# Patient Record
Sex: FEMALE | Race: WHITE | Employment: OTHER | URBAN - METROPOLITAN AREA
[De-identification: names, ages, dates, MRNs, and addresses within clinical notes are randomized per-mention and may not be internally consistent; named-entity substitution may affect disease eponyms.]

---

## 2017-02-06 ENCOUNTER — OFFICE VISIT (OUTPATIENT)
Dept: SURGERY | Age: 62
End: 2017-02-06

## 2017-02-06 DIAGNOSIS — E66.01 OBESITY, MORBID, BMI 40.0-49.9 (HCC): Primary | ICD-10-CM

## 2017-02-07 VITALS — WEIGHT: 220 LBS | BODY MASS INDEX: 41.54 KG/M2 | HEIGHT: 61 IN

## 2017-02-07 NOTE — PROGRESS NOTES
Ravindra Mchugh participated in an educational session on the importance of starting to make healthy choices prior to weight loss surgery. General healthy foods were reviewed. Diet history was reviewed. Patient set a dietary, exercise, and behavioral goal in order to start making healthy changes now.      Visit Vitals    Ht 5' 1\" (1.549 m)    Wt 99.8 kg (220 lb)    BMI 41.57 kg/m2     Frank Fuentes RD

## 2017-03-15 ENCOUNTER — CLINICAL SUPPORT (OUTPATIENT)
Dept: SURGERY | Age: 62
End: 2017-03-15

## 2017-03-15 ENCOUNTER — OFFICE VISIT (OUTPATIENT)
Dept: SURGERY | Age: 62
End: 2017-03-15

## 2017-03-15 VITALS
WEIGHT: 207 LBS | DIASTOLIC BLOOD PRESSURE: 85 MMHG | HEART RATE: 108 BPM | BODY MASS INDEX: 39.08 KG/M2 | OXYGEN SATURATION: 99 % | SYSTOLIC BLOOD PRESSURE: 145 MMHG | HEIGHT: 61 IN

## 2017-03-15 VITALS — WEIGHT: 207 LBS | HEIGHT: 61 IN | BODY MASS INDEX: 39.08 KG/M2

## 2017-03-15 DIAGNOSIS — G47.30 SLEEP APNEA, UNSPECIFIED TYPE: ICD-10-CM

## 2017-03-15 DIAGNOSIS — Z79.01 ANTICOAGULATED: ICD-10-CM

## 2017-03-15 DIAGNOSIS — L30.9 ECZEMA, UNSPECIFIED TYPE: ICD-10-CM

## 2017-03-15 DIAGNOSIS — J45.20 MILD INTERMITTENT ASTHMA WITHOUT COMPLICATION: ICD-10-CM

## 2017-03-15 DIAGNOSIS — I10 ESSENTIAL HYPERTENSION: ICD-10-CM

## 2017-03-15 DIAGNOSIS — E78.00 HYPERCHOLESTEROLEMIA: ICD-10-CM

## 2017-03-15 DIAGNOSIS — G45.9 TRANSIENT CEREBRAL ISCHEMIA, UNSPECIFIED TYPE: ICD-10-CM

## 2017-03-15 DIAGNOSIS — E66.01 SEVERE OBESITY (BMI 35.0-39.9) WITH COMORBIDITY (HCC): Primary | ICD-10-CM

## 2017-03-15 DIAGNOSIS — E27.40 INSUFFICIENCY, ADRENAL (HCC): ICD-10-CM

## 2017-03-15 DIAGNOSIS — I73.9 PVD (PERIPHERAL VASCULAR DISEASE) (HCC): ICD-10-CM

## 2017-03-15 DIAGNOSIS — G89.29 CHRONIC BACK PAIN, UNSPECIFIED BACK LOCATION, UNSPECIFIED BACK PAIN LATERALITY: ICD-10-CM

## 2017-03-15 DIAGNOSIS — K21.9 GASTROESOPHAGEAL REFLUX DISEASE WITHOUT ESOPHAGITIS: ICD-10-CM

## 2017-03-15 DIAGNOSIS — E66.01 OBESITY, MORBID, BMI 40.0-49.9 (HCC): Primary | ICD-10-CM

## 2017-03-15 DIAGNOSIS — M54.9 CHRONIC BACK PAIN, UNSPECIFIED BACK LOCATION, UNSPECIFIED BACK PAIN LATERALITY: ICD-10-CM

## 2017-03-15 DIAGNOSIS — E66.01 MORBID OBESITY DUE TO EXCESS CALORIES (HCC): ICD-10-CM

## 2017-03-15 DIAGNOSIS — R73.03 PRE-DIABETES: ICD-10-CM

## 2017-03-15 DIAGNOSIS — I49.9 CARDIAC ARRHYTHMIA, UNSPECIFIED CARDIAC ARRHYTHMIA TYPE: ICD-10-CM

## 2017-03-15 RX ORDER — CHOLECALCIFEROL TAB 125 MCG (5000 UNIT) 125 MCG
TAB ORAL DAILY
COMMUNITY

## 2017-03-15 RX ORDER — ERGOCALCIFEROL 1.25 MG/1
50000 CAPSULE ORAL
COMMUNITY

## 2017-03-15 NOTE — PATIENT INSTRUCTIONS
Body Mass Index: Care Instructions  Your Care Instructions    Body mass index (BMI) can help you see if your weight is raising your risk for health problems. It uses a formula to compare how much you weigh with how tall you are. A BMI between 18.5 and 24.9 is considered healthy. A BMI between 25 and 29.9 is considered overweight. A BMI of 30 or higher is considered obese. If your BMI is in the normal range, it means that you have a lower risk for weight-related health problems. If your BMI is in the overweight or obese range, you may be at increased risk for weight-related health problems, such as high blood pressure, heart disease, stroke, arthritis or joint pain, and diabetes. BMI is just one measure of your risk for weight-related health problems. You may be at higher risk for health problems if you are not active, you eat an unhealthy diet, or you drink too much alcohol or use tobacco products. Follow-up care is a key part of your treatment and safety. Be sure to make and go to all appointments, and call your doctor if you are having problems. It's also a good idea to know your test results and keep a list of the medicines you take. How can you care for yourself at home? · Practice healthy eating habits. This includes eating plenty of fruits, vegetables, whole grains, lean protein, and low-fat dairy. · Get at least 30 minutes of exercise 5 days a week or more. Brisk walking is a good choice. You also may want to do other activities, such as running, swimming, cycling, or playing tennis or team sports. · Do not smoke. Smoking can increase your risk for health problems. If you need help quitting, talk to your doctor about stop-smoking programs and medicines. These can increase your chances of quitting for good. · Limit alcohol to 2 drinks a day for men and 1 drink a day for women. Too much alcohol can cause health problems.   If you have a BMI higher than 25  · Your doctor may do other tests to check your risk for weight-related health problems. This may include measuring the distance around your waist. A waist measurement of more than 40 inches in men or 35 inches in women can increase the risk of weight-related health problems. · Talk with your doctor about steps you can take to stay healthy or improve your health. You may need to make lifestyle changes to lose weight and stay healthy, such as changing your diet and getting regular exercise. Where can you learn more? Go to http://silvana-rasta.info/. Enter S176 in the search box to learn more about \"Body Mass Index: Care Instructions. \"  Current as of: February 16, 2016  Content Version: 11.1  © 4112-4165 [employer]. Care instructions adapted under license by [employer] (which disclaims liability or warranty for this information). If you have questions about a medical condition or this instruction, always ask your healthcare professional. [place] [address] any warranty or liability for your use of this information. Patient Instructions      1. Continue to monitor carbohydrate and protein intake- remember to keep your           total  carbohydrates to 50 grams or less per day for best results. 2. Remember hydration goals - usually 48 to 64 ounces of liquids per day  3. Continue to work towards exercise goals - minimum 3 days per week of 45          minutes to  1 hour at a time. 4. Remember to take vitamins as directed        Supplement Resource Guide    Importance of Protein:   Maintains lean body mass, produces antibodies to fight off infections, heals wounds, minimizes hair loss, helps to give you energy, helps with satiety, and keeping you full between meals.     Importance of Calcium:  Needed for healthy bones and teeth, normal blood clotting, and nervous system functioning, higher risk of osteoporosis and bone disease with non-compliance. Importance of Multivitamins: Many functions. Supply you with extra nutrients that you may be missing from food. May lead to iron deficiency anemia, weakness, fatigue, and many other symptoms with non-compliance. Importance of B Vitamins:  Important for red blood cell formation, metabolism, energy, and helps to maintain a healthy nervous system. Protein Supplement  Find one you like now. Use immediately after surgery. Look for:  35-50g protein each day from your protein supplement once you reach the progression diet. 0-3 g fat per serving  0-3 g sugar per serving    Protein drinks should be split in separate dosages. Recommend: Lifelong  1 year + Calcium Supplement:     Start taking within a month after surgery. Look for: Calcium Citrate Plus D (1500 mg per day)  Recommend: Citracal     .          Avoid chocolate chewable calcium. Can use chewable bariatric or [employer] brand or similar chewable. The body cannot absorb more than 500-600 mg @ a time. Take for Life Multi-vitamin Supplement:      1st Month After Surgery: Any complete chewable, such as: [place]s Complete chewables. Avoid [place] sours or gummies. They lack iron and other important nutrients and also have added sugar. Continue with chewable vitamin or change to adult complete multivitamin one month after surgery. Menstruating women can take a prenatal vitamin. Make sure has at least 18 mg iron and 328-926 mcg folic acid):    Vitamin B12, B Complex Vitamin, and Biotin  Start taking within a month after surgery. Vitamin B12:  1000 mcg of Vitamin B12 three times weekly    Must take sublingually (meaning you take it under your tongue) or in a liquid drop form for easy absorption. B Complex Vitamin: Take a pill or liquid drop form once daily. Biotin: This vitamin can help prevent hair loss.     Recommend 5mg   (5000 mcg) a day  Biotin is Optional

## 2017-03-15 NOTE — MR AVS SNAPSHOT
Visit Information Date & Time Provider Department Dept. Phone Encounter #  
 3/15/2017  9:00 AM [facility] Zunilda Hutchins [phone] [id number] Upcoming Health Maintenance Date Due Hepatitis C Screening 1955 DTaP/Tdap/Td series (1 - Tdap) 11/24/1975 PAP AKA CERVICAL CYTOLOGY 11/24/1975 BREAST CANCER SCRN MAMMOGRAM 11/24/2004 FOBT Q 1 YEAR AGE 50-75 11/24/2004 ZOSTER VACCINE AGE 60> 11/24/2014 INFLUENZA AGE 9 TO ADULT 8/1/2016 Allergies as of 3/15/2017  Review Complete On: 3/15/2017 By: Sincere Johnson MD  
  
 Severity Noted Reaction Type Reactions Contrast Agent [Iodine]  08/07/2012    Swelling, Cough Other Medication  08/07/2012    Other (comments) PAPER TAPE-BLISTERS Current Immunizations  Never Reviewed No immunizations on file. Not reviewed this visit Vitals Height(growth percentile) Weight(growth percentile) BMI OB Status Smoking Status 5' 1\" (1.549 m) 207 lb (93.9 kg) 39.11 kg/m2 Hysterectomy Never Smoker BMI and BSA Data Body Mass Index Body Surface Area  
 39.11 kg/m 2 2.01 m 2 Your Updated Medication List  
  
   
This list is accurate as of: 3/15/17  9:40 AM.  Always use your most recent med list.  
  
  
  
  
 AMBIEN 10 mg tablet Generic drug:  zolpidem Take 10 mg by mouth nightly as needed. DIOVAN 40 mg tablet Generic drug:  valsartan Take 40 mg by mouth daily. HYDROcodone-acetaminophen 5-325 mg per tablet Commonly known as:  Kiran Halley Take 1 Tab by mouth every four (4) hours as needed. * LYRICA 200 mg capsule Generic drug:  pregabalin Take 200 mg by mouth daily. * LYRICA 100 mg capsule Generic drug:  pregabalin Take 100 mg by mouth nightly. metoprolol tartrate 100 mg IR tablet Commonly known as:  LOPRESSOR Take 100 mg by mouth two (2) times a day. predniSONE 10 mg tablet Commonly known as:  Tyson Gelineau Take 5 mg by mouth two (2) times a day. PROTONIX 40 mg tablet Generic drug:  pantoprazole Take 40 mg by mouth daily. VALTREX 500 mg tablet Generic drug:  valACYclovir Take 500 mg by mouth daily. * Notice: This list has 2 medication(s) that are the same as other medications prescribed for you. Read the directions carefully, and ask your doctor or other care provider to review them with you. Patient Instructions Goals:  
1. Exercise on treadmill or chair or elliptical exercises, 3-4 per week for at least 15 min. Total among activities. 2. Continue new diet plan with two shakes as meals, up to two optional snacks, make sure to have non-starchy vegetables at dinner with protein source too. 3. Start taking multivitamin again- once daily. Introducing \[facility]\"" & HEALTH SERVICES! [employer] introduces [employer] patient portal. Now you can access parts of your medical record, email your doctor's office, and request medication refills online. 1. In your internet browser, go to [email or web address] 2. Click on the First Time User? Click Here link in the Sign In box. You will see the New Member Sign Up page. 3. Enter your [employer] Access Code exactly as it appears below. You will not need to use this code after youve completed the sign-up process. If you do not sign up before the expiration date, you must request a new code. · [employer] Access Code: [email or web address] Expires: 6/13/2017  8:57 AM 
 
4. Enter the last four digits of your Social Security Number (xxxx) and Date of Birth (mm/dd/yyyy) as indicated and click Submit. You will be taken to the next sign-up page. 5. Create a [employer] ID. This will be your [employer] login ID and cannot be changed, so think of one that is secure and easy to remember. 6. Create a [employer] password. You can change your password at any time. 7. Enter your Password Reset Question and Answer. This can be used at a later time if you forget your password. 8. Enter your e-mail address. You will receive e-mail notification when new information is available in [address] 9. Click Sign Up. You can now view and download portions of your medical record. 10. Click the Download Summary menu link to download a portable copy of your medical information. If you have questions, please visit the Frequently Asked Questions section of the [employer] website. Remember, [employer] is NOT to be used for urgent needs. For medical emergencies, dial 911. Now available from your iPhone and Android! Please provide this summary of care documentation to your next provider. Your primary care clinician is listed as [facility]. If you have any questions after today's visit, please call [phone].

## 2017-03-15 NOTE — MR AVS SNAPSHOT
Visit Information Date & Time Provider Department Dept. Phone Encounter #  
 3/15/2017  8:30 AM [facility] [place] [phone] [id number] Your Appointments 4/10/2017  6:00 PM  
EDUCATION CLASS with TSS NUTRI VISIT PEN [facility] ([facility]) Appt Note: f/u 3 of 4, [employer]- exercise log  
 [id number] [address]  
  
   
 [address] [place] [address] 5/17/2017 11:00 AM  
NUTRITION COUNSELING with TSS NUTRI VISIT PEN [facility] ([facility]) Appt Note: brown chart f/u, 4 of 4  
 [id number] [address]  
  
   
 [address] [place] [address] 5/17/2017 11:30 AM  
New Patient with MD [facility] ([facility]) Appt Note: brown chart f/u, 4 of 4  
 [id number] [address]  
  
   
 [address] [place] [address] Upcoming Health Maintenance Date Due Hepatitis C Screening 1955 DTaP/Tdap/Td series (1 - Tdap) 11/24/1975 PAP AKA CERVICAL CYTOLOGY 11/24/1975 BREAST CANCER SCRN MAMMOGRAM 11/24/2004 FOBT Q 1 YEAR AGE 50-75 11/24/2004 ZOSTER VACCINE AGE 60> 11/24/2014 INFLUENZA AGE 9 TO ADULT 8/1/2016 Allergies as of 3/15/2017  Review Complete On: 3/15/2017 By: David Davis MD  
  
 Severity Noted Reaction Type Reactions Contrast Agent [Iodine]  08/07/2012    Swelling, Cough Other Medication  08/07/2012    Other (comments) PAPER TAPE-BLISTERS Current Immunizations  Never Reviewed No immunizations on file. Not reviewed this visit You Were Diagnosed With   
  
 Codes Comments Severe obesity (BMI 35.0-39. 9) with comorbidity ([facility]    -  Primary ICD-10-CM: E66.01, C19.55 ICD-9-CM: 278.01, V85.35 Gastroesophageal reflux disease without esophagitis     ICD-10-CM: K21.9 ICD-9-CM: 530.81 Morbid obesity due to excess calories (HCC)     ICD-10-CM: E66.01 
ICD-9-CM: 278.01 Essential hypertension     ICD-10-CM: I10 
ICD-9-CM: 401.9 PVD (peripheral vascular disease) ([facility].)     ICD-10-CM: I73.9 ICD-9-CM: 443.9 Transient cerebral ischemia, unspecified type     ICD-10-CM: G45.9 ICD-9-CM: 435.9 Cardiac arrhythmia, unspecified cardiac arrhythmia type     ICD-10-CM: I49.9 ICD-9-CM: 427.9 Insufficiency, adrenal ([facility]     ICD-10-CM: E27.2 ICD-9-CM: 255.41 Eczema, unspecified type     ICD-10-CM: L30.9 ICD-9-CM: 692.9 Mild intermittent asthma without complication     FLR--TI: J45.20 ICD-9-CM: 493.90 Chronic back pain, unspecified back location, unspecified back pain laterality     ICD-10-CM: M54.9, G89.29 ICD-9-CM: 724.5, 338.29 Hypercholesterolemia     ICD-10-CM: E78.00 ICD-9-CM: 272.0 Sleep apnea, unspecified type     ICD-10-CM: G47.30 ICD-9-CM: 780.57 Pre-diabetes     ICD-10-CM: R73.03 
ICD-9-CM: 790.29 Anticoagulated     ICD-10-CM: Z79.01 
ICD-9-CM: V58.61 Vitals BP Pulse Height(growth percentile) Weight(growth percentile) SpO2 BMI  
 145/85 (BP 1 Location: Left arm, BP Patient Position: Sitting) (!) 108 5' 1\" (1.549 m) 207 lb (93.9 kg) 99% 39.11 kg/m2 OB Status Smoking Status Hysterectomy Never Smoker BMI and BSA Data Body Mass Index Body Surface Area  
 39.11 kg/m 2 2.01 m 2 Your Updated Medication List  
  
   
This list is accurate as of: 3/15/17  9:56 AM.  Always use your most recent med list.  
  
  
  
  
 AMBIEN 10 mg tablet Generic drug:  zolpidem Take 10 mg by mouth nightly as needed. DIOVAN 40 mg tablet Generic drug:  valsartan Take 40 mg by mouth daily. HYDROcodone-acetaminophen 5-325 mg per tablet Commonly known as:  Juanetta Rasp Take 1 Tab by mouth every four (4) hours as needed. * LYRICA 200 mg capsule Generic drug:  pregabalin Take 200 mg by mouth daily. * LYRICA 100 mg capsule Generic drug:  pregabalin Take 100 mg by mouth nightly. metoprolol tartrate 100 mg IR tablet Commonly known as:  LOPRESSOR Take 100 mg by mouth two (2) times a day. predniSONE 10 mg tablet Commonly known as:  Brandee Hawks Take 5 mg by mouth two (2) times a day. PROTONIX 40 mg tablet Generic drug:  pantoprazole Take 40 mg by mouth daily. VALTREX 500 mg tablet Generic drug:  valACYclovir Take 500 mg by mouth daily. * Notice: This list has 2 medication(s) that are the same as other medications prescribed for you. Read the directions carefully, and ask your doctor or other care provider to review them with you. To-Do List   
 03/15/2017 Lab:  CBC WITH AUTOMATED DIFF   
  
 03/15/2017 Lab:  H. PYLORI ABS, IGG, IGA, IGM   
  
 03/15/2017 Lab:  TSH 3RD GENERATION Patient Instructions Body Mass Index: Care Instructions Your Care Instructions Body mass index (BMI) can help you see if your weight is raising your risk for health problems. It uses a formula to compare how much you weigh with how tall you are. A BMI between 18.5 and 24.9 is considered healthy. A BMI between 25 and 29.9 is considered overweight. A BMI of 30 or higher is considered obese. If your BMI is in the normal range, it means that you have a lower risk for weight-related health problems. If your BMI is in the overweight or obese range, you may be at increased risk for weight-related health problems, such as high blood pressure, heart disease, stroke, arthritis or joint pain, and diabetes. BMI is just one measure of your risk for weight-related health problems. You may be at higher risk for health problems if you are not active, you eat an unhealthy diet, or you drink too much alcohol or use tobacco products. Follow-up care is a key part of your treatment and safety. Be sure to make and go to all appointments, and call your doctor if you are having problems. It's also a good idea to know your test results and keep a list of the medicines you take. How can you care for yourself at home? · Practice healthy eating habits. This includes eating plenty of fruits, vegetables, whole grains, lean protein, and low-fat dairy. · Get at least 30 minutes of exercise 5 days a week or more. Brisk walking is a good choice. You also may want to do other activities, such as running, swimming, cycling, or playing tennis or team sports. · Do not smoke. Smoking can increase your risk for health problems. If you need help quitting, talk to your doctor about stop-smoking programs and medicines. These can increase your chances of quitting for good. · Limit alcohol to 2 drinks a day for men and 1 drink a day for women. Too much alcohol can cause health problems. If you have a BMI higher than 25 · Your doctor may do other tests to check your risk for weight-related health problems. This may include measuring the distance around your waist. A waist measurement of more than 40 inches in men or 35 inches in women can increase the risk of weight-related health problems. · Talk with your doctor about steps you can take to stay healthy or improve your health. You may need to make lifestyle changes to lose weight and stay healthy, such as changing your diet and getting regular exercise. Where can you learn more? Go to http://silvana-rasta.info/. Enter S176 in the search box to learn more about \"Body Mass Index: Care Instructions. \" Current as of: February 16, 2016 Content Version: 11.1 © 3014-7392 [employer].  Care instructions adapted under license by [address] (which disclaims liability or warranty for this information). If you have questions about a medical condition or this instruction, always ask your healthcare professional. [address] any warranty or liability for your use of this information. Introducing [facility] & HEALTH SERVICES! Romel Aaron introduces [employer] patient portal. Now you can access parts of your medical record, email your doctor's office, and request medication refills online. 1. In your internet browser, go to [email or web address] 2. Click on the First Time User? Click Here link in the Sign In box. You will see the New Member Sign Up page. 3. Enter your [employer] Access Code exactly as it appears below. You will not need to use this code after youve completed the sign-up process. If you do not sign up before the expiration date, you must request a new code. · [employer] Access Code: [email or web address] Expires: 6/13/2017  8:57 AM 
 
4. Enter the last four digits of your Social Security Number (xxxx) and Date of Birth (mm/dd/yyyy) as indicated and click Submit. You will be taken to the next sign-up page. 5. Create a [employer] ID. This will be your [employer] login ID and cannot be changed, so think of one that is secure and easy to remember. 6. Create a [employer] password. You can change your password at any time. 7. Enter your Password Reset Question and Answer. This can be used at a later time if you forget your password. 8. Enter your e-mail address. You will receive e-mail notification when new information is available in [address] 9. Click Sign Up. You can now view and download portions of your medical record. 10. Click the Download Summary menu link to download a portable copy of your medical information. If you have questions, please visit the Frequently Asked Questions section of the [employer] website.  Remember, [employer] is NOT to be used for urgent needs. For medical emergencies, dial 911. Now available from your iPhone and Android! Please provide this summary of care documentation to your next provider. Your primary care clinician is listed as Melissa Carrera. If you have any questions after today's visit, please call [phone].

## 2017-03-15 NOTE — PROGRESS NOTES
Medical Weight Loss Multi-Disciplinary Program    Name: Amy Belle   : 1955    Session# 2  Date: 3/15/2017     Height: 5' 1\" (154.9 cm)    Weight: 93.9 kg (207 lb) lbs. Body mass index is 39.11 kg/(m^2). Pounds Lost: 13    Dietary Instructions    Reviewed intake  Understanding low carbohydrates, low sugar, higher protein meals  Understanding proper portions  Instruction given for personal dietary changes  Discussed perceived compliance  Comments: Pt given brief pre/post-op diet ed and diet hx reviewed. Physical Activity/Exercise    Discussed Perceived Compliance  Reasonable Goals Set  Motivation  Comments: Pt walks on her treadmill for about 15 minutes, 3-4 times per week on her good days when she does not have very much back pain. Goal to be consistent with exercise (either treadmill, elliptical, or chair exercises when has back pain), doing them for a total of at least 15 minutes, 3-4 times per week. Behavior Modification    Positive attitude  Comments: Pt is working on the following goals:    1. Exercise on treadmill or chair or elliptical exercises, 3-4 per week for at least 15 min. Total among activities. 2. Continue new diet plan with two shakes as meals, up to two optional snacks, make sure to have non-starchy vegetables at dinner with protein source too. 3. Start taking multivitamin again- once daily. Candidate for surgery (per RD): pending     Dietitian: Rita Warner RD     Amy Belle is a 57 y.o. female who present for a pre-op evaluation.     Visit Vitals    Ht 5' 1\" (1.549 m)    Wt 93.9 kg (207 lb)    BMI 39.11 kg/m2     Past Medical History:   Diagnosis Date    Anticoagulated     due to PVD    Arrhythmia     sees Dr. Jaye Drummond - uses Beta Blockers for rate control    Asthma     Chronic back pain     Eczema     GERD (gastroesophageal reflux disease)     Hypercholesterolemia     Hypertension     Insufficiency, adrenal ([address]     pt states due to multiple steroid injection for back pain and eczema    Pre-diabetes     last HgbA1C (early 2017) was under 7.0    PVD (peripheral vascular disease) (HCC)     hx of carotid disease    Severe obesity (BMI 35.0-39. 9) with comorbidity ([address]     Severe obesity ([address]     Sleep apnea     does not use a device    TIA (transient ischemic attack)     X 2 related to carotid disease       Patient Education and Materials Provided:  Supplement [facility], B Vitamin Information, MVI Recommendations, Calcium Citrate Information, Bariatric Supplement Companies, Protein Supplement Information, Fluid Requirements, No Caffeine or Carbonation, No Alcohol for One Year Post Op, 3 Balanced Meals a Day, Food Group Guide, Exercising and Addressed Current Habits / Changes to make     Reasons for Surgery:  BMI > 35 with one or more medically significant comorbidities    Summary:  Pt given brief pre/post-op diet ed and diet hx reviewed. Pt set several goals. See below. Pt has already made significant changes to include decreased carbohydrate (using Premier for two meals per day), decreased caffeine, decreased diet soda, and decreased calories overall. Candidate for surgery:   Pending    Procedure:  laparoscopic sleeve gastrectomy    Nutritional Hx: What is the number of meals you eat per day? 3  Comment: dinner, protein drink- Premier for two meals- just started this after the seminar     Do you eat between meals / snack? yes  Typical snack: fruit cocktail, applesauce, or cucumber- typically once per day     Eating speed: slow- been working on slowing down    How many sodas do you drink per day? 1/2 of a [employer] daily, down from 3     How many caffeinated drinks do you have per day? Cut coffee out    How much water do you drink per day? 4 (8oz glasses)    How often do you eat fast food? never, sit down restaurant once per week    How often do you consume alcohol? never      Diet History:  Breakfast  What are you eating and how much? Premier shake   When? 6:30 am    Where? iii   Snacks  What are you eating and how much? i   When? ii   Where? iii   Hydration  What are you eating and how much? Water, decaf coffee with a tsp of sugar    When? ii   Where? ii   Lunch  What are you eating and how much? Premier shake    When? 12:30    Where? iii   Snacks  What are you eating and how much? Small fruit cocktail in natural juice    When? 3 pm    Where? iii   Hydration  What are you eating and how much? Cup of decaf coffee with 1 tsp sugar   When? ii   Where? iii   Dinner  What are you eating and how much? Sloppy joes with lean hamburger on a bun   When? 4 pm    Where? iii   Snacks  What are you eating and how much? 3 rd cup of decaf coffee with 1 tsp of sugar    When? 6 pm   Where? iii   Hydration  What are you eating and how much? water   When? 7 pm and after- have no calories    Where? iii     Exercise:  Do you currently have an exercise routine? yes  What kind of exercise do you do? uses her treadmill  . For how long? 15 min For how often? 3-4 times per week    Goals:   1. Exercise on treadmill or chair or elliptical exercises, 3-4 per week for at least 15 min. Total among activities. 2. Continue new diet plan with two shakes as meals, up to two optional snacks, make sure to have non-starchy vegetables at dinner with protein source too. 3. Start taking multivitamin again- once daily.

## 2017-03-16 NOTE — PROGRESS NOTES
Bariatric Surgery Consultation    Subjective: The patient is a 57 y.o. obese female with a Body mass index is 39.11 kg/(m^2). Ethbrooklyne Silverio The patient is currently her heaviest weight for the past 2 years. she has been overweight since early adulthood. she has been considering surgery since last year. she desires surgery at this time because of   multiple health concerns and their lifestyle issues which are hindered by their weight. she has been referred by his family physician Dr Jade Pritchett for   evaluation and treatment of their obesity via surgical intervention. Tin Powell has tried multiple diets in her lifetime most recently tried behavior modification and unsupervised diets    Bariatric comorbidities present are   Patient Active Problem List   Diagnosis Code    Severe obesity ([facility] E66.01    Severe obesity (BMI 35.0-39. 9) with comorbidity ([facility] E66.01, Z68.35    Hypertension I10    Asthma J45.909    Chronic back pain M54.9, G89.29    Hypercholesterolemia E78.00    Sleep apnea G47.30    PVD (peripheral vascular disease) (HCC) I73.9    Insufficiency, adrenal (HCC) E27.2    Eczema L30.9    TIA (transient ischemic attack) G45.9    GERD (gastroesophageal reflux disease) K21.9    Pre-diabetes R73.03    Anticoagulated Z79.01    Arrhythmia I49.9       The patient is considering laparoscopic sleeve gastrectomy for surgical weight loss due to their ineffective progress with medical forms of weight loss and the urging of their   physician who cares for their primary medical issues. The patient  now presents  for consideration for weight loss surgery understanding the benefits of this over a medical   approach of weight loss as was discussed in our presentation on weight loss surgery. They have discussed their plans both with their family and primary care physician who   is in support of their pursuit of such.  The patient has had no health issues as of late and denies and gastrointestinal disturbances other than what is outlined below in their   review of symptoms. All of their prior evaluations available by both their PCP's and specialists physicians have been reviewed today either in the Care Everywhere portal or   scanned under the media tab. I have spent a large portion of my initial consultation today reviewing the patients current dietary habits which have contributed to their health issues and obesity. I have suggested to them personally a dietary regimen that they can initiate now to help with their status as it pertains to their weight. They understand that the most important   aspect of their journey through their weight loss endeavor will be their adherence to a new lifestyle of healthy eating behavior. They also understand that an adherence to an   exercise program will not only help with weight loss but is ultimately important in weight maintenance. The patients goal weight is 130lb. These goals are consistent with expected outcomes of their desired operation. her Medical goals are resolution of these health issues. Patient Active Problem List    Diagnosis Date Noted    Severe obesity ([address]     Severe obesity (BMI 35.0-39. 9) with comorbidity ([facility]     Hypertension     Asthma     Chronic back pain     Hypercholesterolemia     Sleep apnea     PVD (peripheral vascular disease) (HCC)     Insufficiency, adrenal (HCC)     Eczema     TIA (transient ischemic attack)     GERD (gastroesophageal reflux disease)     Pre-diabetes     Anticoagulated     Arrhythmia      Past Surgical History:   Procedure Laterality Date    FUSION OF SACROILIAC JOINT      HX BLADDER SUSPENSION      HX CAROTID ENDARTERECTOMY  07/2012    left    HX CERVICAL FUSION      HX HEMORRHOIDECTOMY      HX LAP CHOLECYSTECTOMY      HX LUMBAR FUSION      X 2    HX ORTHOPAEDIC      hammer toe    HX ORTHOPAEDIC      tendon repair foot    HX TOTAL ABDOMINAL HYSTERECTOMY        Social History   Substance Use Topics    Smoking status: Never Smoker    Smokeless tobacco: Not on file    Alcohol use No      Family History   Problem Relation Age of Onset    Cancer Mother     Cancer Father       Current Outpatient Prescriptions   Medication Sig Dispense Refill    metoprolol (LOPRESSOR) 100 mg tablet Take 100 mg by mouth two (2) times a day.  pregabalin (LYRICA) 200 mg capsule Take 200 mg by mouth daily.  pregabalin (LYRICA) 100 mg capsule Take 100 mg by mouth nightly.  pantoprazole (PROTONIX) 40 mg tablet Take 40 mg by mouth daily.  zolpidem (AMBIEN) 10 mg tablet Take 10 mg by mouth nightly as needed.  predniSONE (DELTASONE) 10 mg tablet Take 5 mg by mouth two (2) times a day.  valACYclovir (VALTREX) 500 mg tablet Take 500 mg by mouth daily.  valsartan (DIOVAN) 40 mg tablet Take 40 mg by mouth daily.  HYDROcodone-acetaminophen (NORCO) 5-325 mg per tablet Take 1 Tab by mouth every four (4) hours as needed.  cholecalciferol, VITAMIN D3, (VITAMIN D3) 5,000 unit tab tablet Take  by mouth daily.  ergocalciferol (VITAMIN D2) 50,000 unit capsule Take 50,000 Units by mouth.  CYANOCOBALAMIN, VITAMIN B-12, (B-12 DOTS PO) Take 1,000 mcg by mouth.        Allergies   Allergen Reactions    Contrast Agent [Iodine] Swelling and Cough    Other Medication Other (comments)     PAPER TAPE-BLISTERS          Review of Systems:     General - No history or complaints of unexpected fever, chills, or weight loss  Head/Neck - No history or complaints of headache, diplopia, dysphagia, hearing loss  Cardiac - No history or complaints of chest pain, palpitations, murmur, or shortness of breath  Pulmonary - No history or complaints of shortness of breath, productive cough, hemoptysis  Gastrointestinal - (+) reflux, no abdominal pain, obstipation/constipation or blood per rectum  Genitourinary - No history or complaints of hematuria/dysuria, stress urinary incontinence symptoms, or renal lithiasis  Musculoskeletal - moderate joint pain in their back and knees,  no muscular weakness  Hematologic - No history or complaints of bleeding disorders,  No blood transfusions  Neurologic - No history or complaints of  migraine headaches, seizure activity, syncopal episodes, TIA or stroke  Integumentary - No history or complaints of rashes, abnormal nevi, skin cancer  Gynecological - post hysterectomy    Objective:     Visit Vitals    /85 (BP 1 Location: Left arm, BP Patient Position: Sitting)    Pulse (!) 108    Ht 5' 1\" (1.549 m)    Wt 93.9 kg (207 lb)    SpO2 99%    BMI 39.11 kg/m2       Physical Examination: General appearance - oriented to person, place, and time and chronically ill appearing  Mental status - alert, oriented to person, place, and time, normal mood, behavior, speech, dress, motor activity, and thought processes  Eyes - pupils equal and reactive, extraocular eye movements intact, sclera anicteric, left eye normal, right eye normal  Ears - right ear normal, left ear normal  Nose - normal and patent, no erythema, discharge or polyps  Mouth - mucous membranes moist, pharynx normal without lesions  Neck - supple, no significant adenopathy, left sided cervical incision noted  Lymphatics - no palpable lymphadenopathy, no hepatosplenomegaly  Chest - clear to auscultation, no wheezes, rales or rhonchi, symmetric air entry  Heart - normal rate, regular rhythm, normal S1, S2, no murmurs, rubs, clicks or gallops  Abdomen - soft, nontender, nondistended, no masses or organomegaly  Back exam - full range of motion, no tenderness, palpable spasm or pain on motion  Neurological - alert, oriented, normal speech, no focal findings or movement disorder noted  Musculoskeletal - no joint tenderness, deformity or swelling  Extremities - peripheral pulses normal, no pedal edema, no clubbing or cyanosis  Skin - normal coloration and turgor, no rashes, no suspicious skin lesions noted    Labs: No results found for this or any previous visit (from the past 1440 hour(s)). Assessment:     Morbid obesity with comorbidity    Plan:     laparoscopic sleeve gastrectomy    This is a 57 y.o. female with a BMI of Body mass index is 39.11 kg/(m^2). and the weight-related co-morbidties as noted below. Raissa Mcdaniel meets the NIH criteria for bariatric surgery   based upon the BMI of Body mass index is 39.11 kg/(m^2). and multiple weight-related co-morbidties. Raissa Mcdaniel has elected laparoscopic sleeve gastrectomy as her intervention of   choice for treatment of morbid obestiy through surgical means secondary to its safety profile, rapid return to work  and decreases in operative risks over gastric bypass. In the office today, following Cece's history and physical examination, a 30 minute discussion regarding the anatomic alterations for the laparoscopic sleeve gastrectomy was   undertaken. The dietary expectations and the patient and physician dependent factors for success were thoroughly discussed, to include the need for interval follow-up and long  -term dietary changes associated with success. The possible complications of the sleeve gastrectomy  were also discussed, to include;death, DVT/PE, staple line leak, bleeding,   stricture formation, infection, nutritional deficiencies and sleeve dilation. Specific weight related outcomes for success were also discussed with an emphasis on careful and close   follow-up with the first year and eating behavior modification as the baseline and cyclical hunger return. The patient expressed an understanding of the above factors, and her questions were answered in their entirety. In addition, the patient attended a 1.5 hour power point seminar regarding obesity, surgical weight loss including, adjustable gastric band, gastric bypass, and sleeve gastrectomy.     This discussion contrasted the different surgical techniques, mechanisms of actions and expected outcomes, and surgical and medical risks associated with each procedure. During this seminar, there was a long question and answer session where each questions was answered until there were no additional questions. Today, the patient had all of her questions answered and desires to proceed with  bariatric surgery initially choosing sleeve gastrectomy as her surgical option. The patient's adrenal issues and her need for steroids could be problematic. I have discussed with her the effects of steroids post-op and how they affect healing. She understands the need to be completely off any form of steroid for a 6 week perioperative period. She will discuss this with her endocrinologist to see if this is feasible. She understands that there is a good likelihood that we may not be able to proceed with bariatric surgery. If she does proceed with surgery she will need a complete cardiac work-up. Secondary Diagnoses:     Dietary Intervention  - The patient is currently scheduled to see or has been followed by a bariatric nutritionist for an attempt at preoperative weight loss as has been dictated by their insurance carrier. They will be assessed at various times during their follow up to evaluate their progress depending on the length of time that is required once again by their carrier. I have explained the importance of   preoperative weight loss and the benefits regarding lower surgical risk and also assisting the patient in reaching their weight loss goal.  Finally they understand their is a physiologic benefit from the   standpoint of hepatic volume reduction preoperatively. I have reiterated the importance of a low carbohydrate and high protein regimen to achieve their stated goal.    Obstructive Sleep Apnea -The patient understands the association of sleep apnea and obesity and the additional risk that it caries related to post surgical complications. We will have the patient   bring their CPAP machine to the hospital for use both postoperatively in the PACU and on the floor at its appropriate setting.  We will have them continue using it while at home after surgery and   follow up with their pulmonologist 6 months after to be retested to see if it can be discontinued at that time period. Adult Onset Diabetes - The patient has jenny given a very low carbohydrate diet preoperatively along with instructions to monitor their blood sugars on a regular daily basis. When  their surgery is   performed  we will be monitoring the patient with sliding scale insulin and accuchecks.  Based on those values we will determine whether the patient needs a reduction of those medications   postoperatively or total removal of those medications on discharge.  We will have the patient continue accuchecks postoperatively while at home also and report to me or their family physician   for appropriate adjustments as needed.  The patient also understands that in the event of uncontrolled blood sugar preoperatively that we may choose to postpone their surgery. Hypertension - The patient has a clear understanding of how weight loss improves hypertension as a whole, but also they understand that there is a significant genetic component to this disease   process. We will monitor the patients blood pressure while in the hospital and the plan would be to continue those medications postoperatively.  If a diuretic is being used we will stop them on discharge   to prevent dehydration particularly with the sleeve gastrectomy and the gastric bypass procedures.  They will be instructed to monitor their blood pressure postoperatively while at home and notify their   primary care physician in the event of any significantly high or uncharacteristic readings. Hyperlipidemia - The patient understands that studies show that almost all patient will realize an improvement in their lipid profile with weight loss that occurs with these procedures.  They however also   understand that hyperlipidemia is a multifactorial disease particularly as it pertains to their genetic background and that there is no guarantee toward cure  of this issue. We will resume their medications   immediately postoperatively as this tends to decrease any post operative cardiac events.  The patient will follow up with their family physician in the postoperative period with plans to repeat their lipid panel   2-3 month postoperative for potential adjustment or removal of these medications. GERD -The patient understands that weight loss surgery is not a guaranteed cure for reflux disease but does understand the benefits that weight loss can have on reflux disease.  They also understand that   at the time of surgery the gastroesophageal junction will be evaluated for the presence of a diaphragmatic hernia.  Hernias will be corrected always with the gastric band and sleeve gastrectomy procedures,   but only on a case by case basis with the gastric bypass if it prevents our ability to perform the operation at hand, or if I feel that they would benefit long term with correction of this issue.  The patient also   understands that neither weight loss surgery nor repair of a diaphragmatic hernia repair guarantees the complete cessation of the disease. They also understand there is a possibility of recurrence with a   simple crural repair as is performed with these procedures. They understand they may have to continue their medications in the postoperative period. They have a good understanding that the gastric bypass   procedure is better suited to total resolution of this issue and that neither the Lap Band nor sleeve gastrectomy is considered a curative procedure as it pertains to this diagnosis.     Weight Related Arthritis -The patient understands the benefits that weight loss surgery can have on their arthritis but also understands that weight loss is not a guaranteed cure and relief of symptoms is   often dependent on the severity of the underlying disease.  The patient also understands that traditional pharmaceutical treatments for this diagnosis are usually unavailable to post-operative weight loss   patients due to the effects on the gastrointestinal tract particularly with the gastric bypass and to a lesser effect with the sleeve gastrectomy.  Any changes to the patients medication treatment will ultimately   be made the patients PCP with input by our office. Hyperlipidemia - The patient understands that studies show that almost all patient will realize an improvement in their lipid profile with weight loss that occurs with these procedures. They however   also understand that hyperlipidemia is a multifactorial disease particularly as it pertains to their genetic background and that there is no guarantee toward cure  of this issue. We will resume their medications   both pre operatively and immediately postoperatively as this tends to decrease any post operative cardiac events.  The patient will follow up with their family physician in the postoperative period with plans to   repeat their lipid panel 2-3 month postoperative for potential adjustment or removal of these medications. Weight Related Arthritis -The patient understands the benefits that weight loss surgery can have on their arthritis but also understands that weight loss is not a guaranteed cure and relief of symptoms is   often dependent on the severity of the underlying disease.  The patient also understands that traditional pharmaceutical treatments for this diagnosis are usually unavailable to post-operative weight loss   patients due to the effects on the gastrointestinal tract particularly with the gastric bypass and to a lesser effect with the sleeve gastrectomy.  Any changes to the patients medication treatment will   ultimately be made the patients PCP with input by our office.     Coronary Artery Disease - The patient has undergone or will undergo a preoperative evaluation by a cardiologist such that they are deemed a reasonable candidate for surgery. The patient understands   that with a history of cardiac disease that there is always an increased risk compared to the average patient. Appropriate recommendations have been followed as recommended by the cardiologist.  The   patients ASA will be resumed approximately 1 month postoperatively in a coated form. If her endocrinologist feels she can remain off of prednisone or other steroids in the chris op period we will have her see a   Cardiologist pre op.         Signed By: Ashli Aggarwal MD     March 16, 2017

## 2017-04-10 ENCOUNTER — OFFICE VISIT (OUTPATIENT)
Dept: SURGERY | Age: 62
End: 2017-04-10

## 2017-04-10 DIAGNOSIS — E66.01 SEVERE OBESITY (BMI 35.0-39.9) WITH COMORBIDITY (HCC): Primary | ICD-10-CM

## 2017-04-26 VITALS — HEIGHT: 61 IN | BODY MASS INDEX: 40.02 KG/M2 | WEIGHT: 212 LBS

## 2017-04-26 NOTE — PROGRESS NOTES
Medical Weight Loss Multi-Disciplinary Program    Name: Niranjan Motta   : 1955    Session# 3  Date: 4/10/2017     Height: 5' 1\" (154.9 cm)    Weight: 96.2 kg (212 lb) lbs. Body mass index is 40.06 kg/(m^2). Pounds Gained: 5    Dietary Instructions    Reviewed intake  Understanding low carbohydrates, low sugar, higher protein meals  Understanding proper portions  Instruction given for personal dietary changes  Discussed perceived compliance  Comments: Diet hx reviewed and personal dietary changes discussed. Pt received a Thoughtful Eating diet ed. Physical Activity/Exercise    Reviewed Activity Log  Discussed Perceived Compliance  Reasonable Goals Set  Motivation  Comments: Pt has done well using the treadmill for 15 minutes, 4 times per week. She plans to increase. Behavior Modification    Achieving/Rewarding goals met  Positive attitude  Discussed perceived compliance  Comments: Pt is doing well exercising and plans to increase. She has decreased caffeine this month. She is working to decrease portions at dinner, increase water, and get back to her previous routine.      Candidate for surgery (per RD): pending    Dietitian: Josiane Jimenez RD

## 2018-08-31 ENCOUNTER — APPOINTMENT (OUTPATIENT)
Dept: CT IMAGING | Age: 63
End: 2018-08-31
Attending: PHYSICIAN ASSISTANT
Payer: MEDICARE

## 2018-08-31 ENCOUNTER — HOSPITAL ENCOUNTER (EMERGENCY)
Age: 63
Discharge: HOME | End: 2018-09-01
Attending: EMERGENCY MEDICINE
Payer: MEDICARE

## 2018-08-31 VITALS
HEIGHT: 61 IN | HEART RATE: 75 BPM | DIASTOLIC BLOOD PRESSURE: 69 MMHG | BODY MASS INDEX: 40.59 KG/M2 | RESPIRATION RATE: 15 BRPM | WEIGHT: 215 LBS | SYSTOLIC BLOOD PRESSURE: 138 MMHG | OXYGEN SATURATION: 97 % | TEMPERATURE: 99.9 F

## 2018-08-31 DIAGNOSIS — K52.9 COLITIS: Primary | ICD-10-CM

## 2018-08-31 LAB
ALBUMIN SERPL-MCNC: 3.4 G/DL (ref 3.4–5)
ALBUMIN/GLOB SERPL: 0.9 {RATIO} (ref 0.8–1.7)
ALP SERPL-CCNC: 100 U/L (ref 45–117)
ALT SERPL-CCNC: 104 U/L (ref 13–56)
ANION GAP SERPL CALC-SCNC: 11 MMOL/L (ref 3–18)
APPEARANCE UR: CLEAR
AST SERPL-CCNC: 101 U/L (ref 15–37)
BACTERIA URNS QL MICRO: ABNORMAL /HPF
BASOPHILS # BLD: 0 K/UL (ref 0–0.1)
BASOPHILS NFR BLD: 0 % (ref 0–2)
BILIRUB SERPL-MCNC: 0.5 MG/DL (ref 0.2–1)
BILIRUB UR QL: NEGATIVE
BUN SERPL-MCNC: 11 MG/DL (ref 7–18)
BUN/CREAT SERPL: 10 (ref 12–20)
CALCIUM SERPL-MCNC: 8.5 MG/DL (ref 8.5–10.1)
CHLORIDE SERPL-SCNC: 99 MMOL/L (ref 100–108)
CO2 SERPL-SCNC: 26 MMOL/L (ref 21–32)
COLOR UR: YELLOW
CREAT SERPL-MCNC: 1.06 MG/DL (ref 0.6–1.3)
DIFFERENTIAL METHOD BLD: ABNORMAL
EOSINOPHIL # BLD: 0.2 K/UL (ref 0–0.4)
EOSINOPHIL NFR BLD: 1 % (ref 0–5)
EPITH CASTS URNS QL MICRO: ABNORMAL /LPF (ref 0–5)
ERYTHROCYTE [DISTWIDTH] IN BLOOD BY AUTOMATED COUNT: 13.3 % (ref 11.6–14.5)
GLOBULIN SER CALC-MCNC: 3.8 G/DL (ref 2–4)
GLUCOSE SERPL-MCNC: 173 MG/DL (ref 74–99)
GLUCOSE UR STRIP.AUTO-MCNC: NEGATIVE MG/DL
HCT VFR BLD AUTO: 40 % (ref 35–45)
HGB BLD-MCNC: 13.4 G/DL (ref 12–16)
HGB UR QL STRIP: NEGATIVE
KETONES UR QL STRIP.AUTO: NEGATIVE MG/DL
LEUKOCYTE ESTERASE UR QL STRIP.AUTO: ABNORMAL
LIPASE SERPL-CCNC: 112 U/L (ref 73–393)
LYMPHOCYTES # BLD: 1.4 K/UL (ref 0.9–3.6)
LYMPHOCYTES NFR BLD: 10 % (ref 21–52)
MCH RBC QN AUTO: 32.1 PG (ref 24–34)
MCHC RBC AUTO-ENTMCNC: 33.5 G/DL (ref 31–37)
MCV RBC AUTO: 95.7 FL (ref 74–97)
MONOCYTES # BLD: 0.7 K/UL (ref 0.05–1.2)
MONOCYTES NFR BLD: 5 % (ref 3–10)
NEUTS SEG # BLD: 11.4 K/UL (ref 1.8–8)
NEUTS SEG NFR BLD: 84 % (ref 40–73)
NITRITE UR QL STRIP.AUTO: NEGATIVE
PH UR STRIP: 7.5 [PH] (ref 5–8)
PLATELET # BLD AUTO: 283 K/UL (ref 135–420)
PMV BLD AUTO: 10.1 FL (ref 9.2–11.8)
POTASSIUM SERPL-SCNC: 4.2 MMOL/L (ref 3.5–5.5)
PROT SERPL-MCNC: 7.2 G/DL (ref 6.4–8.2)
PROT UR STRIP-MCNC: NEGATIVE MG/DL
RBC # BLD AUTO: 4.18 M/UL (ref 4.2–5.3)
RBC #/AREA URNS HPF: NEGATIVE /HPF (ref 0–5)
SODIUM SERPL-SCNC: 136 MMOL/L (ref 136–145)
SP GR UR REFRACTOMETRY: 1.02 (ref 1–1.03)
UROBILINOGEN UR QL STRIP.AUTO: 1 EU/DL (ref 0.2–1)
WBC # BLD AUTO: 13.6 K/UL (ref 4.6–13.2)
WBC URNS QL MICRO: ABNORMAL /HPF (ref 0–5)

## 2018-08-31 PROCEDURE — 74011250636 HC RX REV CODE- 250/636: Performed by: PHYSICIAN ASSISTANT

## 2018-08-31 PROCEDURE — 81001 URINALYSIS AUTO W/SCOPE: CPT | Performed by: EMERGENCY MEDICINE

## 2018-08-31 PROCEDURE — 99283 EMERGENCY DEPT VISIT LOW MDM: CPT

## 2018-08-31 PROCEDURE — 74011250637 HC RX REV CODE- 250/637: Performed by: PHYSICIAN ASSISTANT

## 2018-08-31 PROCEDURE — 85025 COMPLETE CBC W/AUTO DIFF WBC: CPT | Performed by: EMERGENCY MEDICINE

## 2018-08-31 PROCEDURE — 83690 ASSAY OF LIPASE: CPT | Performed by: EMERGENCY MEDICINE

## 2018-08-31 PROCEDURE — 80053 COMPREHEN METABOLIC PANEL: CPT | Performed by: EMERGENCY MEDICINE

## 2018-08-31 PROCEDURE — 96361 HYDRATE IV INFUSION ADD-ON: CPT

## 2018-08-31 PROCEDURE — 74176 CT ABD & PELVIS W/O CONTRAST: CPT

## 2018-08-31 RX ORDER — OXYCODONE AND ACETAMINOPHEN 5; 325 MG/1; MG/1
1 TABLET ORAL
Status: COMPLETED | OUTPATIENT
Start: 2018-08-31 | End: 2018-08-31

## 2018-08-31 RX ADMIN — OXYCODONE HYDROCHLORIDE AND ACETAMINOPHEN 1 TABLET: 5; 325 TABLET ORAL at 22:51

## 2018-08-31 RX ADMIN — SODIUM CHLORIDE 1000 ML: 900 INJECTION, SOLUTION INTRAVENOUS at 22:52

## 2018-09-01 PROCEDURE — 96365 THER/PROPH/DIAG IV INF INIT: CPT

## 2018-09-01 PROCEDURE — 74011250636 HC RX REV CODE- 250/636: Performed by: PHYSICIAN ASSISTANT

## 2018-09-01 PROCEDURE — 96368 THER/DIAG CONCURRENT INF: CPT

## 2018-09-01 RX ORDER — ONDANSETRON 4 MG/1
4 TABLET, FILM COATED ORAL
Qty: 15 TAB | Refills: 0 | Status: SHIPPED | OUTPATIENT
Start: 2018-09-01 | End: 2020-06-10

## 2018-09-01 RX ORDER — METRONIDAZOLE 500 MG/1
500 TABLET ORAL 3 TIMES DAILY
Qty: 30 TAB | Refills: 0 | Status: SHIPPED | OUTPATIENT
Start: 2018-09-01 | End: 2018-09-11

## 2018-09-01 RX ORDER — DICYCLOMINE HYDROCHLORIDE 10 MG/1
10 CAPSULE ORAL 4 TIMES DAILY
Qty: 20 CAP | Refills: 0 | Status: SHIPPED | OUTPATIENT
Start: 2018-09-01 | End: 2018-09-06

## 2018-09-01 RX ORDER — CIPROFLOXACIN 2 MG/ML
400 INJECTION, SOLUTION INTRAVENOUS
Status: COMPLETED | OUTPATIENT
Start: 2018-09-01 | End: 2018-09-01

## 2018-09-01 RX ORDER — CIPROFLOXACIN 500 MG/1
500 TABLET ORAL 2 TIMES DAILY
Qty: 20 TAB | Refills: 0 | Status: SHIPPED | OUTPATIENT
Start: 2018-09-01 | End: 2018-09-11

## 2018-09-01 RX ORDER — METRONIDAZOLE 500 MG/100ML
500 INJECTION, SOLUTION INTRAVENOUS
Status: COMPLETED | OUTPATIENT
Start: 2018-09-01 | End: 2018-09-01

## 2018-09-01 RX ADMIN — CIPROFLOXACIN 400 MG: 2 INJECTION, SOLUTION INTRAVENOUS at 01:43

## 2018-09-01 RX ADMIN — METRONIDAZOLE 500 MG: 500 INJECTION, SOLUTION INTRAVENOUS at 01:43

## 2018-09-01 NOTE — ED NOTES
I have reviewed discharge instructions with the patient. The patient verbalized understanding. Trinidad Denis .Patient armband removed and shredded Patient d/c home in stable condition, ambulatory. No distress noted.

## 2018-09-01 NOTE — ED PROVIDER NOTES
EMERGENCY DEPARTMENT HISTORY AND PHYSICAL EXAM 
 
Date: 8/31/2018 Patient Name: Amy Belle History of Presenting Illness Chief Complaint Patient presents with  Abdominal Pain History Provided By: Patient Chief Complaint: Abd pain Duration: 12 Hours Timing:  Constant Location: Lower abd Quality: Cramping Modifying Factors: Advil administered at home Associated Symptoms: resolved chills and nausea Additional History (Context):  
10:40 PM 
Amy Belle is a 60 y.o. female with pertinent PMHX of GERD who presents to the emergency department C/O lower abd cramping that is similar to previous ulcerative colitis, onset this morning. Associated sxs include resolved chills and nausea. Advil administered at home w/ moderate relief. Last PO was around 7 PM. Confirms passing gas earlier tonight. Denies hx of diverticulitis. Allergic to IV contrast. Hx of hysterectomy, colectomy, and bladder tuck. Pt denies fever, urinary sxs, diarrhea, suspicious foods, and any other sxs or complaints. PCP: Boone Worrell MD 
 
Current Outpatient Prescriptions Medication Sig Dispense Refill  metroNIDAZOLE (FLAGYL) 500 mg tablet Take 1 Tab by mouth three (3) times daily for 10 days. 30 Tab 0  
 ciprofloxacin HCl (CIPRO) 500 mg tablet Take 1 Tab by mouth two (2) times a day for 10 days. 20 Tab 0  
 ondansetron hcl (ZOFRAN) 4 mg tablet Take 1 Tab by mouth every eight (8) hours as needed for Nausea. 15 Tab 0  
 dicyclomine (BENTYL) 10 mg capsule Take 1 Cap by mouth four (4) times daily for 5 days. 20 Cap 0  cholecalciferol, VITAMIN D3, (VITAMIN D3) 5,000 unit tab tablet Take  by mouth daily.  ergocalciferol (VITAMIN D2) 50,000 unit capsule Take 50,000 Units by mouth.  metoprolol (LOPRESSOR) 100 mg tablet Take 100 mg by mouth two (2) times a day.  pregabalin (LYRICA) 200 mg capsule Take 200 mg by mouth daily.  predniSONE (DELTASONE) 10 mg tablet Take 5 mg by mouth two (2) times a day.  valACYclovir (VALTREX) 500 mg tablet Take 500 mg by mouth daily.  valsartan (DIOVAN) 40 mg tablet Take 40 mg by mouth daily.  HYDROcodone-acetaminophen (NORCO) 5-325 mg per tablet Take 1 Tab by mouth every four (4) hours as needed.  CYANOCOBALAMIN, VITAMIN B-12, (B-12 DOTS PO) Take 1,000 mcg by mouth.  pantoprazole (PROTONIX) 40 mg tablet Take 40 mg by mouth daily.  zolpidem (AMBIEN) 10 mg tablet Take 10 mg by mouth nightly as needed. Past History Past Medical History: 
Past Medical History:  
Diagnosis Date  Anticoagulated   
 due to PVD  Arrhythmia   
 sees Dr. Tunde Tompkins - uses Beta Blockers for rate control  Asthma  Chronic back pain  Eczema  GERD (gastroesophageal reflux disease)  Hypercholesterolemia  Hypertension  Insufficiency, adrenal ([address]   
 pt states due to multiple steroid injection for back pain and eczema  Pre-diabetes   
 last HgbA1C (early 2017) was under 7.0  PVD (peripheral vascular disease) (HCC)   
 hx of carotid disease  Severe obesity (BMI 35.0-39. 9) with comorbidity ([address]  Severe obesity ([address]  Sleep apnea   
 does not use a device  TIA (transient ischemic attack) X 2 related to carotid disease Past Surgical History: 
Past Surgical History:  
Procedure Laterality Date [address] JOINT  HX BLADDER SUSPENSION    
 HX CAROTID ENDARTERECTOMY  07/2012  
 left  HX CERVICAL FUSION    
 HX HEMORRHOIDECTOMY  HX LAP CHOLECYSTECTOMY  HX LUMBAR FUSION    
 X 2  
 HX ORTHOPAEDIC    
 hammer toe  HX ORTHOPAEDIC    
 tendon repair foot  HX TOTAL ABDOMINAL HYSTERECTOMY Family History: 
Family History Problem Relation Age of Onset  Cancer Mother  Cancer Father Social History: 
Social History Substance Use Topics  Smoking status: Never Smoker  Smokeless tobacco: Never Used  Alcohol use No  
 
 
Allergies: Allergies Allergen Reactions  Contrast Agent [Iodine] Swelling and Cough  Other Medication Other (comments) PAPER TAPE-BLISTERS Review of Systems Review of Systems Constitutional: Positive for chills. Negative for fever. Gastrointestinal: Positive for abdominal pain (Cramping) and nausea. Negative for diarrhea. Genitourinary: Negative for dysuria, frequency and hematuria. All other systems reviewed and are negative. Physical Exam  
 
Vitals:  
 08/31/18 2108 BP: 138/69 Pulse: 75 Resp: 15 Temp: 99.9 °F (37.7 °C) SpO2: 97% Weight: 97.5 kg (215 lb) Height: 5' 1\" (1.549 m) Physical Exam  
Constitutional: She is oriented to person, place, and time. She appears well-developed and well-nourished. No distress. Non toxic, lying on stretcher, uncomfortable with movement HENT:  
Head: Normocephalic and atraumatic. Neck: Normal range of motion. Neck supple. Cardiovascular: Normal rate, regular rhythm, normal heart sounds and intact distal pulses. No murmur heard. Pulmonary/Chest: Effort normal and breath sounds normal. No respiratory distress. She has no wheezes. She has no rales. Abdominal: Soft. Bowel sounds are normal. She exhibits no distension. There is no hepatosplenomegaly. There is tenderness in the right lower quadrant, suprapubic area and left lower quadrant. There is no rebound and no CVA tenderness. TTP across lower abd, R>L, no guarding or rebound Neurological: She is alert and oriented to person, place, and time. Psychiatric: She has a normal mood and affect. Judgment normal.  
Nursing note and vitals reviewed. Diagnostic Study Results Labs - No results found for this or any previous visit (from the past 12 hour(s)). Radiologic Studies -  
CT ABD PELV WO CONT Final Result IMPRESSION: 
 
 Moderate ascending colonic thickening with mild surrounding infiltrative change 
most consistent with colitis. Small umbilical hernia containing fat. Mild fatty infiltration of the liver. As read by the radiologist.  
 
CT Results  (Last 48 hours) 09/01/18 0006  CT ABD PELV WO CONT Final result Impression:  IMPRESSION:  
   
Moderate ascending colonic thickening with mild surrounding infiltrative change  
most consistent with colitis. Small umbilical hernia containing fat. Mild fatty infiltration of the liver. Narrative:  EXAM: CT of the abdomen and pelvis INDICATION: Abdominal pain. COMPARISON: April 22, 2010. TECHNIQUE: Axial CT imaging of the abdomen and pelvis was performed without  
intravenous contrast. Multiplanar reformats were generated. Dose reduction  
techniques used: automated exposure control, adjustment of the mAs and/or kVp  
according to patient size, and iterative reconstruction techniques. _______________ FINDINGS:  
   
LOWER CHEST: There is minimal scarring at the lung bases. LIVER, BILIARY: The liver is of mild diminished attenuation. No biliary  
dilation. There has been a prior cholecystectomy. PANCREAS: Normal.  
   
SPLEEN: Normal.  
   
ADRENALS: Normal.  
   
KIDNEYS: Normal.  
   
LYMPH NODES: No enlarged lymph nodes. GASTROINTESTINAL TRACT: There is moderate thickening of the ascending colon with  
mild surrounding infiltration. PELVIC ORGANS: Unremarkable. VASCULATURE: Unremarkable. BONES: There is lumbosacral fusion with L3-L5 laminectomies. Jessica Bautista OTHER: There is a small umbilical hernia containing fat.  
   
_______________ CXR Results  (Last 48 hours) None Medications given in the ED- Medications  
sodium chloride 0.9 % bolus infusion 1,000 mL (0 mL IntraVENous IV Completed 8/31/18 1435) oxyCODONE-acetaminophen (PERCOCET) 5-325 mg per tablet 1 Tab (1 Tab Oral Given 8/31/18 8888) ciprofloxacin (CIPRO) 400 mg IVPB (premix) (0 mg IntraVENous IV Completed 9/1/18 0253) metroNIDAZOLE (FLAGYL) IVPB premix 500 mg (0 mg IntraVENous IV Completed 9/1/18 0253) Medical Decision Making I am the first provider for this patient. I reviewed the vital signs, available nursing notes, past medical history, past surgical history, family history and social history. Vital Signs-Reviewed the patient's vital signs. Pulse Oximetry Analysis - 97% on RA Records Reviewed: Nursing Notes and Old Medical Records Provider Notes (Medical Decision Making): DDX Considered Diverticulitis, constipation, gastroenteritis, appendicitis, SBO, LBO / fecal impaction, colitis, IBS, inflammatory bowel disease (Crohn's or ulcerative colitis), mesenteric ischemia, hernia (femoral, inguinal, umbilical), renal calculi Procedures: 
Procedures ED Course:  
10:40 PM Initial assessment performed. The patients presenting problems have been discussed, and they are in agreement with the care plan formulated and outlined with them. I have encouraged them to ask questions as they arise throughout their visit. 1:25 AM Discussed patient's history, exam, and available diagnostics results with Abhi Loera MD, ED Medicine, who agrees with plan. Discussion Pt with hx of colitis presents with right abd pain. Slight elevation in WBC but pt is afebrile, exam reassuring. CT shows colitis but no abscess or perf. First dose of Cipro and Flagyl given IV in ED. Pt is in pain management and does not want any rx that could jeopardize her contract with them. She has a few Norco at home. Strict return precautions given. Pt understands and agrees with the plan. Diagnosis and Disposition DISCHARGE NOTE: 
1:26 AM 
Cece Younger's  results have been reviewed with her.   She has been counseled regarding her diagnosis, treatment, and plan. She verbally conveys understanding and agreement of the signs, symptoms, diagnosis, treatment and prognosis and additionally agrees to follow up as discussed. She also agrees with the care-plan and conveys that all of her questions have been answered. I have also provided discharge instructions for her that include: educational information regarding their diagnosis and treatment, and list of reasons why they would want to return to the ED prior to their follow-up appointment, should her condition change. She has been provided with education for proper emergency department utilization. CLINICAL IMPRESSION: 
 
1. Colitis PLAN: 
1. D/C Home 2. Discharge Medication List as of 9/1/2018  1:27 AM  
  
CONTINUE these medications which have NOT CHANGED Details  
cholecalciferol, VITAMIN D3, (VITAMIN D3) 5,000 unit tab tablet Take  by mouth daily. , Historical Med  
  
ergocalciferol (VITAMIN D2) 50,000 unit capsule Take 50,000 Units by mouth., Historical Med  
  
metoprolol (LOPRESSOR) 100 mg tablet Take 100 mg by mouth two (2) times a day. Historical Med, 100 mg  
  
pregabalin (LYRICA) 200 mg capsule Take 200 mg by mouth daily. Historical Med, 200 mg  
  
predniSONE (DELTASONE) 10 mg tablet Take 5 mg by mouth two (2) times a day., Historical Med  
  
valACYclovir (VALTREX) 500 mg tablet Take 500 mg by mouth daily. Historical Med, 500 mg  
  
valsartan (DIOVAN) 40 mg tablet Take 40 mg by mouth daily. Historical Med, 40 mg HYDROcodone-acetaminophen (NORCO) 5-325 mg per tablet Take 1 Tab by mouth every four (4) hours as needed. Historical Med, 1 Tab CYANOCOBALAMIN, VITAMIN B-12, (B-12 DOTS PO) Take 1,000 mcg by mouth., Historical Med  
  
pantoprazole (PROTONIX) 40 mg tablet Take 40 mg by mouth daily. Historical Med, 40 mg  
  
zolpidem (AMBIEN) 10 mg tablet Take 10 mg by mouth nightly as needed. Historical Med, 10 mg  
  
  
 
3. Follow-up Information Follow up With Details Comments Contact Info Audi Silva MD Schedule an appointment as soon as possible for a visit in 2 days For GI follow up [address] [place] [address] 
[phone] [facility] EMERGENCY DEPT Go to As needed, If symptoms worsen [address] [id number] 
[phone]  
  
 
_______________________________ Attestations: This note is prepared by David Juan, acting as Scribe for Heather Mendez PA-C. Heather Mendez PA-C:  The scribe's documentation has been prepared under my direction and personally reviewed by me in its entirety. I confirm that the note above accurately reflects all work, treatment, procedures, and medical decision making performed by me. 
_______________________________

## 2018-09-01 NOTE — DISCHARGE INSTRUCTIONS
Colitis: Care Instructions  Your Care Instructions  Colitis is the medical term for swelling (inflammation) of the intestine. It can be caused by different things, such as an infection or loss of blood flow in the intestine. Other causes are problems like Crohn's disease or ulcerative colitis. Symptoms may include fever, diarrhea that may be bloody, or belly pain. Sometimes symptoms go away without treatment. But you may need treatment or more tests, such as blood tests or a stool test. Or you may need imaging tests like a CT scan or a colonoscopy. In some cases, the doctor may want to test a sample of tissue from the intestine. This test is called a biopsy. The doctor has checked you carefully, but problems can develop later. If you notice any problems or new symptoms, get medical treatment right away. Follow-up care is a key part of your treatment and safety. Be sure to make and go to all appointments, and call your doctor if you are having problems. It's also a good idea to know your test results and keep a list of the medicines you take. How can you care for yourself at home? · Rest until you feel better. · Your doctor may recommend that you eat bland foods. These include rice, dry toast or crackers, bananas, and applesauce. · To prevent dehydration, drink plenty of fluids. Choose water and other caffeine-free clear liquids until you feel better. If you have kidney, heart, or liver disease and have to limit fluids, talk with your doctor before you increase the amount of fluids you drink. · Be safe with medicines. Take your medicines exactly as prescribed. Call your doctor if you think you are having a problem with your medicine. You will get more details on the specific medicines your doctor prescribes. When should you call for help? Call 911 anytime you think you may need emergency care. For example, call if:    · You passed out (lost consciousness).     · Your stools are maroon or very bloody.  Call your doctor now or seek immediate medical care if:    · You have new or worse belly pain.     · You have a fever.     · You are vomiting.     · You cannot pass stools or gas.     · You have new or more blood in your stools.    Watch closely for changes in your health, and be sure to contact your doctor if:    · You have new or worse symptoms.     · You are losing weight.     · You do not get better as expected. Where can you learn more? Go to http://silvana-rasta.info/. Ifeoma Sue in the search box to learn more about \"Colitis: Care Instructions. \"  Current as of: May 12, 2017  Content Version: 11.7  © 1282-4824 "[employer]. Care instructions adapted under license by [employer] (which disclaims liability or warranty for this information). If you have questions about a medical condition or this instruction, always ask your healthcare professional. [address] any warranty or liability for your use of this information.

## 2018-09-01 NOTE — ED TRIAGE NOTES
Pt presents to ED with having RLQ abd pain/discomfort since this AM.  Some nausea this AM that reports to have resolved at the present. \"It feels the same as I had colitis. \"  Pt denying any urinary sxms.

## 2019-01-08 ENCOUNTER — HOSPITAL ENCOUNTER (OUTPATIENT)
Dept: LAB | Age: 64
Discharge: HOME | End: 2019-01-08
Payer: MEDICARE

## 2019-01-08 LAB
FAX TO INFO,FAXT: NORMAL
FAX TO NUMBER,FAXN: NORMAL
HCT VFR BLD AUTO: 41.7 % (ref 35–45)
HGB BLD-MCNC: 13.3 G/DL (ref 12–16)
POTASSIUM SERPL-SCNC: 4.8 MMOL/L (ref 3.5–5.5)

## 2019-01-08 PROCEDURE — 85018 HEMOGLOBIN: CPT

## 2019-01-08 PROCEDURE — 84132 ASSAY OF SERUM POTASSIUM: CPT

## 2019-01-08 PROCEDURE — 36415 COLL VENOUS BLD VENIPUNCTURE: CPT

## 2019-01-10 ENCOUNTER — HOSPITAL ENCOUNTER (OUTPATIENT)
Dept: LAB | Age: 64
Discharge: HOME | End: 2019-01-10

## 2019-01-10 LAB — XX-LABCORP SPECIMEN COL,LCBCF: NORMAL

## 2019-01-10 PROCEDURE — 99001 SPECIMEN HANDLING PT-LAB: CPT

## 2020-06-10 ENCOUNTER — HOSPITAL ENCOUNTER (EMERGENCY)
Age: 65
Discharge: HOME | End: 2020-06-10
Attending: EMERGENCY MEDICINE
Payer: MEDICARE

## 2020-06-10 ENCOUNTER — APPOINTMENT (OUTPATIENT)
Dept: CT IMAGING | Age: 65
End: 2020-06-10
Attending: EMERGENCY MEDICINE
Payer: MEDICARE

## 2020-06-10 VITALS
HEART RATE: 57 BPM | OXYGEN SATURATION: 95 % | HEIGHT: 61 IN | RESPIRATION RATE: 23 BRPM | BODY MASS INDEX: 41.54 KG/M2 | TEMPERATURE: 98.4 F | WEIGHT: 220 LBS | SYSTOLIC BLOOD PRESSURE: 104 MMHG | DIASTOLIC BLOOD PRESSURE: 52 MMHG

## 2020-06-10 DIAGNOSIS — R73.9 HYPERGLYCEMIA: ICD-10-CM

## 2020-06-10 DIAGNOSIS — R53.1 GENERALIZED WEAKNESS: Primary | ICD-10-CM

## 2020-06-10 LAB
ALBUMIN SERPL-MCNC: 3.3 G/DL (ref 3.4–5)
ALBUMIN/GLOB SERPL: 1 {RATIO} (ref 0.8–1.7)
ALP SERPL-CCNC: 95 U/L (ref 45–117)
ALT SERPL-CCNC: 51 U/L (ref 13–56)
ANION GAP SERPL CALC-SCNC: 6 MMOL/L (ref 3–18)
APPEARANCE UR: CLEAR
AST SERPL-CCNC: 20 U/L (ref 10–38)
ATRIAL RATE: 72 BPM
BASOPHILS # BLD: 0 K/UL (ref 0–0.1)
BASOPHILS NFR BLD: 0 % (ref 0–2)
BILIRUB SERPL-MCNC: 0.6 MG/DL (ref 0.2–1)
BILIRUB UR QL: NEGATIVE
BUN SERPL-MCNC: 16 MG/DL (ref 7–18)
BUN/CREAT SERPL: 15 (ref 12–20)
CALCIUM SERPL-MCNC: 8.7 MG/DL (ref 8.5–10.1)
CALCULATED P AXIS, ECG09: 58 DEGREES
CALCULATED R AXIS, ECG10: 3 DEGREES
CALCULATED T AXIS, ECG11: 55 DEGREES
CHLORIDE SERPL-SCNC: 107 MMOL/L (ref 100–111)
CK MB CFR SERPL CALC: NORMAL % (ref 0–4)
CK MB SERPL-MCNC: <1 NG/ML (ref 5–25)
CK SERPL-CCNC: 91 U/L (ref 26–192)
CO2 SERPL-SCNC: 28 MMOL/L (ref 21–32)
COLOR UR: YELLOW
CREAT SERPL-MCNC: 1.05 MG/DL (ref 0.6–1.3)
DIAGNOSIS, 93000: NORMAL
DIFFERENTIAL METHOD BLD: ABNORMAL
EOSINOPHIL # BLD: 0.3 K/UL (ref 0–0.4)
EOSINOPHIL NFR BLD: 4 % (ref 0–5)
ERYTHROCYTE [DISTWIDTH] IN BLOOD BY AUTOMATED COUNT: 15.6 % (ref 11.6–14.5)
GLOBULIN SER CALC-MCNC: 3.3 G/DL (ref 2–4)
GLUCOSE BLD STRIP.AUTO-MCNC: 268 MG/DL (ref 70–110)
GLUCOSE SERPL-MCNC: 238 MG/DL (ref 74–99)
GLUCOSE UR STRIP.AUTO-MCNC: NEGATIVE MG/DL
HCT VFR BLD AUTO: 40.4 % (ref 35–45)
HGB BLD-MCNC: 13.1 G/DL (ref 12–16)
HGB UR QL STRIP: NEGATIVE
INR PPP: 1 (ref 0.8–1.2)
KETONES UR QL STRIP.AUTO: NEGATIVE MG/DL
LEUKOCYTE ESTERASE UR QL STRIP.AUTO: NEGATIVE
LYMPHOCYTES # BLD: 2.4 K/UL (ref 0.9–3.6)
LYMPHOCYTES NFR BLD: 30 % (ref 21–52)
MAGNESIUM SERPL-MCNC: 1.9 MG/DL (ref 1.6–2.6)
MCH RBC QN AUTO: 31.3 PG (ref 24–34)
MCHC RBC AUTO-ENTMCNC: 32.4 G/DL (ref 31–37)
MCV RBC AUTO: 96.4 FL (ref 74–97)
MONOCYTES # BLD: 0.7 K/UL (ref 0.05–1.2)
MONOCYTES NFR BLD: 9 % (ref 3–10)
NEUTS SEG # BLD: 4.5 K/UL (ref 1.8–8)
NEUTS SEG NFR BLD: 57 % (ref 40–73)
NITRITE UR QL STRIP.AUTO: NEGATIVE
P-R INTERVAL, ECG05: 194 MS
PH UR STRIP: 6 [PH] (ref 5–8)
PLATELET # BLD AUTO: 273 K/UL (ref 135–420)
PMV BLD AUTO: 9.9 FL (ref 9.2–11.8)
POTASSIUM SERPL-SCNC: 3.7 MMOL/L (ref 3.5–5.5)
PROT SERPL-MCNC: 6.6 G/DL (ref 6.4–8.2)
PROT UR STRIP-MCNC: NEGATIVE MG/DL
PROTHROMBIN TIME: 12.6 SEC (ref 11.5–15.2)
Q-T INTERVAL, ECG07: 422 MS
QRS DURATION, ECG06: 98 MS
QTC CALCULATION (BEZET), ECG08: 462 MS
RBC # BLD AUTO: 4.19 M/UL (ref 4.2–5.3)
SODIUM SERPL-SCNC: 141 MMOL/L (ref 136–145)
SP GR UR REFRACTOMETRY: 1.01 (ref 1–1.03)
TROPONIN I SERPL-MCNC: <0.02 NG/ML (ref 0–0.04)
UROBILINOGEN UR QL STRIP.AUTO: 0.2 EU/DL (ref 0.2–1)
VENTRICULAR RATE, ECG03: 72 BPM
WBC # BLD AUTO: 7.9 K/UL (ref 4.6–13.2)

## 2020-06-10 PROCEDURE — 99285 EMERGENCY DEPT VISIT HI MDM: CPT

## 2020-06-10 PROCEDURE — 81003 URINALYSIS AUTO W/O SCOPE: CPT

## 2020-06-10 PROCEDURE — 80053 COMPREHEN METABOLIC PANEL: CPT

## 2020-06-10 PROCEDURE — 93005 ELECTROCARDIOGRAM TRACING: CPT

## 2020-06-10 PROCEDURE — 82962 GLUCOSE BLOOD TEST: CPT

## 2020-06-10 PROCEDURE — 82550 ASSAY OF CK (CPK): CPT

## 2020-06-10 PROCEDURE — 85025 COMPLETE CBC W/AUTO DIFF WBC: CPT

## 2020-06-10 PROCEDURE — 83735 ASSAY OF MAGNESIUM: CPT

## 2020-06-10 PROCEDURE — 85610 PROTHROMBIN TIME: CPT

## 2020-06-10 PROCEDURE — 70450 CT HEAD/BRAIN W/O DYE: CPT

## 2020-06-10 NOTE — DISCHARGE INSTRUCTIONS
You were seen and evaluated in the Emergency Department. Please understand that your work up is not all encompassing and you should follow up with your primary care physician for further management and continuity of care. Please return to Emergency Department or seek medical attention immediately if you have acute worsening in your symptoms or develop chest pain, shortness of breath, repeated vomiting, fever, altered level of consciousness, coughing up blood, or start sweating and feel clammy. If you were prescribed any medicine for home, please take as prescribed by your health-care provider. If you were given any follow-up appointments or numbers to call, please do so as instructed. Avoid any tobacco products or excessive alcohol. Patient Education        Learning About High Blood Sugar  What is high blood sugar? Your body turns the food you eat into glucose (sugar), which it uses for energy. But if your body isn't able to use the sugar right away, it can build up in your blood and lead to high blood sugar. When the amount of sugar in your blood stays too high for too much of the time, you may have diabetes. Diabetes is a disease that can cause serious health problems. The good news is that lifestyle changes may help you get your blood sugar back to normal and avoid or delay diabetes. What causes high blood sugar? Sugar (glucose) can build up in your blood if you:  · Are overweight. · Have a family history of diabetes. · Take certain medicines, such as steroids. What are the symptoms? Having high blood sugar may not cause any symptoms at all. Or it may make you feel very thirsty or very hungry. You may also urinate more often than usual, have blurry vision, or lose weight without trying. How is high blood sugar treated? You can take steps to lower your blood sugar level if you understand what makes it get higher.  Your doctor may want you to learn how to test your blood sugar level at home. Then you can see how illness, stress, or different kinds of food or medicine raise or lower your blood sugar level. Other tests may be needed to see if you have diabetes. How can you prevent high blood sugar? · Watch your weight. If you're overweight, losing just a small amount of weight may help. Reducing fat around your waist is most important. · Limit the amount of calories, sweets, and unhealthy fat you eat. Ask your doctor if a dietitian can help you. A registered dietitian can help you create meal plans that fit your lifestyle. · Get at least 30 minutes of exercise on most days of the week. Exercise helps control your blood sugar. It also helps you maintain a healthy weight. Walking is a good choice. You also may want to do other activities, such as running, swimming, cycling, or playing tennis or team sports. · If your doctor prescribed medicines, take them exactly as prescribed. Call your doctor if you think you are having a problem with your medicine. You will get more details on the specific medicines your doctor prescribes. Follow-up care is a key part of your treatment and safety. Be sure to make and go to all appointments, and call your doctor if you are having problems. It's also a good idea to know your test results and keep a list of the medicines you take. Where can you learn more? Go to http://silvana-rasta.info/  Enter O108 in the search box to learn more about \"Learning About High Blood Sugar. \"  Current as of: December 20, 2019               Content Version: 12.5  © 3861-2375 Healthwise, Incorporated. Care instructions adapted under license by [employer] (which disclaims liability or warranty for this information). If you have questions about a medical condition or this instruction, always ask your healthcare professional. [address] any warranty or liability for your use of this information.              Patient Education Muscle Conditioning: Exercises  Introduction  Here are some examples of exercises for muscle conditioning. Start each exercise slowly. Ease off the exercise if you start to have pain. Your doctor or physical therapist will tell you when you can start these exercises and which ones will work best for you. How to do the exercises  Wall push-ups   When you can do this exercise against a wall comfortably (without your muscles feeling tired), you can try it against a counter. Start with 5 repetitions again and work up to 8 to 12. You can then slowly progress to the end of a couch or a sturdy chair, and finally to the floor. 1. Stand facing a wall, about 12 to 18 inches away. 2. Place your hands on the wall at shoulder height. 3. Slowly bend your elbows and bring your face toward the wall, moving your hips and shoulders forward together. 4. Push slowly back to the starting position. 5. Start with 5 repetitions and work up to 8 to 12. 6. Rest for a minute, and repeat the exercise. Knee extension   If this exercise becomes easy, you can add a light weight around your ankle or tie an elastic resistance band to a chair leg and one ankle. 1. While sitting in a chair, straighten one leg and hold while you slowly count to 5. Be sure you do not lock your knee. 2. Repeat 8 to 12 times. 3. Rest for a minute, and repeat the exercise. 4. Do the same exercise with the other leg. Side-lying leg lift   If this exercise becomes easy, you can add a light weight around your ankle or tie an elastic resistance band to both ankles. 1. Lie on your side, with your legs extended. Keep your hips straight up and down during this exercise. Do not let your top hip rock toward the back. Support your head with your hand, and place the other hand on the floor near your waist.  2. Slowly raise your upper leg until it is about in line with your shoulder. Keep your toes pointed forward.   3. Slowly lower your leg to the starting position. 4. Repeat 8 to 12 times. 5. Rest for a minute, and repeat the exercise. 6. Turn to your other side and do the same exercise with your other leg. Shallow standing knee bends   1. Stand with your hands lightly resting on a counter or chair in front of you with your feet shoulder-width apart. 2. Slowly bend your knees so that you squat down just like you were going to sit in a chair. Make sure your knees do not go in front of your toes. 3. Lower yourself about 6 inches. Your heels should remain on the floor at all times. 4. Rise slowly to a standing position. 5. Repeat 8 to 12 times. 6. Rest for a minute, and repeat the exercise. Follow-up care is a key part of your treatment and safety. Be sure to make and go to all appointments, and call your doctor if you are having problems. It's also a good idea to know your test results and keep a list of the medicines you take. Where can you learn more? Go to http://silvana-rasta.info/  Enter P608 in the search box to learn more about \"Muscle Conditioning: Exercises. \"  Current as of: January 16, 2020               Content Version: 12.5  © 2006-2020 Healthwise, [employer]. Care instructions adapted under license by [employer] (which disclaims liability or warranty for this information). If you have questions about a medical condition or this instruction, always ask your healthcare professional. [place] [address] any warranty or liability for your use of this information. Patient Education        Weakness: Care Instructions  Your Care Instructions     Weakness is a lack of physical or muscle strength. You may feel that you need to make extra effort to move your arms, legs, or other muscles. Generalized weakness means that you feel weak in most areas of your body. Another type of weakness may affect just one muscle or group of muscles.   You may feel weak and tired after you have done too much activity, such as taking an extra-long hike. This is not a serious problem. It often goes away on its own. Feeling weak can also be caused by medical conditions like thyroid problems, depression, or a virus. Sometimes the cause can be serious. Your doctor may want to do more tests to try to find the cause of the weakness. The doctor has checked you carefully, but problems can develop later. If you notice any problems or new symptoms, get medical treatment right away. Follow-up care is a key part of your treatment and safety. Be sure to make and go to all appointments, and call your doctor if you are having problems. It's also a good idea to know your test results and keep a list of the medicines you take. How can you care for yourself at home? · Rest when you feel tired. · Be safe with medicines. If your doctor prescribed medicine, take it exactly as prescribed. Call your doctor if you think you are having a problem with your medicine. You will get more details on the specific medicines your doctor prescribes. · Do not skip meals. Eating a balanced diet may increase your energy level. · Get some physical activity every day, but do not get too tired. When should you call for help? Call your doctor now or seek immediate medical care if:  · You have new or worse weakness. · You are dizzy or lightheaded, or you feel like you may faint. Watch closely for changes in your health, and be sure to contact your doctor if:  · You do not get better as expected. Where can you learn more? Go to http://silvana-rasta.info/  Enter V492 in the search box to learn more about \"Weakness: Care Instructions. \"  Current as of: June 26, 2019               Content Version: 12.5  © 8770-8744 Healthwise, Incorporated. Care instructions adapted under license by [employer] (which disclaims liability or warranty for this information).  If you have questions about a medical condition or this instruction, always ask your healthcare professional. [place] [address] any warranty or liability for your use of this information.

## 2020-06-10 NOTE — ED PROVIDER NOTES
EMERGENCY DEPARTMENT HISTORY AND PHYSICAL EXAM    Date: 6/10/2020  Patient Name: Halima Clemons    History of Presenting Illness     Chief Complaint   Patient presents with   [address] [facility] [address] Fall    Extremity Weakness         History Provided By: Patient    Additional History (Context):   Halima Clemons is a 71 y.o. female with PMHX hypertension, diabetes, chronic back pain presents to the emergency department C/O frequent falls over the last 2 weeks because \"my knees give out\". Patient reports that her last fall was yesterday. States that at 9 PM she attempted to get out of bed when she felt \"my knees gave out\". States that she was able to fall onto her knees. States that she was able to get up and has been ambulating since that time. Patient unsure if she hit her head or had any loss of consciousness. Patient denying any numbness. Pt denies pain, shortness of breath, worsening back pain, bowel or bladder incontinence, fever, nausea, vomiting, chest pain, abdominal pain, and any other sxs or complaints. PCP: Bria Avalos MD    Current Outpatient Medications   Medication Sig Dispense Refill    cholecalciferol, VITAMIN D3, (VITAMIN D3) 5,000 unit tab tablet Take  by mouth daily.  ergocalciferol (VITAMIN D2) 50,000 unit capsule Take 50,000 Units by mouth.  CYANOCOBALAMIN, VITAMIN B-12, (B-12 DOTS PO) Take 1,000 mcg by mouth.  metoprolol (LOPRESSOR) 100 mg tablet Take 100 mg by mouth two (2) times a day.  pregabalin (LYRICA) 200 mg capsule Take 200 mg by mouth daily.  pantoprazole (PROTONIX) 40 mg tablet Take 40 mg by mouth daily.  zolpidem (AMBIEN) 10 mg tablet Take 10 mg by mouth nightly as needed.  predniSONE (DELTASONE) 10 mg tablet Take 5 mg by mouth two (2) times a day.  valACYclovir (VALTREX) 500 mg tablet Take 500 mg by mouth daily.  valsartan (DIOVAN) 40 mg tablet Take 40 mg by mouth daily.         HYDROcodone-acetaminophen (NORCO) 5-325 mg per tablet Take 1 Tab by mouth every four (4) hours as needed. Past History     Past Medical History:  Past Medical History:   Diagnosis Date    Anticoagulated     due to PVD    Arrhythmia     sees Dr. Teagan Perez - uses Beta Blockers for rate control    Asthma     Chronic back pain     Eczema     GERD (gastroesophageal reflux disease)     Hypercholesterolemia     Hypertension     Insufficiency, adrenal ([address]     pt states due to multiple steroid injection for back pain and eczema    Pre-diabetes     last HgbA1C (early 2017) was under 7.0    PVD (peripheral vascular disease) (HCC)     hx of carotid disease    Severe obesity (BMI 35.0-39. 9) with comorbidity ([address]     Severe obesity ([address]     Sleep apnea     does not use a device    TIA (transient ischemic attack)     X 2 related to carotid disease       Past Surgical History:  Past Surgical History:   Procedure Laterality Date    FUSION OF SACROILIAC JOINT      HX BLADDER SUSPENSION      HX CAROTID ENDARTERECTOMY  07/2012    left    HX CERVICAL FUSION      HX HEMORRHOIDECTOMY      HX LAP CHOLECYSTECTOMY      HX LUMBAR FUSION      X 2    HX ORTHOPAEDIC      hammer toe    HX ORTHOPAEDIC      tendon repair foot    HX TOTAL ABDOMINAL HYSTERECTOMY         Family History:  Family History   Problem Relation Age of Onset    Cancer Mother     Cancer Father        Social History:  Social History     Tobacco Use    Smoking status: Never Smoker    Smokeless tobacco: Never Used   Substance Use Topics    Alcohol use: No    Drug use: No       Allergies: Allergies   Allergen Reactions    Contrast Agent [Iodine] Swelling and Cough    Other Medication Other (comments)     PAPER TAPE-BLISTERS         Review of Systems   Review of Systems   Constitutional: Negative for chills and fever. HENT: Negative for congestion, ear pain, sinus pain and sore throat. Eyes: Negative for pain and visual disturbance.    Respiratory: Negative for cough and shortness of breath. Cardiovascular: Negative for chest pain and leg swelling. Gastrointestinal: Negative for abdominal pain, constipation, diarrhea, nausea and vomiting. Genitourinary: Negative for dysuria, hematuria, vaginal bleeding and vaginal discharge. Musculoskeletal: Negative for back pain and neck pain. Skin: Negative for rash and wound. Neurological: Positive for weakness. Negative for dizziness, tremors, light-headedness and numbness. All other systems reviewed and are negative. Physical Exam     Vitals:    06/10/20 1545 06/10/20 1600 06/10/20 1615 06/10/20 1630   BP: 106/62 115/62 (!) 88/64 100/55   Pulse: 61 (!) 59 62 60   Resp: 22 22 28 24   Temp:       SpO2: 95% 95% 96% 96%   Weight:       Height:         Physical Exam    Nursing note and vitals reviewed    Constitutional: Morbidly obese  female, no acute distress  Head: Normocephalic, Atraumatic  Eyes: Pupils are equal, round, and reactive to light, EOMI  Neck: Supple, non-tender  Cardiovascular: Regular rate and rhythm, no murmurs, rubs, or gallops, + 2 radial pulse  Chest: Normal work of breathing and chest excursion bilaterally  Lungs: Clear to ausculation bilaterally, no wheezes, no rhonchi  Abdomen: Soft, non tender, non distended, normoactive bowel sounds  Back: No evidence of trauma or deformity  Extremities: No evidence of trauma or deformity, no LE edema. No streaking erythema, vesicular lesions, ulcerations or bulla  Skin: Warm and dry, normal cap refill  Neuro: Alert and appropriate, CN intact, normal speech, moving all 4 extremities freely and symmetrically. Motor: Normal tone and bulk. No abnormal movements appreciated. No pronator drift. Strength tested and 5/5 in bilateral wrist flexion/extension, elbow flexion/extension, shoulder abduction, straight leg raise, knee flexion/extension, ankle dorsiflexion/plantarflexion. Patient ambulates with a steady gait.   Psychiatric: Normal mood and affect Diagnostic Study Results     Labs -     Recent Results (from the past 12 hour(s))   CBC WITH AUTOMATED DIFF    Collection Time: 06/10/20  1:15 PM   Result Value Ref Range    WBC 7.9 4.6 - 13.2 K/uL    RBC 4.19 (L) 4.20 - 5.30 M/uL    HGB 13.1 12.0 - 16.0 g/dL    HCT 40.4 35.0 - 45.0 %    MCV 96.4 74.0 - 97.0 FL    MCH 31.3 24.0 - 34.0 PG    MCHC 32.4 31.0 - 37.0 g/dL    RDW 15.6 (H) 11.6 - 14.5 %    PLATELET 660 914 - 947 K/uL    MPV 9.9 9.2 - 11.8 FL    NEUTROPHILS 57 40 - 73 %    LYMPHOCYTES 30 21 - 52 %    MONOCYTES 9 3 - 10 %    EOSINOPHILS 4 0 - 5 %    BASOPHILS 0 0 - 2 %    ABS. NEUTROPHILS 4.5 1.8 - 8.0 K/UL    ABS. LYMPHOCYTES 2.4 0.9 - 3.6 K/UL    ABS. MONOCYTES 0.7 0.05 - 1.2 K/UL    ABS. EOSINOPHILS 0.3 0.0 - 0.4 K/UL    ABS. BASOPHILS 0.0 0.0 - 0.1 K/UL    DF AUTOMATED     METABOLIC PANEL, COMPREHENSIVE    Collection Time: 06/10/20  1:15 PM   Result Value Ref Range    Sodium 141 136 - 145 mmol/L    Potassium 3.7 3.5 - 5.5 mmol/L    Chloride 107 100 - 111 mmol/L    CO2 28 21 - 32 mmol/L    Anion gap 6 3.0 - 18 mmol/L    Glucose 238 (H) 74 - 99 mg/dL    BUN 16 7.0 - 18 MG/DL    Creatinine 1.05 0.6 - 1.3 MG/DL    BUN/Creatinine ratio 15 12 - 20      GFR est AA >60 >60 ml/min/1.73m2    GFR est non-AA 53 (L) >60 ml/min/1.73m2    Calcium 8.7 8.5 - 10.1 MG/DL    Bilirubin, total 0.6 0.2 - 1.0 MG/DL    ALT (SGPT) 51 13 - 56 U/L    AST (SGOT) 20 10 - 38 U/L    Alk.  phosphatase 95 45 - 117 U/L    Protein, total 6.6 6.4 - 8.2 g/dL    Albumin 3.3 (L) 3.4 - 5.0 g/dL    Globulin 3.3 2.0 - 4.0 g/dL    A-G Ratio 1.0 0.8 - 1.7     CARDIAC PANEL,(CK, CKMB & TROPONIN)    Collection Time: 06/10/20  1:15 PM   Result Value Ref Range    CK - MB <1.0 <3.6 ng/ml    CK-MB Index  0.0 - 4.0 %     CALCULATION NOT PERFORMED WHEN RESULT IS BELOW LINEAR LIMIT    CK 91 26 - 192 U/L    Troponin-I, QT <0.02 0.0 - 0.045 NG/ML   PROTHROMBIN TIME + INR    Collection Time: 06/10/20  1:15 PM   Result Value Ref Range    Prothrombin time 12.6 11.5 - 15.2 sec    INR 1.0 0.8 - 1.2     MAGNESIUM    Collection Time: 06/10/20  1:15 PM   Result Value Ref Range    Magnesium 1.9 1.6 - 2.6 mg/dL   GLUCOSE, POC    Collection Time: 06/10/20  1:19 PM   Result Value Ref Range    Glucose (POC) 268 (H) 70 - 110 mg/dL   EKG, 12 LEAD, INITIAL    Collection Time: 06/10/20  1:21 PM   Result Value Ref Range    Ventricular Rate 72 BPM    Atrial Rate 72 BPM    P-R Interval 194 ms    QRS Duration 98 ms    Q-T Interval 422 ms    QTC Calculation (Bezet) 462 ms    Calculated P Axis 58 degrees    Calculated R Axis 3 degrees    Calculated T Axis 55 degrees    Diagnosis       Normal sinus rhythm  T wave abnormality, consider anterior ischemia  Abnormal ECG  When compared with ECG of 06-JAN-2013 00:23,  T wave inversion now evident in Anterior leads     URINALYSIS W/ RFLX MICROSCOPIC    Collection Time: 06/10/20  4:50 PM   Result Value Ref Range    Color YELLOW      Appearance CLEAR      Specific gravity 1.013 1.005 - 1.030      pH (UA) 6.0 5.0 - 8.0      Protein Negative NEG mg/dL    Glucose Negative NEG mg/dL    Ketone Negative NEG mg/dL    Bilirubin Negative NEG      Blood Negative NEG      Urobilinogen 0.2 0.2 - 1.0 EU/dL    Nitrites Negative NEG      Leukocyte Esterase Negative NEG         Radiologic Studies -   CT HEAD WO CONT   Final Result   IMPRESSION:      1. No acute intracranial abnormalities. CT Results  (Last 48 hours)               06/10/20 1517  CT HEAD WO CONT Final result    Impression:  IMPRESSION:       1. No acute intracranial abnormalities. Narrative:  EXAM: CT head       INDICATION: Generalized weakness. COMPARISON: None. TECHNIQUE: Axial CT imaging of the head was performed without intravenous   contrast. One or more dose reduction techniques were used on this CT: automated   exposure control, adjustment of the mAs and/or kVp according to patient size,   and iterative reconstruction techniques.   The specific techniques used on this   CT exam have been documented in the patient's electronic medical record. Digital Imaging and Communications in Medicine (DICOM) format image data are   available to nonaffiliated external healthcare facilities or entities on a   secured, media free, reciprocally searchable basis with patient authorization   for at least a 12-month period after this study. _______________       FINDINGS:       BRAIN AND POSTERIOR FOSSA: There is no intracranial hemorrhage, mass effect, or   midline shift. There are no areas of abnormal parenchymal attenuation. EXTRA-AXIAL SPACES AND MENINGES: There are no abnormal extra-axial fluid   collections. CALVARIUM: Intact. SINUSES: Clear. OTHER: None.       _______________               CXR Results  (Last 48 hours)    None            Medical Decision Making   I am the first provider for this patient. I reviewed the vital signs, available nursing notes, past medical history, past surgical history, family history and social history. Vital Signs-Reviewed the patient's vital signs. Pulse Oximetry Analysis -99 % on room air    Cardiac Monitor:  Rate: 79 bpm  Rhythm: Regular    EKG interpretation: (Preliminary)  1:34 PM   Normal sinus rhythm at 72 beats minute. SD interval 194 ms. QRS 98 ms. QTc 462 ms. No acute ST elevation  Records Reviewed: Nursing Notes and Old Medical Records    Provider Notes:    71 y.o. female presenting with 2 weeks of \"legs giving out\". On exam patient able to ambulate with a steady gait. She is afebrile with appropriate vital signs. Does not appear acutely ill or toxic. She has no focal neurological deficits, no facial droop or dysarthria. As patient symptoms have been ongoing for 2 weeks, there is no indication for acute stroke and patient's history and exam is not consistent with a stroke. With her recent fall, will obtain a CT scan of her head.   Will evaluate for any metabolic derangements, infectious etiology that may explain her generalized weakness. .    Procedures:  Procedures    ED Course:   1:20 PM   Initial assessment performed. The patients presenting problems have been discussed, and they are in agreement with the care plan formulated and outlined with them. I have encouraged them to ask questions as they arise throughout their visit. 5:12 PM  Patient's lab work showing no leukocytosis or bandemia. Hemoglobin stable at 13.1. Noted to have a hyperglycemia however not consistent with DKA. Troponin is negative. UA not consistent with UTI. Patient able to ambulate with a steady gait. CT scan showing no acute process. Discussed patient's hyperglycemia and urged close follow-up with her PCP. Diagnosis and Disposition       DISCHARGE NOTE:  5:12 PM    Cece Younger's  results have been reviewed with her. She has been counseled regarding her diagnosis, treatment, and plan. She verbally conveys understanding and agreement of the signs, symptoms, diagnosis, treatment and prognosis and additionally agrees to follow up as discussed. She also agrees with the care-plan and conveys that all of her questions have been answered. I have also provided discharge instructions for her that include: educational information regarding their diagnosis and treatment, and list of reasons why they would want to return to the ED prior to their follow-up appointment, should her condition change. She has been provided with education for proper emergency department utilization. CLINICAL IMPRESSION:    1. Generalized weakness    2. Hyperglycemia        PLAN:  1. D/C Home  2. Current Discharge Medication List        3.    Follow-up Information     Follow up With Specialties Details Why Contact Info    Velia Arredondo MD Family Practice Schedule an appointment as soon as possible for a visit in 2 days  [phone] [facility]      Lorrie Gowers, MD Orthopedic Surgery Schedule an appointment as soon as possible for a visit in 2 days  [id number]  [phone]      [facility] EMERGENCY DEPT Emergency Medicine  As needed if symptoms worsen [address] [address]  [phone]        ____________________________________     Please note that this dictation was completed with "[employer] the computer voice recognition software. Quite often unanticipated grammatical, syntax, homophones, and other interpretive errors are inadvertently transcribed by the computer software. Please disregard these errors. Please excuse any errors that have escaped final proofreading.

## 2020-06-10 NOTE — ED TRIAGE NOTES
Patient reports increase in the number of falls over the last two weeks. States that yesterday morning had leg weakness and confusion when she woke up and is coming in today to be checked for that.

## 2022-08-03 ENCOUNTER — HOSPITAL ENCOUNTER (EMERGENCY)
Age: 67
Discharge: HOME | End: 2022-08-03
Attending: STUDENT IN AN ORGANIZED HEALTH CARE EDUCATION/TRAINING PROGRAM
Payer: MEDICARE

## 2022-08-03 ENCOUNTER — APPOINTMENT (OUTPATIENT)
Dept: GENERAL RADIOLOGY | Age: 67
End: 2022-08-03
Attending: STUDENT IN AN ORGANIZED HEALTH CARE EDUCATION/TRAINING PROGRAM
Payer: MEDICARE

## 2022-08-03 ENCOUNTER — APPOINTMENT (OUTPATIENT)
Dept: CT IMAGING | Age: 67
End: 2022-08-03
Attending: STUDENT IN AN ORGANIZED HEALTH CARE EDUCATION/TRAINING PROGRAM
Payer: MEDICARE

## 2022-08-03 VITALS
DIASTOLIC BLOOD PRESSURE: 84 MMHG | OXYGEN SATURATION: 98 % | BODY MASS INDEX: 37.76 KG/M2 | HEIGHT: 61 IN | SYSTOLIC BLOOD PRESSURE: 160 MMHG | RESPIRATION RATE: 18 BRPM | TEMPERATURE: 97.9 F | HEART RATE: 88 BPM | WEIGHT: 200 LBS

## 2022-08-03 DIAGNOSIS — R11.2 NAUSEA AND VOMITING, UNSPECIFIED VOMITING TYPE: Primary | ICD-10-CM

## 2022-08-03 DIAGNOSIS — R51.9 NONINTRACTABLE HEADACHE, UNSPECIFIED CHRONICITY PATTERN, UNSPECIFIED HEADACHE TYPE: ICD-10-CM

## 2022-08-03 LAB
ANION GAP SERPL CALC-SCNC: 7 MMOL/L (ref 3–18)
APPEARANCE UR: CLEAR
ATRIAL RATE: 82 BPM
BASOPHILS # BLD: 0 K/UL (ref 0–0.1)
BASOPHILS NFR BLD: 0 % (ref 0–2)
BILIRUB UR QL: NEGATIVE
BNP SERPL-MCNC: 287 PG/ML (ref 0–900)
BUN SERPL-MCNC: 9 MG/DL (ref 7–18)
BUN/CREAT SERPL: 12 (ref 12–20)
CALCIUM SERPL-MCNC: 9.4 MG/DL (ref 8.5–10.1)
CALCULATED P AXIS, ECG09: 58 DEGREES
CALCULATED R AXIS, ECG10: -33 DEGREES
CALCULATED T AXIS, ECG11: 52 DEGREES
CHLORIDE SERPL-SCNC: 105 MMOL/L (ref 100–111)
CO2 SERPL-SCNC: 29 MMOL/L (ref 21–32)
COLOR UR: YELLOW
CREAT SERPL-MCNC: 0.75 MG/DL (ref 0.6–1.3)
DIAGNOSIS, 93000: NORMAL
DIFFERENTIAL METHOD BLD: ABNORMAL
EOSINOPHIL # BLD: 0.2 K/UL (ref 0–0.4)
EOSINOPHIL NFR BLD: 3 % (ref 0–5)
ERYTHROCYTE [DISTWIDTH] IN BLOOD BY AUTOMATED COUNT: 14.8 % (ref 11.6–14.5)
GLUCOSE SERPL-MCNC: 121 MG/DL (ref 74–99)
GLUCOSE UR STRIP.AUTO-MCNC: NEGATIVE MG/DL
HCT VFR BLD AUTO: 45.1 % (ref 35–45)
HGB BLD-MCNC: 14.8 G/DL (ref 12–16)
HGB UR QL STRIP: NEGATIVE
IMM GRANULOCYTES # BLD AUTO: 0 K/UL (ref 0–0.04)
IMM GRANULOCYTES NFR BLD AUTO: 0 % (ref 0–0.5)
KETONES UR QL STRIP.AUTO: NEGATIVE MG/DL
LEUKOCYTE ESTERASE UR QL STRIP.AUTO: NEGATIVE
LYMPHOCYTES # BLD: 1.4 K/UL (ref 0.9–3.6)
LYMPHOCYTES NFR BLD: 24 % (ref 21–52)
MCH RBC QN AUTO: 29.4 PG (ref 24–34)
MCHC RBC AUTO-ENTMCNC: 32.8 G/DL (ref 31–37)
MCV RBC AUTO: 89.5 FL (ref 78–100)
MONOCYTES # BLD: 0.4 K/UL (ref 0.05–1.2)
MONOCYTES NFR BLD: 7 % (ref 3–10)
NEUTS SEG # BLD: 3.8 K/UL (ref 1.8–8)
NEUTS SEG NFR BLD: 65 % (ref 40–73)
NITRITE UR QL STRIP.AUTO: NEGATIVE
NRBC # BLD: 0 K/UL (ref 0–0.01)
NRBC BLD-RTO: 0 PER 100 WBC
P-R INTERVAL, ECG05: 172 MS
PH UR STRIP: 8.5 [PH] (ref 5–8)
PLATELET # BLD AUTO: 252 K/UL (ref 135–420)
PMV BLD AUTO: 9.6 FL (ref 9.2–11.8)
POTASSIUM SERPL-SCNC: 4.1 MMOL/L (ref 3.5–5.5)
PROT UR STRIP-MCNC: NEGATIVE MG/DL
Q-T INTERVAL, ECG07: 410 MS
QRS DURATION, ECG06: 88 MS
QTC CALCULATION (BEZET), ECG08: 479 MS
RBC # BLD AUTO: 5.04 M/UL (ref 4.2–5.3)
SODIUM SERPL-SCNC: 141 MMOL/L (ref 136–145)
SP GR UR REFRACTOMETRY: 1.01 (ref 1–1.03)
TROPONIN-HIGH SENSITIVITY: 6 NG/L (ref 0–54)
UROBILINOGEN UR QL STRIP.AUTO: 0.2 EU/DL (ref 0.2–1)
VENTRICULAR RATE, ECG03: 82 BPM
WBC # BLD AUTO: 5.8 K/UL (ref 4.6–13.2)

## 2022-08-03 PROCEDURE — 96374 THER/PROPH/DIAG INJ IV PUSH: CPT

## 2022-08-03 PROCEDURE — 83880 ASSAY OF NATRIURETIC PEPTIDE: CPT

## 2022-08-03 PROCEDURE — 93005 ELECTROCARDIOGRAM TRACING: CPT

## 2022-08-03 PROCEDURE — 85025 COMPLETE CBC W/AUTO DIFF WBC: CPT

## 2022-08-03 PROCEDURE — 71045 X-RAY EXAM CHEST 1 VIEW: CPT

## 2022-08-03 PROCEDURE — 80048 BASIC METABOLIC PNL TOTAL CA: CPT

## 2022-08-03 PROCEDURE — 74011250637 HC RX REV CODE- 250/637: Performed by: STUDENT IN AN ORGANIZED HEALTH CARE EDUCATION/TRAINING PROGRAM

## 2022-08-03 PROCEDURE — 70450 CT HEAD/BRAIN W/O DYE: CPT

## 2022-08-03 PROCEDURE — 74011250636 HC RX REV CODE- 250/636: Performed by: STUDENT IN AN ORGANIZED HEALTH CARE EDUCATION/TRAINING PROGRAM

## 2022-08-03 PROCEDURE — 84484 ASSAY OF TROPONIN QUANT: CPT

## 2022-08-03 PROCEDURE — 81003 URINALYSIS AUTO W/O SCOPE: CPT

## 2022-08-03 PROCEDURE — 99285 EMERGENCY DEPT VISIT HI MDM: CPT

## 2022-08-03 PROCEDURE — 96375 TX/PRO/DX INJ NEW DRUG ADDON: CPT

## 2022-08-03 RX ORDER — TRAMADOL HYDROCHLORIDE 50 MG/1
50 TABLET ORAL
Status: COMPLETED | OUTPATIENT
Start: 2022-08-03 | End: 2022-08-03

## 2022-08-03 RX ORDER — TRAMADOL HYDROCHLORIDE 50 MG/1
50 TABLET ORAL
Qty: 12 TABLET | Refills: 0 | Status: SHIPPED | OUTPATIENT
Start: 2022-08-03 | End: 2022-08-06

## 2022-08-03 RX ORDER — FENTANYL CITRATE 50 UG/ML
25 INJECTION, SOLUTION INTRAMUSCULAR; INTRAVENOUS
Status: COMPLETED | OUTPATIENT
Start: 2022-08-03 | End: 2022-08-03

## 2022-08-03 RX ORDER — TRAMADOL HYDROCHLORIDE 50 MG/1
50 TABLET ORAL
Status: DISCONTINUED | OUTPATIENT
Start: 2022-08-03 | End: 2022-08-03

## 2022-08-03 RX ORDER — ONDANSETRON 4 MG/1
4 TABLET, FILM COATED ORAL
Qty: 12 TABLET | Refills: 0 | Status: SHIPPED | OUTPATIENT
Start: 2022-08-03

## 2022-08-03 RX ORDER — ONDANSETRON 2 MG/ML
4 INJECTION INTRAMUSCULAR; INTRAVENOUS
Status: COMPLETED | OUTPATIENT
Start: 2022-08-03 | End: 2022-08-03

## 2022-08-03 RX ADMIN — ONDANSETRON 4 MG: 2 INJECTION INTRAMUSCULAR; INTRAVENOUS at 11:46

## 2022-08-03 RX ADMIN — FENTANYL CITRATE 25 MCG: 50 INJECTION, SOLUTION INTRAMUSCULAR; INTRAVENOUS at 11:45

## 2022-08-03 RX ADMIN — TRAMADOL HYDROCHLORIDE 50 MG: 50 TABLET, COATED ORAL at 14:25

## 2022-08-03 NOTE — ED PROVIDER NOTES
EMERGENCY DEPARTMENT HISTORY AND PHYSICAL EXAM      Date: 8/3/2022  Patient Name: Nestor Carrera    History of Presenting Illness     Chief Complaint   Patient presents with    Vomiting    Headache    Jaw Pain       HPI:  Nestor Carrera is a 78 y.o. female with a history of PVD, Chronic back pain, sleep apnea, TIA, eczema, HTN, obesity who presents with complaints of vomiting around 1am and then following developed bilateral jaw pain around 2 am, along with a headache. She also has some bilateral neck pain, paraspinal. Not midline, able to bend her chin to her chest w/o difficulty. The headache was not maximal at onset and was not thunderclap-like. No recent sick contacts. On review of systems, the patient denies fever, chills, rashes, trauma, neck stiffness. The patient denies any aggravating or alleviating factors - and has not taken any medications in an attempt to alleviate her symptoms. Headache is bandlike, no sinus symptoms. PCP: Di Espinosa NP    Current Outpatient Medications   Medication Sig Dispense Refill    ondansetron hcl (Zofran) 4 mg tablet Take 1 Tablet by mouth every eight (8) hours as needed for Nausea or Vomiting. 12 Tablet 0    cholecalciferol, VITAMIN D3, (VITAMIN D3) 5,000 unit tab tablet Take  by mouth daily. ergocalciferol (VITAMIN D2) 50,000 unit capsule Take 50,000 Units by mouth. CYANOCOBALAMIN, VITAMIN B-12, (B-12 DOTS PO) Take 1,000 mcg by mouth.      metoprolol (LOPRESSOR) 100 mg tablet Take 100 mg by mouth two (2) times a day. pregabalin (LYRICA) 200 mg capsule Take 200 mg by mouth daily. pantoprazole (PROTONIX) 40 mg tablet Take 40 mg by mouth daily. zolpidem (AMBIEN) 10 mg tablet Take 10 mg by mouth nightly as needed. predniSONE (DELTASONE) 10 mg tablet Take 5 mg by mouth two (2) times a day. valACYclovir (VALTREX) 500 mg tablet Take 500 mg by mouth daily.         valsartan (DIOVAN) 40 mg tablet Take 40 mg by mouth daily. Past History     Past Medical History:  Past Medical History:   Diagnosis Date    Anticoagulated     due to PVD    Arrhythmia     sees Dr. Rush Block - uses Beta Blockers for rate control    Asthma     Chronic back pain     Eczema     GERD (gastroesophageal reflux disease)     Hypercholesterolemia     Hypertension     Insufficiency, adrenal ([facility] [address]     pt states due to multiple steroid injection for back pain and eczema    Pre-diabetes     last HgbA1C (early 2017) was under 7.0    PVD (peripheral vascular disease) (HCC)     hx of carotid disease    Severe obesity (BMI 35.0-39. 9) with comorbidity ([address]     Severe obesity (HCC)     Sleep apnea     does not use a device    TIA (transient ischemic attack)     X 2 related to carotid disease       Past Surgical History:  Past Surgical History:   Procedure Laterality Date    FUSION OF SACROILIAC JOINT      HX APPENDECTOMY      HX BLADDER SUSPENSION      HX CAROTID ENDARTERECTOMY  07/2012    left    HX CERVICAL FUSION      HX HEMORRHOIDECTOMY      HX LAP CHOLECYSTECTOMY      HX LUMBAR FUSION      X 2    HX ORTHOPAEDIC      hammer toe    HX ORTHOPAEDIC      tendon repair foot    HX TOTAL ABDOMINAL HYSTERECTOMY         Family History:  Family History   Problem Relation Age of Onset    Cancer Mother     Cancer Father        Social History:  Social History     Tobacco Use    Smoking status: Never    Smokeless tobacco: Never   Substance Use Topics    Alcohol use: No    Drug use: No       Allergies: Allergies   Allergen Reactions    Contrast Agent [Iodine] Swelling and Cough    Other Medication Other (comments)     PAPER TAPE-BLISTERS       PMH, PSH, family history, social history, allergies reviewed with the patient with significant items noted above. Review of Systems   As per HPI, otherwise reviewed and negative.      Physical Exam     Vitals:    08/03/22 1304 08/03/22 1319 08/03/22 1334 08/03/22 1349   BP:       Pulse: 83 87 80 88   Resp: 17 23 17 18   Temp: SpO2: 95% 95% 95% 98%   Weight:       Height:           Gen: Well-appearing, in no acute distress   HEENT: Normocephalic, sclera anicteric, full range of motion of the neck without issue. Cardiovascular: Normal rate, regular rhythm, no murmurs, rubs, gallops. Pulses intact and equal distally. Pulmonary: No respiratory distress. No stridor. Clear lungs. ABD: Soft, nontender, nondistended. Neuro: Alert. Normal speech. Normal mentation. PERRLA. Cranial nerves II through XII intact. Full strength and sensation throughout. Psych: Normal thought content and thought processes. : No CVA tenderness  EXT: Moves all extremities well. No cyanosis or clubbing. Skin: Warm and well-perfused. Diagnostic Study Results     Labs -     No results found for this or any previous visit (from the past 12 hour(s)). Radiologic Studies -   CT HEAD WO CONT   Final Result      1. No acute intracranial abnormalities are identified. Please note that head CT may be negative in the acute setting and MRI could best   further evaluate for acute/subacute ischemia/infarct in the high/persistent   index of clinical suspicion. XR CHEST PORT   Final Result      1. Streaky right basilar opacities which can reflect areas of atelectasis or   possibly minor infiltrate. Penetration limited at lung bases limiting   sensitivity. 2. Enlargement of cardiomediastinal silhouette, similar to prior. CT Results  (Last 48 hours)      None          CXR Results  (Last 48 hours)      None              Medical Decision Making   I am the first provider for this patient. I reviewed the vital signs, available nursing notes, past medical history, past surgical history, family history and social history. Vital Signs-Reviewed the patient's vital signs. Records Reviewed: Personally, on initial evaluation    MDM:   Patient presents with headache. Exam significant for well appearing female  .    DDX considered: Tension headache, migraine, cluster headache, other headache syndrome  DDX thought to be less likely but also considered due to high risk condition: IIH, subarachnoid hemorrhage, meningitis    Patient condition on initial evaluation: stable    Plan:   Pain Control  Antiemetics  Close Observation    Orders as below:  Orders Placed This Encounter    XR CHEST PORT    CT HEAD WO CONT    CBC WITH AUTOMATED DIFF    BASIC METABOLIC PANEL    PRO-BNP    TROPONIN-HIGH SENSITIVITY    URINALYSIS W/ RFLX MICROSCOPIC    EKG, 12 LEAD, INITIAL    ondansetron (ZOFRAN) injection 4 mg    fentaNYL citrate (PF) injection 25 mcg    DISCONTD: traMADoL (ULTRAM) tablet 50 mg    traMADoL (ULTRAM) tablet 50 mg    ondansetron hcl (Zofran) 4 mg tablet    traMADoL (Ultram) 50 mg tablet          ED Course:   ED Course as of 08/08/22 1721   Wed Aug 03, 2022   1217 CBC without leukocytosis or anemia. No electrolyte abnormality on chemistry panel. Urinalysis w/o signs of infection     [DM]   1428 Feeling much better at this time. [DM]   9290 Patient was not moved quickly. [DM]   8482 Tolerated tramadol, feeling better, okay for home.  [DM]   1502 No acute pathology necessitating further emergent workup or hospital admission is suspected or found. Will discharge home with meds and follow up. She is comfortable with the plan and discharge at this time. Expressed the importance of follow up for current symptoms and she agrees and was advised on what signs/symptoms to return immediately to the ER. [DM]      ED Course User Index  [DM] Brian Corbin MD      Shared decision making with patient. Has contrast allergy, explained process and timeline of Lumbar puncture. She is declining at thist time.            Vitals Review/addressed -     Diagnostic Study Results     Orders Placed This Encounter    XR CHEST PORT     Standing Status:   Standing     Number of Occurrences:   1     Order Specific Question:   Reason for Exam     Answer: vomiting    CT HEAD WO CONT     Standing Status:   Standing     Number of Occurrences:   1     Order Specific Question:   Transport     Answer:   BED [2]     Order Specific Question:   Reason for Exam     Answer:   headache     Order Specific Question:   Decision Support Exception     Answer:   Emergency Medical Condition (MA) [1]    CBC WITH AUTOMATED DIFF     Standing Status:   Standing     Number of Occurrences:   1    BASIC METABOLIC PANEL     Standing Status:   Standing     Number of Occurrences:   1    PRO-BNP     Standing Status:   Standing     Number of Occurrences:   1    TROPONIN-HIGH SENSITIVITY     Standing Status:   Standing     Number of Occurrences:   1    URINALYSIS W/ RFLX MICROSCOPIC     Standing Status:   Standing     Number of Occurrences:   1    EKG, 12 LEAD, INITIAL     Standing Status:   Standing     Number of Occurrences:   1     Order Specific Question:   Reason for Exam:     Answer:   Pain    ondansetron (ZOFRAN) injection 4 mg    fentaNYL citrate (PF) injection 25 mcg    DISCONTD: traMADoL (ULTRAM) tablet 50 mg    traMADoL (ULTRAM) tablet 50 mg    ondansetron hcl (Zofran) 4 mg tablet     Sig: Take 1 Tablet by mouth every eight (8) hours as needed for Nausea or Vomiting. Dispense:  12 Tablet     Refill:  0    traMADoL (Ultram) 50 mg tablet     Sig: Take 1 Tablet by mouth every six (6) hours as needed for Pain for up to 3 days. Max Daily Amount: 200 mg. Dispense:  12 Tablet     Refill:  0       Labs -     No results found for this or any previous visit (from the past 12 hour(s)). Radiologic Studies -   CT HEAD WO CONT   Final Result      1. No acute intracranial abnormalities are identified. Please note that head CT may be negative in the acute setting and MRI could best   further evaluate for acute/subacute ischemia/infarct in the high/persistent   index of clinical suspicion. XR CHEST PORT   Final Result      1.  Streaky right basilar opacities which can reflect areas of atelectasis or   possibly minor infiltrate. Penetration limited at lung bases limiting   sensitivity. 2. Enlargement of cardiomediastinal silhouette, similar to prior. CT Results  (Last 48 hours)      None          CXR Results  (Last 48 hours)      None            Disposition     Disposition:  Home    CLINICAL IMPRESSION:    1. Nausea and vomiting, unspecified vomiting type    2. Nonintractable headache, unspecified chronicity pattern, unspecified headache type        It should be noted that I will be the provider of record for this patient  Janett Schumacher MD    Follow-up Information       Follow up With Specialties Details Why [address]    THE [facility] EMERGENCY DEPT Emergency Medicine Go to  If symptoms worsen [address] [place] [address]  [phone]    Ruthann Gan NP Nurse Practitioner Call in 1 day  [address] [place] [address]  [phone]              Discharge Medication List as of 8/3/2022  3:01 PM        START taking these medications    Details   ondansetron hcl (Zofran) 4 mg tablet Take 1 Tablet by mouth every eight (8) hours as needed for Nausea or Vomiting., Normal, Disp-12 Tablet, R-0      traMADoL (Ultram) 50 mg tablet Take 1 Tablet by mouth every six (6) hours as needed for Pain for up to 3 days. Max Daily Amount: 200 mg., Normal, Disp-12 Tablet, R-0           CONTINUE these medications which have NOT CHANGED    Details   cholecalciferol, VITAMIN D3, (VITAMIN D3) 5,000 unit tab tablet Take  by mouth daily. , Historical Med      ergocalciferol (VITAMIN D2) 50,000 unit capsule Take 50,000 Units by mouth., Historical Med      CYANOCOBALAMIN, VITAMIN B-12, (B-12 DOTS PO) Take 1,000 mcg by mouth., Historical Med      metoprolol (LOPRESSOR) 100 mg tablet Take 100 mg by mouth two (2) times a day. Historical Med, 100 mg      pregabalin (LYRICA) 200 mg capsule Take 200 mg by mouth daily.   Historical Med, 200 mg      pantoprazole (PROTONIX) 40 mg tablet Take 40 mg by mouth daily. Historical Med, 40 mg      zolpidem (AMBIEN) 10 mg tablet Take 10 mg by mouth nightly as needed. Historical Med, 10 mg      predniSONE (DELTASONE) 10 mg tablet Take 5 mg by mouth two (2) times a day., Historical Med      valACYclovir (VALTREX) 500 mg tablet Take 500 mg by mouth daily. Historical Med, 500 mg      valsartan (DIOVAN) 40 mg tablet Take 40 mg by mouth daily. Historical Med, 40 mg           STOP taking these medications       HYDROcodone-acetaminophen (NORCO) 5-325 mg per tablet Comments:   Reason for Stopping:               Please note that this dictation was completed with Fitsistant, the computer voice recognition software. Quite often unanticipated grammatical, syntax, homophones, and other interpretive errors are inadvertently transcribed by the computer software. Please disregard these errors. Please excuse any errors that have escaped final proofreading.

## 2022-08-03 NOTE — DISCHARGE INSTRUCTIONS
Please carefully read all discharge instructions    Please follow-up with a primary care physician and if you do not have one currently use the contact information provided to obtain an appointment. If none was provided please call the number on the back of your insurance card to locate a Primary care doctor. Many offices have \"cancellation lists\" that you can ask to be placed on; should a patient with an earlier appointment cancel you will be notified to take their place. Please return to the Emergency Room immediately if your symptoms worsen. Please return to the Emergency Department if you develop a fever, chills, cannot eat or drink due to nausea or vomiting, or if any of your symptoms worsen. If you do not have insurance you can use the below for your medications. [email or web address]    What are GoodRx coupons? GoodRx coupons will help you pay less than the cash price for your prescription. Aliza Avers free to use and are accepted at virtually every [place] pharmacy. Your pharmacist will know how to enter the codes on the coupon to pull up the lowest discount available. [employer] Activation    Thank you for requesting access to [employer]. Please follow the instructions below to securely access and download your online medical record. [employer] allows you to send messages to your doctor, view your test results, renew your prescriptions, schedule appointments, and more. How Do I Sign Up? In your internet browser, go to [email or web address]  Click on the First Time User? Click Here link in the Sign In box. You will be redirect to the New Member Sign Up page. Enter your [employer] Access Code exactly as it appears below. You will not need to use this code after youve completed the sign-up process. If you do not sign up before the expiration date, you must request a new code.     [employer] Access Code: [email or web address]  Expires: 9/17/2022 10:16 AM    Enter the last four digits of your Social Security Number (xxxx) and Date of Birth (mm/dd/yyyy) as indicated and click Submit. You will be taken to the next sign-up page. Create a [employer] ID. This will be your [employer] login ID and cannot be changed, so think of one that is secure and easy to remember. Create a [employer] password. You can change your password at any time. Enter your Password Reset Question and Answer. This can be used at a later time if you forget your password. Enter your e-mail address. You will receive e-mail notification when new information is available in [address] Click Sign Up. You can now view and download portions of your medical record. Click the [employer] link to download a portable copy of your medical information. Additional Information    If you have questions, please visit the Frequently Asked Questions section of the [employer] website at [email or web address] Remember, [employer] is NOT to be used for urgent needs. For medical emergencies, dial 911.

## 2024-12-29 NOTE — PATIENT INSTRUCTIONS
Goals: 1. Exercise on treadmill or chair or elliptical exercises, 3-4 per week for at least 15 min. Total among activities. 2. Continue new diet plan with two shakes as meals, up to two optional snacks, make sure to have non-starchy vegetables at dinner with protein source too. 3. Start taking multivitamin again- once daily.
There are no Wet Read(s) to document.

## 2025-01-11 ENCOUNTER — HOSPITAL ENCOUNTER (EMERGENCY)
Facility: HOSPITAL | Age: 70
Discharge: HOME | End: 2025-01-11
Attending: EMERGENCY MEDICINE
Payer: MEDICARE

## 2025-01-11 VITALS
DIASTOLIC BLOOD PRESSURE: 85 MMHG | WEIGHT: 218 LBS | BODY MASS INDEX: 41.16 KG/M2 | OXYGEN SATURATION: 99 % | SYSTOLIC BLOOD PRESSURE: 165 MMHG | HEART RATE: 97 BPM | HEIGHT: 61 IN | TEMPERATURE: 98.2 F | RESPIRATION RATE: 18 BRPM

## 2025-01-11 DIAGNOSIS — Z51.89 ENCOUNTER FOR POST-TRAUMATIC WOUND CHECK: Primary | ICD-10-CM

## 2025-01-11 DIAGNOSIS — L03.115 CELLULITIS OF RIGHT LOWER EXTREMITY: ICD-10-CM

## 2025-01-11 PROCEDURE — 99283 EMERGENCY DEPT VISIT LOW MDM: CPT

## 2025-01-11 NOTE — ED NOTES
Patient given discharge papers. Patient understanding of discharge. Patient out of ED via wheelchair.

## 2025-01-11 NOTE — ED PROVIDER NOTES
EMERGENCY DEPARTMENT HISTORY AND PHYSICAL EXAM    4:30 PM EST seen at this time in room 15        Date: 1/11/2025  Patient Name: Lorena Nichole    History of Presenting Illness     Chief Complaint   Patient presents with    Wound Infection         History Provided By: patient    Additional History (Context): Lorena Nichole is a 69 y.o. female presents with referred here numerous past medical problems, sustained a laceration on 1/5/2025 which was repaired, over the anterior surface of her right leg.  She has been on Keflex since the seventh when there was forming erythema and she is concerned about continued erythema and there is now a blister in the middle of the wound.  No fever, no pain.    PCP: Rossana Shelton, DOROTHY - ETHAN    Chief Complaint:   Duration:    Timing:    Location:   Quality:   Severity:   Modifying Factors:   Associated Symptoms:       No current facility-administered medications for this encounter.     Current Outpatient Medications   Medication Sig Dispense Refill    vitamin D3 (CHOLECALCIFEROL) 125 MCG (5000 UT) TABS tablet Take by mouth daily      ergocalciferol (ERGOCALCIFEROL) 1.25 MG (32831 UT) capsule Take 50,000 Units by mouth      metoprolol (LOPRESSOR) 100 MG tablet Take 100 mg by mouth 2 times daily      ondansetron (ZOFRAN) 4 MG tablet Take 4 mg by mouth every 8 hours as needed      pantoprazole (PROTONIX) 40 MG tablet Take 40 mg by mouth daily      predniSONE (DELTASONE) 10 MG tablet Take 5 mg by mouth 2 times daily      pregabalin (LYRICA) 200 MG capsule Take 200 mg by mouth daily.      valACYclovir (VALTREX) 500 MG tablet Take 500 mg by mouth daily      valsartan (DIOVAN) 40 MG tablet Take 40 mg by mouth daily      zolpidem (AMBIEN) 10 MG tablet Take 10 mg by mouth.         Past History     Past Medical History:  Past Medical History:   Diagnosis Date    Anticoagulated     due to PVD    Arrhythmia     sees Dr. Gonzales - uses Beta Blockers for rate control    Asthma     Chronic back  tablet Take by mouth daily      ergocalciferol (ERGOCALCIFEROL) 1.25 MG (74673 UT) capsule Take 50,000 Units by mouth      metoprolol (LOPRESSOR) 100 MG tablet Take 100 mg by mouth 2 times daily      ondansetron (ZOFRAN) 4 MG tablet Take 4 mg by mouth every 8 hours as needed      pantoprazole (PROTONIX) 40 MG tablet Take 40 mg by mouth daily      predniSONE (DELTASONE) 10 MG tablet Take 5 mg by mouth 2 times daily      pregabalin (LYRICA) 200 MG capsule Take 200 mg by mouth daily.      valACYclovir (VALTREX) 500 MG tablet Take 500 mg by mouth daily      valsartan (DIOVAN) 40 MG tablet Take 40 mg by mouth daily      zolpidem (AMBIEN) 10 MG tablet Take 10 mg by mouth.             DISCONTINUED MEDICATIONS:  Current Discharge Medication List          PATIENT REFERRED TO:  Follow Up with:  Rossana Shelton, APRN - NP  [address] [facility] [address] [phone]    In 3 days      _______________________________    Clinical Impression:   1. Encounter for post-traumatic wound check    2. Cellulitis of right lower extremity         Cali Carr MD using Dragon dictation      _______________________________     Cali Carr MD  01/11/25 6790

## 2025-02-10 ENCOUNTER — APPOINTMENT (OUTPATIENT)
Facility: HOSPITAL | Age: 70
DRG: 637 | End: 2025-02-10
Attending: EMERGENCY MEDICINE
Payer: MEDICARE

## 2025-02-10 ENCOUNTER — APPOINTMENT (OUTPATIENT)
Facility: HOSPITAL | Age: 70
DRG: 637 | End: 2025-02-10
Payer: MEDICARE

## 2025-02-10 ENCOUNTER — HOSPITAL ENCOUNTER (INPATIENT)
Facility: HOSPITAL | Age: 70
LOS: 2 days | Discharge: HOME | DRG: 637 | End: 2025-02-12
Attending: EMERGENCY MEDICINE | Admitting: HOSPITALIST
Payer: MEDICARE

## 2025-02-10 DIAGNOSIS — L03.115 CELLULITIS OF RIGHT LOWER EXTREMITY: Primary | ICD-10-CM

## 2025-02-10 PROBLEM — J44.9 COPD (CHRONIC OBSTRUCTIVE PULMONARY DISEASE) (HCC): Status: ACTIVE | Noted: 2025-02-10

## 2025-02-10 PROBLEM — Z86.718 HISTORY OF DVT (DEEP VEIN THROMBOSIS): Status: ACTIVE | Noted: 2025-02-10

## 2025-02-10 PROBLEM — Z86.73 HISTORY OF STROKE: Status: ACTIVE | Noted: 2025-02-10

## 2025-02-10 PROBLEM — I50.32 CHRONIC DIASTOLIC (CONGESTIVE) HEART FAILURE (HCC): Status: ACTIVE | Noted: 2025-02-10

## 2025-02-10 PROBLEM — D84.9 IMMUNOCOMPROMISED: Status: ACTIVE | Noted: 2025-02-10

## 2025-02-10 PROBLEM — Q28.2 CEREBRAL AVM: Status: ACTIVE | Noted: 2025-02-10

## 2025-02-10 PROBLEM — L03.90 CELLULITIS: Status: ACTIVE | Noted: 2025-02-10

## 2025-02-10 PROBLEM — E11.9 DIABETES MELLITUS, TYPE 2 (HCC): Status: ACTIVE | Noted: 2025-02-10

## 2025-02-10 PROBLEM — L97.915 ULCER OF RIGHT LOWER EXTREMITY WITH MUSCLE INVOLVEMENT WITHOUT EVIDENCE OF NECROSIS (HCC): Status: ACTIVE | Noted: 2025-02-10

## 2025-02-10 LAB
ALBUMIN SERPL-MCNC: 2.9 G/DL (ref 3.4–5)
ALBUMIN SERPL-MCNC: 3 G/DL (ref 3.4–5)
ALBUMIN/GLOB SERPL: 0.7 (ref 0.8–1.7)
ALBUMIN/GLOB SERPL: 0.8 (ref 0.8–1.7)
ALP SERPL-CCNC: 92 U/L (ref 45–117)
ALP SERPL-CCNC: 94 U/L (ref 45–117)
ALT SERPL-CCNC: 23 U/L (ref 13–56)
ALT SERPL-CCNC: 23 U/L (ref 13–56)
ANION GAP SERPL CALC-SCNC: 3 MMOL/L (ref 3–18)
ANION GAP SERPL CALC-SCNC: 6 MMOL/L (ref 3–18)
AST SERPL-CCNC: 15 U/L (ref 10–38)
AST SERPL-CCNC: 15 U/L (ref 10–38)
BASOPHILS # BLD: 0.03 K/UL (ref 0–0.1)
BASOPHILS NFR BLD: 0.4 % (ref 0–2)
BILIRUB SERPL-MCNC: 0.3 MG/DL (ref 0.2–1)
BILIRUB SERPL-MCNC: 0.4 MG/DL (ref 0.2–1)
BUN SERPL-MCNC: 7 MG/DL (ref 7–18)
BUN SERPL-MCNC: 8 MG/DL (ref 7–18)
BUN/CREAT SERPL: 12 (ref 12–20)
BUN/CREAT SERPL: 9 (ref 12–20)
CALCIUM SERPL-MCNC: 8.3 MG/DL (ref 8.5–10.1)
CALCIUM SERPL-MCNC: 9 MG/DL (ref 8.5–10.1)
CHLORIDE SERPL-SCNC: 103 MMOL/L (ref 100–111)
CHLORIDE SERPL-SCNC: 109 MMOL/L (ref 100–111)
CO2 SERPL-SCNC: 28 MMOL/L (ref 21–32)
CO2 SERPL-SCNC: 28 MMOL/L (ref 21–32)
CREAT SERPL-MCNC: 0.6 MG/DL (ref 0.6–1.3)
CREAT SERPL-MCNC: 0.85 MG/DL (ref 0.6–1.3)
CRP SERPL-MCNC: 2.6 MG/DL (ref 0–0.3)
DIFFERENTIAL METHOD BLD: ABNORMAL
ECHO BSA: 2.07 M2
EOSINOPHIL # BLD: 0.26 K/UL (ref 0–0.4)
EOSINOPHIL NFR BLD: 3.7 % (ref 0–5)
ERYTHROCYTE [DISTWIDTH] IN BLOOD BY AUTOMATED COUNT: 13.3 % (ref 11.6–14.5)
ERYTHROCYTE [SEDIMENTATION RATE] IN BLOOD: 57 MM/HR (ref 0–30)
GLOBULIN SER CALC-MCNC: 3.8 G/DL (ref 2–4)
GLOBULIN SER CALC-MCNC: 4.1 G/DL (ref 2–4)
GLUCOSE BLD STRIP.AUTO-MCNC: 141 MG/DL (ref 70–110)
GLUCOSE BLD STRIP.AUTO-MCNC: 269 MG/DL (ref 70–110)
GLUCOSE SERPL-MCNC: 149 MG/DL (ref 74–99)
GLUCOSE SERPL-MCNC: 265 MG/DL (ref 74–99)
HCT VFR BLD AUTO: 38.3 % (ref 35–45)
HGB BLD-MCNC: 12.6 G/DL (ref 12–16)
IMM GRANULOCYTES # BLD AUTO: 0.04 K/UL (ref 0–0.04)
IMM GRANULOCYTES NFR BLD AUTO: 0.6 % (ref 0–0.5)
LACTATE BLD-SCNC: 2.76 MMOL/L (ref 0.4–2)
LYMPHOCYTES # BLD: 2.17 K/UL (ref 0.9–3.3)
LYMPHOCYTES NFR BLD: 31.3 % (ref 21–52)
MAGNESIUM SERPL-MCNC: 1.9 MG/DL (ref 1.6–2.6)
MCH RBC QN AUTO: 30.4 PG (ref 24–34)
MCHC RBC AUTO-ENTMCNC: 32.9 G/DL (ref 31–37)
MCV RBC AUTO: 92.3 FL (ref 78–100)
MONOCYTES # BLD: 0.67 K/UL (ref 0.05–1.2)
MONOCYTES NFR BLD: 9.7 % (ref 3–10)
NEUTS SEG # BLD: 3.77 K/UL (ref 1.8–8)
NEUTS SEG NFR BLD: 54.3 % (ref 40–73)
NRBC # BLD: 0 K/UL (ref 0–0.01)
NRBC BLD-RTO: 0 PER 100 WBC
PLATELET # BLD AUTO: 322 K/UL (ref 135–420)
PMV BLD AUTO: 9.6 FL (ref 9.2–11.8)
POTASSIUM SERPL-SCNC: 3.1 MMOL/L (ref 3.5–5.5)
POTASSIUM SERPL-SCNC: 3.2 MMOL/L (ref 3.5–5.5)
PROT SERPL-MCNC: 6.7 G/DL (ref 6.4–8.2)
PROT SERPL-MCNC: 7.1 G/DL (ref 6.4–8.2)
RBC # BLD AUTO: 4.15 M/UL (ref 4.2–5.3)
SODIUM SERPL-SCNC: 137 MMOL/L (ref 136–145)
SODIUM SERPL-SCNC: 140 MMOL/L (ref 136–145)
WBC # BLD AUTO: 6.9 K/UL (ref 4.6–13.2)

## 2025-02-10 PROCEDURE — 86140 C-REACTIVE PROTEIN: CPT

## 2025-02-10 PROCEDURE — 6360000002 HC RX W HCPCS: Performed by: EMERGENCY MEDICINE

## 2025-02-10 PROCEDURE — 83735 ASSAY OF MAGNESIUM: CPT

## 2025-02-10 PROCEDURE — 82962 GLUCOSE BLOOD TEST: CPT

## 2025-02-10 PROCEDURE — 87077 CULTURE AEROBIC IDENTIFY: CPT

## 2025-02-10 PROCEDURE — 83605 ASSAY OF LACTIC ACID: CPT

## 2025-02-10 PROCEDURE — 73590 X-RAY EXAM OF LOWER LEG: CPT

## 2025-02-10 PROCEDURE — 85025 COMPLETE CBC W/AUTO DIFF WBC: CPT

## 2025-02-10 PROCEDURE — 6370000000 HC RX 637 (ALT 250 FOR IP): Performed by: HOSPITALIST

## 2025-02-10 PROCEDURE — 2500000003 HC RX 250 WO HCPCS: Performed by: HOSPITALIST

## 2025-02-10 PROCEDURE — 96365 THER/PROPH/DIAG IV INF INIT: CPT

## 2025-02-10 PROCEDURE — 99285 EMERGENCY DEPT VISIT HI MDM: CPT

## 2025-02-10 PROCEDURE — 87147 CULTURE TYPE IMMUNOLOGIC: CPT

## 2025-02-10 PROCEDURE — 2580000003 HC RX 258: Performed by: INTERNAL MEDICINE

## 2025-02-10 PROCEDURE — 93971 EXTREMITY STUDY: CPT

## 2025-02-10 PROCEDURE — 94664 DEMO&/EVAL PT USE INHALER: CPT

## 2025-02-10 PROCEDURE — 87070 CULTURE OTHR SPECIMN AEROBIC: CPT

## 2025-02-10 PROCEDURE — 2580000003 HC RX 258: Performed by: HOSPITALIST

## 2025-02-10 PROCEDURE — 87040 BLOOD CULTURE FOR BACTERIA: CPT

## 2025-02-10 PROCEDURE — 87205 SMEAR GRAM STAIN: CPT

## 2025-02-10 PROCEDURE — 80053 COMPREHEN METABOLIC PANEL: CPT

## 2025-02-10 PROCEDURE — 6360000002 HC RX W HCPCS: Performed by: INTERNAL MEDICINE

## 2025-02-10 PROCEDURE — 6370000000 HC RX 637 (ALT 250 FOR IP): Performed by: EMERGENCY MEDICINE

## 2025-02-10 PROCEDURE — 6360000002 HC RX W HCPCS: Performed by: HOSPITALIST

## 2025-02-10 PROCEDURE — 36415 COLL VENOUS BLD VENIPUNCTURE: CPT

## 2025-02-10 PROCEDURE — 94640 AIRWAY INHALATION TREATMENT: CPT

## 2025-02-10 PROCEDURE — 87186 SC STD MICRODIL/AGAR DIL: CPT

## 2025-02-10 PROCEDURE — 2580000003 HC RX 258: Performed by: EMERGENCY MEDICINE

## 2025-02-10 PROCEDURE — 1100000000 HC RM PRIVATE

## 2025-02-10 PROCEDURE — 85652 RBC SED RATE AUTOMATED: CPT

## 2025-02-10 RX ORDER — ONDANSETRON 2 MG/ML
4 INJECTION INTRAMUSCULAR; INTRAVENOUS EVERY 6 HOURS PRN
Status: DISCONTINUED | OUTPATIENT
Start: 2025-02-10 | End: 2025-02-12 | Stop reason: HOSPADM

## 2025-02-10 RX ORDER — FUROSEMIDE 40 MG/1
40 TABLET ORAL DAILY
COMMUNITY
Start: 2024-09-18

## 2025-02-10 RX ORDER — TOLTERODINE 4 MG/1
4 CAPSULE, EXTENDED RELEASE ORAL DAILY
COMMUNITY
Start: 2024-12-17

## 2025-02-10 RX ORDER — ATORVASTATIN CALCIUM 10 MG/1
10 TABLET, FILM COATED ORAL EVERY EVENING
COMMUNITY

## 2025-02-10 RX ORDER — ACETAMINOPHEN 650 MG/1
650 SUPPOSITORY RECTAL EVERY 6 HOURS PRN
Status: DISCONTINUED | OUTPATIENT
Start: 2025-02-10 | End: 2025-02-12 | Stop reason: HOSPADM

## 2025-02-10 RX ORDER — INSULIN GLARGINE 100 [IU]/ML
0.15 INJECTION, SOLUTION SUBCUTANEOUS DAILY
Status: DISCONTINUED | OUTPATIENT
Start: 2025-02-10 | End: 2025-02-11

## 2025-02-10 RX ORDER — METOPROLOL TARTRATE 50 MG
100 TABLET ORAL 2 TIMES DAILY
Status: DISCONTINUED | OUTPATIENT
Start: 2025-02-10 | End: 2025-02-12 | Stop reason: HOSPADM

## 2025-02-10 RX ORDER — POLYETHYLENE GLYCOL 3350 17 G/17G
17 POWDER, FOR SOLUTION ORAL DAILY PRN
Status: DISCONTINUED | OUTPATIENT
Start: 2025-02-10 | End: 2025-02-12 | Stop reason: HOSPADM

## 2025-02-10 RX ORDER — ONDANSETRON 4 MG/1
4 TABLET, ORALLY DISINTEGRATING ORAL EVERY 8 HOURS PRN
Status: DISCONTINUED | OUTPATIENT
Start: 2025-02-10 | End: 2025-02-12 | Stop reason: HOSPADM

## 2025-02-10 RX ORDER — NORTRIPTYLINE HYDROCHLORIDE 25 MG/1
75 CAPSULE ORAL NIGHTLY
COMMUNITY
Start: 2024-12-23 | End: 2025-04-22

## 2025-02-10 RX ORDER — MAGNESIUM SULFATE IN WATER 40 MG/ML
2000 INJECTION, SOLUTION INTRAVENOUS PRN
Status: DISCONTINUED | OUTPATIENT
Start: 2025-02-10 | End: 2025-02-12 | Stop reason: HOSPADM

## 2025-02-10 RX ORDER — INSULIN LISPRO 100 [IU]/ML
0-8 INJECTION, SOLUTION INTRAVENOUS; SUBCUTANEOUS
Status: DISCONTINUED | OUTPATIENT
Start: 2025-02-10 | End: 2025-02-12 | Stop reason: HOSPADM

## 2025-02-10 RX ORDER — BLOOD SUGAR DIAGNOSTIC
1 STRIP MISCELLANEOUS 2 TIMES DAILY
Status: ON HOLD | COMMUNITY
Start: 2024-09-25 | End: 2025-02-12 | Stop reason: HOSPADM

## 2025-02-10 RX ORDER — ENOXAPARIN SODIUM 100 MG/ML
40 INJECTION SUBCUTANEOUS DAILY
Status: DISCONTINUED | OUTPATIENT
Start: 2025-02-10 | End: 2025-02-10

## 2025-02-10 RX ORDER — SODIUM CHLORIDE 0.9 % (FLUSH) 0.9 %
5-40 SYRINGE (ML) INJECTION EVERY 12 HOURS SCHEDULED
Status: DISCONTINUED | OUTPATIENT
Start: 2025-02-10 | End: 2025-02-12 | Stop reason: HOSPADM

## 2025-02-10 RX ORDER — ALBUTEROL SULFATE 90 UG/1
2 INHALANT RESPIRATORY (INHALATION) 4 TIMES DAILY
COMMUNITY
Start: 2024-09-25

## 2025-02-10 RX ORDER — PREDNISONE 10 MG/1
5 TABLET ORAL 2 TIMES DAILY
Status: DISCONTINUED | OUTPATIENT
Start: 2025-02-10 | End: 2025-02-10

## 2025-02-10 RX ORDER — ACETAMINOPHEN 325 MG/1
650 TABLET ORAL EVERY 6 HOURS PRN
Status: DISCONTINUED | OUTPATIENT
Start: 2025-02-10 | End: 2025-02-12 | Stop reason: HOSPADM

## 2025-02-10 RX ORDER — PANTOPRAZOLE SODIUM 40 MG/1
40 TABLET, DELAYED RELEASE ORAL DAILY
Status: DISCONTINUED | OUTPATIENT
Start: 2025-02-10 | End: 2025-02-12 | Stop reason: HOSPADM

## 2025-02-10 RX ORDER — GLIPIZIDE 5 MG/1
5 TABLET ORAL DAILY
COMMUNITY
Start: 2024-11-12

## 2025-02-10 RX ORDER — POTASSIUM CHLORIDE 7.45 MG/ML
10 INJECTION INTRAVENOUS PRN
Status: DISCONTINUED | OUTPATIENT
Start: 2025-02-10 | End: 2025-02-12 | Stop reason: HOSPADM

## 2025-02-10 RX ORDER — PREDNISONE 10 MG/1
10 TABLET ORAL 2 TIMES DAILY
Status: DISCONTINUED | OUTPATIENT
Start: 2025-02-10 | End: 2025-02-12 | Stop reason: HOSPADM

## 2025-02-10 RX ORDER — FLUTICASONE FUROATE, UMECLIDINIUM BROMIDE AND VILANTEROL TRIFENATATE 200; 62.5; 25 UG/1; UG/1; UG/1
1 POWDER RESPIRATORY (INHALATION) DAILY
COMMUNITY
Start: 2024-12-23

## 2025-02-10 RX ORDER — FERROUS SULFATE 325(65) MG
325 TABLET ORAL DAILY
COMMUNITY
Start: 2024-05-13

## 2025-02-10 RX ORDER — IPRATROPIUM BROMIDE AND ALBUTEROL SULFATE 2.5; .5 MG/3ML; MG/3ML
1 SOLUTION RESPIRATORY (INHALATION)
Status: DISCONTINUED | OUTPATIENT
Start: 2025-02-10 | End: 2025-02-12 | Stop reason: HOSPADM

## 2025-02-10 RX ORDER — DONEPEZIL HYDROCHLORIDE 10 MG/1
10 TABLET, FILM COATED ORAL DAILY
COMMUNITY
Start: 2025-01-20

## 2025-02-10 RX ORDER — VALSARTAN 40 MG/1
40 TABLET ORAL DAILY
Status: DISCONTINUED | OUTPATIENT
Start: 2025-02-10 | End: 2025-02-10

## 2025-02-10 RX ORDER — TRAMADOL HYDROCHLORIDE 50 MG/1
50 TABLET ORAL 2 TIMES DAILY PRN
Status: ON HOLD | COMMUNITY
Start: 2024-12-17 | End: 2025-02-12 | Stop reason: HOSPADM

## 2025-02-10 RX ORDER — VANCOMYCIN 1.25 G/250ML
1750 INJECTION, SOLUTION INTRAVENOUS ONCE
Status: DISCONTINUED | OUTPATIENT
Start: 2025-02-10 | End: 2025-02-10 | Stop reason: SDUPTHER

## 2025-02-10 RX ORDER — DONEPEZIL HYDROCHLORIDE 5 MG/1
5 TABLET, FILM COATED ORAL NIGHTLY
Status: DISCONTINUED | OUTPATIENT
Start: 2025-02-10 | End: 2025-02-12 | Stop reason: HOSPADM

## 2025-02-10 RX ORDER — DEXTROSE MONOHYDRATE 100 MG/ML
INJECTION, SOLUTION INTRAVENOUS CONTINUOUS PRN
Status: DISCONTINUED | OUTPATIENT
Start: 2025-02-10 | End: 2025-02-12 | Stop reason: HOSPADM

## 2025-02-10 RX ORDER — DILTIAZEM HYDROCHLORIDE 120 MG/1
120 CAPSULE, COATED, EXTENDED RELEASE ORAL NIGHTLY
COMMUNITY
Start: 2024-12-17 | End: 2025-12-17

## 2025-02-10 RX ORDER — SOLIFENACIN SUCCINATE 10 MG/1
10 TABLET, FILM COATED ORAL EVERY EVENING
COMMUNITY

## 2025-02-10 RX ORDER — 0.9 % SODIUM CHLORIDE 0.9 %
1000 INTRAVENOUS SOLUTION INTRAVENOUS ONCE
Status: COMPLETED | OUTPATIENT
Start: 2025-02-10 | End: 2025-02-10

## 2025-02-10 RX ORDER — POTASSIUM CHLORIDE 1500 MG/1
40 TABLET, EXTENDED RELEASE ORAL PRN
Status: DISCONTINUED | OUTPATIENT
Start: 2025-02-10 | End: 2025-02-12 | Stop reason: HOSPADM

## 2025-02-10 RX ORDER — GLUCAGON 1 MG/ML
1 KIT INJECTION PRN
Status: DISCONTINUED | OUTPATIENT
Start: 2025-02-10 | End: 2025-02-12 | Stop reason: HOSPADM

## 2025-02-10 RX ORDER — HYDROXYZINE PAMOATE 100 MG
100 CAPSULE ORAL NIGHTLY
COMMUNITY
Start: 2024-12-17

## 2025-02-10 RX ORDER — DILTIAZEM HYDROCHLORIDE 120 MG/1
120 CAPSULE, COATED, EXTENDED RELEASE ORAL DAILY
Status: DISCONTINUED | OUTPATIENT
Start: 2025-02-10 | End: 2025-02-12 | Stop reason: HOSPADM

## 2025-02-10 RX ORDER — LATANOPROST 50 UG/ML
2 SOLUTION/ DROPS OPHTHALMIC DAILY
Status: ON HOLD | COMMUNITY
Start: 2024-11-26 | End: 2025-02-12 | Stop reason: HOSPADM

## 2025-02-10 RX ORDER — PREGABALIN 100 MG/1
200 CAPSULE ORAL DAILY
Status: DISCONTINUED | OUTPATIENT
Start: 2025-02-10 | End: 2025-02-12 | Stop reason: HOSPADM

## 2025-02-10 RX ADMIN — CEFEPIME 1000 MG: 1 INJECTION, POWDER, FOR SOLUTION INTRAMUSCULAR; INTRAVENOUS at 11:27

## 2025-02-10 RX ADMIN — PREDNISONE 10 MG: 10 TABLET ORAL at 15:45

## 2025-02-10 RX ADMIN — APIXABAN 5 MG: 5 TABLET, FILM COATED ORAL at 15:45

## 2025-02-10 RX ADMIN — DILTIAZEM HYDROCHLORIDE 120 MG: 120 CAPSULE, COATED, EXTENDED RELEASE ORAL at 15:45

## 2025-02-10 RX ADMIN — APIXABAN 5 MG: 5 TABLET, FILM COATED ORAL at 20:45

## 2025-02-10 RX ADMIN — PANTOPRAZOLE SODIUM 40 MG: 40 TABLET, DELAYED RELEASE ORAL at 15:47

## 2025-02-10 RX ADMIN — METOPROLOL TARTRATE 100 MG: 50 TABLET, FILM COATED ORAL at 20:45

## 2025-02-10 RX ADMIN — IPRATROPIUM BROMIDE AND ALBUTEROL SULFATE 1 DOSE: .5; 2.5 SOLUTION RESPIRATORY (INHALATION) at 19:42

## 2025-02-10 RX ADMIN — POTASSIUM BICARBONATE 40 MEQ: 782 TABLET, EFFERVESCENT ORAL at 15:43

## 2025-02-10 RX ADMIN — VANCOMYCIN HYDROCHLORIDE 1750 MG: 10 INJECTION, POWDER, LYOPHILIZED, FOR SOLUTION INTRAVENOUS at 11:29

## 2025-02-10 RX ADMIN — PREDNISONE 10 MG: 10 TABLET ORAL at 20:45

## 2025-02-10 RX ADMIN — ACETAMINOPHEN 650 MG: 325 TABLET ORAL at 15:44

## 2025-02-10 RX ADMIN — CEFEPIME 1000 MG: 1 INJECTION, POWDER, FOR SOLUTION INTRAMUSCULAR; INTRAVENOUS at 21:30

## 2025-02-10 RX ADMIN — PIPERACILLIN AND TAZOBACTAM 3375 MG: 3; .375 INJECTION, POWDER, LYOPHILIZED, FOR SOLUTION INTRAVENOUS at 15:55

## 2025-02-10 RX ADMIN — SODIUM CHLORIDE 1000 ML: 9 INJECTION, SOLUTION INTRAVENOUS at 12:11

## 2025-02-10 RX ADMIN — DONEPEZIL HYDROCHLORIDE 5 MG: 5 TABLET, FILM COATED ORAL at 20:45

## 2025-02-10 RX ADMIN — INSULIN LISPRO 4 UNITS: 100 INJECTION, SOLUTION INTRAVENOUS; SUBCUTANEOUS at 20:59

## 2025-02-10 RX ADMIN — ACETAMINOPHEN 650 MG: 325 TABLET ORAL at 21:40

## 2025-02-10 RX ADMIN — PREGABALIN 200 MG: 100 CAPSULE ORAL at 13:47

## 2025-02-10 RX ADMIN — SODIUM CHLORIDE, PRESERVATIVE FREE 10 ML: 5 INJECTION INTRAVENOUS at 20:54

## 2025-02-10 RX ADMIN — POTASSIUM BICARBONATE 40 MEQ: 782 TABLET, EFFERVESCENT ORAL at 12:50

## 2025-02-10 RX ADMIN — IPRATROPIUM BROMIDE AND ALBUTEROL SULFATE 1 DOSE: .5; 2.5 SOLUTION RESPIRATORY (INHALATION) at 17:01

## 2025-02-10 RX ADMIN — METOPROLOL TARTRATE 100 MG: 50 TABLET, FILM COATED ORAL at 15:43

## 2025-02-10 RX ADMIN — INSULIN GLARGINE 15 UNITS: 100 INJECTION, SOLUTION SUBCUTANEOUS at 15:45

## 2025-02-10 ASSESSMENT — PAIN - FUNCTIONAL ASSESSMENT
PAIN_FUNCTIONAL_ASSESSMENT: 0-10
PAIN_FUNCTIONAL_ASSESSMENT: ACTIVITIES ARE NOT PREVENTED
PAIN_FUNCTIONAL_ASSESSMENT: ACTIVITIES ARE NOT PREVENTED

## 2025-02-10 ASSESSMENT — PAIN DESCRIPTION - PAIN TYPE
TYPE: ACUTE PAIN
TYPE: ACUTE PAIN

## 2025-02-10 ASSESSMENT — PAIN SCALES - GENERAL
PAINLEVEL_OUTOF10: 3
PAINLEVEL_OUTOF10: 3
PAINLEVEL_OUTOF10: 1
PAINLEVEL_OUTOF10: 4
PAINLEVEL_OUTOF10: 4

## 2025-02-10 ASSESSMENT — PAIN DESCRIPTION - LOCATION
LOCATION: LEG

## 2025-02-10 ASSESSMENT — PAIN DESCRIPTION - DESCRIPTORS
DESCRIPTORS: ACHING
DESCRIPTORS: ACHING

## 2025-02-10 ASSESSMENT — PAIN DESCRIPTION - ORIENTATION
ORIENTATION: LEFT
ORIENTATION: RIGHT
ORIENTATION: LEFT

## 2025-02-10 ASSESSMENT — PAIN DESCRIPTION - ONSET
ONSET: ON-GOING
ONSET: ON-GOING

## 2025-02-10 ASSESSMENT — ENCOUNTER SYMPTOMS
ABDOMINAL PAIN: 0
SHORTNESS OF BREATH: 0
NAUSEA: 0
COUGH: 0
VOMITING: 0

## 2025-02-10 ASSESSMENT — PAIN DESCRIPTION - FREQUENCY
FREQUENCY: CONTINUOUS
FREQUENCY: CONTINUOUS

## 2025-02-10 NOTE — ED NOTES
ED TO INPATIENT SBAR HANDOFF     Patient Name: Lorena Nichole   Preferred Name: Lorena  : 1955  69 y.o.             Family/Caregiver Present: no   Code Status Order: Full Code  PO Status:[unfilled]  Telemetry Order:   C-SSRS: Risk of Suicide: No Risk  Sitter no     Restraints:    Sepsis Risk Score      Situation:  Chief Complaint   Patient presents with    Wound Infection     Brief Description of Patient's Condition: Right leg wound infection. Original wound occurred 2025, pt receiving wound care at home, reports the wound care is not great and family has to change pt's bandages multiple times a day due to excessive drainage.  Mental Status: oriented, alert, coherent, logical, thought processes intact, and able to concentrate and follow conversation  Arrived from: Home  Abnormal labs:   Abnormal Labs Reviewed   CBC WITH AUTO DIFFERENTIAL - Abnormal; Notable for the following components:       Result Value    RBC 4.15 (*)     Immature Granulocytes % 0.6 (*)     All other components within normal limits   COMPREHENSIVE METABOLIC PANEL - Abnormal; Notable for the following components:    Potassium 3.1 (*)     Glucose 265 (*)     BUN/Creatinine Ratio 9 (*)     Albumin 3.0 (*)     Globulin 4.1 (*)     Albumin/Globulin Ratio 0.7 (*)     All other components within normal limits   SEDIMENTATION RATE - Abnormal; Notable for the following components:    Sed Rate, Automated 57 (*)     All other components within normal limits   POCT LACTIC ACID (LACTATE) - Abnormal; Notable for the following components:    POC Lactic Acid 2.76 (*)     All other components within normal limits        Background:  Allergies:   Allergies   Allergen Reactions    Iodine Cough and Swelling     History:   Past Medical History:   Diagnosis Date    Anticoagulated     due to PVD    Arrhythmia     sees Dr. Gonzales - uses Beta Blockers for rate control    Asthma     Chronic back pain     Eczema     GERD (gastroesophageal reflux

## 2025-02-10 NOTE — H&P
History & Physical    Patient: Lorena Nichole MRN: [id number]  [facility]: [id number]    YOB: 1955  Age: 69 y.o.  Sex: female      DOA: 2/10/2025    Chief Complaint:   Chief Complaint   Patient presents with    Wound Infection       Active Hospital Problems    Diagnosis Date Noted    Ulcer of right lower extremity with muscle involvement without evidence of necrosis (HCC) [L97.915] 02/10/2025     Priority: High    Cellulitis of leg, right [L03.115] 02/10/2025     Priority: High    Insufficiency, adrenal (HCC) [E27.40]      Priority: High    Diabetes mellitus, type 2 (HCC) [E11.9] 02/10/2025     Priority: Medium    Immunocompromised (HCC) [D84.9] 02/10/2025    History of DVT (deep vein thrombosis) [Z86.718] 02/10/2025    Chronic diastolic (congestive) heart failure (HCC) [I50.32] 02/10/2025    Cerebral AVM [Q28.2] 02/10/2025    History of stroke [Z86.73] 02/10/2025    COPD (chronic obstructive pulmonary disease) (HCC) [J44.9] 02/10/2025    Anticoagulated [Z79.01]     Chronic back pain [M54.9, G89.29]     GERD (gastroesophageal reflux disease) [K21.9]     Hypertension [I10]     Severe obesity (BMI 35.0-39.9) with comorbidity [E66.01]     Sleep apnea [G47.30]           HPI:     Lorena Nichole is a 69 y.o.  female with past medical history significant for adrenal insufficiency chronically on prednisone, diabetes mellitus type 2 on metformin and glipizide, hypertension, sleep apnea, history of a stroke, history of diastolic congestive heart failure, history of COPD, cerebral AVM who presented with right leg swelling, redness and tenderness with an open ulcer.  Patient states that her condition started beginning of January when she fell due to her mobility problem resulting in right leg open wound that need multiple stitches at that time.  The stitches were removed but the condition got worse in the area developed an open ulcer with draining blood and pus.  Patient states that she has been getting pain that

## 2025-02-10 NOTE — CONSULTS
[facility]  (A Division of [facility])                                                           Date of Admission: 2/10/2025       ID Consult for antibiotic management RLE cellulitis in patient with chronic steroid rx      C/C: leg swelling and pain    Current Antimicrobials:    Prior Antimicrobials:  Vanco 2/10 to date  Cefepime/pip tazo 2/10 to date      Allergy to antibiotics: NA       Assessment:     RLE cellulitis- at site of prior trauma from Jan 2025  Cut to right leg in Jan 2025, had stiches placed and home health care in place  Swelling and pian increased in last few days  No associated fever or leucocytosis  No DVT on Venous duplex- 2/10/25  ESR 57,     Adrenal insufficiency- takes PO steroid    Medical History:   Diagnosis Date    Anticoagulated     due to PVD    Arrhythmia     sees Dr. Gonzales - uses Beta Blockers for rate control    Asthma     Chronic back pain     Eczema     GERD (gastroesophageal reflux disease)     Hypercholesterolemia     Hypertension     Insufficiency, adrenal (HCC)     pt states due to multiple steroid injection for back pain and eczema    Pre-diabetes     last HgbA1C (early 2017) was under 7.0    PVD (peripheral vascular disease) (HCC)     hx of carotid disease    Severe obesity     Severe obesity (BMI 35.0-39.9) with comorbidity     Sleep apnea     does not use a device    TIA (transient ischemic attack)     X 2 related to carotid disease       Recommendation -- ID related:     Cont vancomycin- pharmacy to dose  Cont GNR coverage with cefepime- we can defer anaerobic coverage at this time--can DC zosyn  MRSA screen  Leg elevation-- DW nursing  Marked cellulitis border, will follow on progress.   Supportive care and additional treatment per respective team      Diagnosis and treatment plan reviewed ,questions and concerns discussed with patient.     Thank you for involving me in the care of this patient. Please do not hesitate  bicarb-citric acid (EFFER-K) effervescent tablet 40 mEq  40 mEq Oral PRN John Lopez MD   40 mEq at 02/10/25 1543    Or    potassium chloride 10 mEq/100 mL IVPB (Peripheral Line)  10 mEq IntraVENous PRN John Lopez MD        magnesium sulfate 2000 mg in 50 mL IVPB premix  2,000 mg IntraVENous PRN John Lopez MD        ondansetron (ZOFRAN-ODT) disintegrating tablet 4 mg  4 mg Oral Q8H PRN John Lopez MD        Or    ondansetron (ZOFRAN) injection 4 mg  4 mg IntraVENous Q6H PRN John Lopez MD        polyethylene glycol (GLYCOLAX) packet 17 g  17 g Oral Daily PRN John Lopez MD        acetaminophen (TYLENOL) tablet 650 mg  650 mg Oral Q6H PRN John Lopez MD   650 mg at 02/10/25 1544    Or    acetaminophen (TYLENOL) suppository 650 mg  650 mg Rectal Q6H PRN John Lopez MD        insulin glargine (LANTUS) injection vial 15 Units  0.15 Units/kg SubCUTAneous Daily John Lopez MD   15 Units at 02/10/25 1545    insulin lispro (HUMALOG,ADMELOG) injection vial 0-8 Units  0-8 Units SubCUTAneous 4x Daily AC & HS John Lopez MD        apixaban (ELIQUIS) tablet 5 mg  5 mg Oral BID John Lopez MD   5 mg at 02/10/25 1545    dilTIAZem (CARDIZEM CD) extended release capsule 120 mg  120 mg Oral Daily John Lopez MD   120 mg at 02/10/25 1545    ipratropium 0.5 mg-albuterol 2.5 mg (DUONEB) nebulizer solution 1 Dose  1 Dose Inhalation 4x Daily RT John Lopez MD        predniSONE (DELTASONE) tablet 10 mg  10 mg Oral BID John Lopez MD   10 mg at 02/10/25 1545    donepezil (ARICEPT) tablet 5 mg  5 mg Oral Nightly John Lopez MD        piperacillin-tazobactam (ZOSYN) 3,375 mg in sodium chloride 0.9 % 50 mL extended IVPB (mini-bag)  3,375 mg IntraVENous Q6H John Lopez  mL/hr at 02/10/25 1555 3,375 mg at 02/10/25 1555    Vancomycin - Pharmacy to Dose   Other RX Placeholder John Lopez MD        [START ON 2/11/2025] vancomycin (VANCOCIN) 1500 mg in sodium chloride 0.9%

## 2025-02-10 NOTE — CARE COORDINATION
02/10/25 1520   Service Assessment   Patient Orientation Alert and Oriented;Person;Place;Situation;Self   Cognition Alert   History Provided By Patient   Primary Caregiver Self   Accompanied By/Relationship No family present during interview   Support Systems Spouse/Significant Other;Children   Patient's Healthcare Decision Maker is: Legal Next of Kin   PCP Verified by CM Yes  (Rossana Shelton)   Last Visit to PCP Within last 3 months   Prior Functional Level Independent in ADLs/IADLs;Shopping;Assistance with the following:;Housework   Current Functional Level Independent in ADLs/IADLs;Shopping;Assistance with the following:;Housework   Can patient return to prior living arrangement Yes   Ability to make needs known: Good   Family able to assist with home care needs: Yes   Would you like for me to discuss the discharge plan with any other family members/significant others, and if so, who? Yes  (Can discuss with  and son)   Financial Resources Medicare   Community Resources None   Social/Functional History   Lives With Spouse;Son   Type of Home House   Home Layout Two level   Home Equipment Lift chair;Oxygen;Walker - Rolling   Receives Help From Family   Prior Level of Assist for ADLs Independent   Prior Level of Assist for Homemaking Independent   Homemaking Responsibilities Yes   Ambulation Assistance Independent   Prior Level of Assist for Transfers Independent   Active  No   Patient's  Info Son   Mode of Transportation Car   Occupation Retired   Discharge Planning   Type of Residence House   Living Arrangements Spouse/Significant Other;Children   Current Services Prior To Admission Home Care   Potential Assistance Needed Home Care;Skilled Nursing Facility;Outpatient IV   DME Ordered? No   Potential Assistance Purchasing Medications No   Type of Home Care Services OT;PT;Nursing Services   Patient expects to be discharged to: House   Follow Up Appointment: Best Day/Time  Monday AM   One/Two Story  Residence Two story   History of falls? 1   Services At/After Discharge   Transition of Care Consult (CM Consult) Home Health;SNF;Infusion Center   Services At/After Discharge Skilled Nursing Facility (SNF);IV Therapy;Home Health   [place] Resource Information Provided? No   Mode of Transport at Discharge Other (see comment)  (Patient states son can provide transportation home)   Confirm Follow Up Transport Family   Condition of Participation: Discharge Planning   The Plan for Transition of Care is related to the following treatment goals: Plan is discharge home with home health with potential for home infusion vs SNF   The Patient and/or Patient Representative was provided with a Choice of Provider? Patient   The Patient and/Or Patient Representative agree with the Discharge Plan? Yes   Freedom of Choice list was provided with basic dialogue that supports the patient's individualized plan of care/goals, treatment preferences, and shares the quality data associated with the providers?  Yes     This CM met with patient in room. Patient states that prior to admission, she lives with her  who has early dementia, and her son who has come to live with them to help them both out. Patient states that she has a walker for ambulation and a stair lift to get upstairs. Patient reports that she uses oxygen at night and [employer] provides the oxygen. Patient states she uses a glucometer to check her blood sugars. Patient states that she had [facility] prior to admission but would like to switch to another agency. Referrals sent. Plan will be discharge home with home health, with potential for home IV abx vs SNF. This CM will continue to follow.

## 2025-02-10 NOTE — PROGRESS NOTES
[facility]   Pharmacy Pharmacokinetic Monitoring Service - Vancomycin     Lorena Nichole is a 69 y.o. female starting on Vancomycin therapy for SSTI - 7 day tx. Pharmacy consulted by Dr. Lopez for monitoring and adjustment.    Target Concentration: Goal AUC/ANDREA 400-600 mg*hr/L    Additional Antimicrobials: Zosyn    Pertinent Laboratory Values:   Wt Readings from Last 1 Encounters:   02/10/25 99.8 kg (220 lb)     Temp Readings from Last 1 Encounters:   02/10/25 98.4 °F (36.9 °C)     Estimated Creatinine Clearance: 67 mL/min (based on SCr of 0.85 mg/dL).  Recent Labs     02/10/25  1131   CREATININE 0.85   BUN 8   WBC 6.9     Plan:  Dosing recommendations based on Bayesian software  Patient received Vancomycin 1750 mg IV at 11:29 today, will continue with 1500 mg IV q24hrs starting at 11:00  2/11/2025  Anticipated AUC of 553 mg/l.hr and Trough concentration of 14.8 mg/l at steady state  Renal labs as indicated   Pharmacy will continue to monitor patient and adjust therapy as indicated    KANU NICK RPH, BCPS  2/10/2025 1:47 PM   [phone]

## 2025-02-10 NOTE — ED TRIAGE NOTES
Patient ambulatory with a cane c/o wound to the right leg. Patient fell January 6; received 30 stitches. The stitches are now out however patient has large wound with obvious swelling, redness and yellow discharge to it.     Patient has home health nurse however not followed by wound care.     Hx of diabetes.

## 2025-02-10 NOTE — ED PROVIDER NOTES
tablet Take 1 tablet by mouth daily     • glipiZIDE (GLUCOTROL) 5 MG tablet Take 1 tablet by mouth daily     • hydrOXYzine pamoate (VISTARIL) 100 MG capsule Take 1 capsule by mouth nightly     • metFORMIN (GLUCOPHAGE) 1000 MG tablet Take 1 tablet by mouth 2 times daily (with meals)     • solifenacin (VESICARE) 10 MG tablet Take 1 tablet by mouth every evening     • nortriptyline (PAMELOR) 25 MG capsule Take 3 capsules by mouth nightly     • tolterodine (DETROL LA) 4 MG extended release capsule Take 1 capsule by mouth daily     • vitamin D3 (CHOLECALCIFEROL) 125 MCG (5000 UT) TABS tablet Take by mouth daily     • ergocalciferol (ERGOCALCIFEROL) 1.25 MG (82864 UT) capsule Take 1 capsule by mouth     • metoprolol (LOPRESSOR) 100 MG tablet Take 1 tablet by mouth 2 times daily     • pantoprazole (PROTONIX) 40 MG tablet Take 1 tablet by mouth daily     • pregabalin (LYRICA) 200 MG capsule Take 1 capsule by mouth 2 times daily.     • valsartan (DIOVAN) 40 MG tablet Take 1 tablet by mouth daily         Past History     Past Medical History:  Past Medical History:   Diagnosis Date   • Anticoagulated     due to PVD   • Arrhythmia     sees Dr. Gonzales - uses Beta Blockers for rate control   • Asthma    • Chronic back pain    • Eczema    • GERD (gastroesophageal reflux disease)    • Hypercholesterolemia    • Hypertension    • Insufficiency, adrenal     pt states due to multiple steroid injection for back pain and eczema   • Pre-diabetes     last HgbA1C (early 2017) was under 7.0   • PVD (peripheral vascular disease)     hx of carotid disease   • Severe obesity    • Severe obesity (BMI 35.0-39.9) with comorbidity    • Sleep apnea     does not use a device   • TIA (transient ischemic attack)     X 2 related to carotid disease       Past Surgical History:  Past Surgical History:   Procedure Laterality Date   • APPENDECTOMY     • BLADDER SUSPENSION     • CAROTID ENDARTERECTOMY  07/2012    left   • CERVICAL FUSION     •        Radiologic Studies -   Vascular duplex lower extremity venous right         XR TIBIA FIBULA RIGHT (2 VIEWS)   Final Result      1. No evidence of osteomyelitis.      Electronically signed by Kirby Rey            Medical Decision Making   I am the first provider for this patient.    I reviewed the vital signs, available nursing notes, past medical history, past surgical history, family history and social history.    Vital Signs-Reviewed the patient's vital signs.      EKG: n/a      ED Course: Progress Notes, Reevaluation, and Consults:    Provider Notes (Medical Decision Making):     MDM  Number of Diagnoses or Management Options  Diagnosis management comments: Differential diagnosis includes purulent cellulitis, osteomyelitis, necrotizing fasciitis  There is some concern for Pseudomonas versus MRSA infection will get wound cultures  Patient will need to be admitted because the wound looks like it has a high bacterial burden.        ED Course as of 02/10/25 1243   Mon Feb 10, 2025   1144 Lactic acid 2.76.  Will reevaluate after IV fluids [NF]   1230 Right tib-fib x-ray interpreted by me.  Negative for any fracture or dislocation.  Soft tissue swelling. [NF]   1231 Vascular DVT study of the right lower extremity interpreted by me.  No evidence of any occlusion. [NF]   1231 Will call hospitalist for admission for cellulitis with failure of outpatient antibiotics.  Patient is covered with Vanco and cefepime at this time. [NF]      ED Course User Index  [NF] Amrik Ryder MD       Patient was given the following medications:  Medications   vancomycin (VANCOCIN) 1750 mg in sodium chloride 0.9 % 500 mL IVPB (1,750 mg IntraVENous New Bag 2/10/25 1129)   sodium chloride 0.9 % bolus 1,000 mL (1,000 mLs IntraVENous New Bag 2/10/25 1211)   potassium bicarb-citric acid (EFFER-K) effervescent tablet 40 mEq (has no administration in time range)   cefepime (MAXIPIME) 1,000 mg in sodium chloride 0.9 % 50 mL IVPB (mini-bag)

## 2025-02-11 LAB
ALBUMIN SERPL-MCNC: 2.7 G/DL (ref 3.4–5)
ALBUMIN/GLOB SERPL: 0.8 (ref 0.8–1.7)
ALP SERPL-CCNC: 82 U/L (ref 45–117)
ALT SERPL-CCNC: 24 U/L (ref 13–56)
ANION GAP SERPL CALC-SCNC: 5 MMOL/L (ref 3–18)
AST SERPL-CCNC: 14 U/L (ref 10–38)
BASOPHILS # BLD: 0.03 K/UL (ref 0–0.1)
BASOPHILS NFR BLD: 0.4 % (ref 0–2)
BILIRUB SERPL-MCNC: 0.4 MG/DL (ref 0.2–1)
BUN SERPL-MCNC: 9 MG/DL (ref 7–18)
BUN/CREAT SERPL: 13 (ref 12–20)
CALCIUM SERPL-MCNC: 8.6 MG/DL (ref 8.5–10.1)
CHLORIDE SERPL-SCNC: 107 MMOL/L (ref 100–111)
CO2 SERPL-SCNC: 28 MMOL/L (ref 21–32)
CREAT SERPL-MCNC: 0.67 MG/DL (ref 0.6–1.3)
DIFFERENTIAL METHOD BLD: ABNORMAL
EOSINOPHIL # BLD: 0.01 K/UL (ref 0–0.4)
EOSINOPHIL NFR BLD: 0.1 % (ref 0–5)
ERYTHROCYTE [DISTWIDTH] IN BLOOD BY AUTOMATED COUNT: 13.2 % (ref 11.6–14.5)
EST. AVERAGE GLUCOSE BLD GHB EST-MCNC: 157 MG/DL
GLOBULIN SER CALC-MCNC: 3.3 G/DL (ref 2–4)
GLUCOSE BLD STRIP.AUTO-MCNC: 167 MG/DL (ref 70–110)
GLUCOSE BLD STRIP.AUTO-MCNC: 173 MG/DL (ref 70–110)
GLUCOSE BLD STRIP.AUTO-MCNC: 198 MG/DL (ref 70–110)
GLUCOSE BLD STRIP.AUTO-MCNC: 202 MG/DL (ref 70–110)
GLUCOSE SERPL-MCNC: 194 MG/DL (ref 74–99)
HBA1C MFR BLD: 7.1 % (ref 4.2–5.6)
HCT VFR BLD AUTO: 35.4 % (ref 35–45)
HGB BLD-MCNC: 12 G/DL (ref 12–16)
IMM GRANULOCYTES # BLD AUTO: 0.04 K/UL (ref 0–0.04)
IMM GRANULOCYTES NFR BLD AUTO: 0.6 % (ref 0–0.5)
LYMPHOCYTES # BLD: 1.22 K/UL (ref 0.9–3.3)
LYMPHOCYTES NFR BLD: 18 % (ref 21–52)
MCH RBC QN AUTO: 30.5 PG (ref 24–34)
MCHC RBC AUTO-ENTMCNC: 33.9 G/DL (ref 31–37)
MCV RBC AUTO: 89.8 FL (ref 78–100)
MONOCYTES # BLD: 0.29 K/UL (ref 0.05–1.2)
MONOCYTES NFR BLD: 4.3 % (ref 3–10)
NEUTS SEG # BLD: 5.18 K/UL (ref 1.8–8)
NEUTS SEG NFR BLD: 76.6 % (ref 40–73)
NRBC # BLD: 0 K/UL (ref 0–0.01)
NRBC BLD-RTO: 0 PER 100 WBC
PLATELET # BLD AUTO: 300 K/UL (ref 135–420)
PMV BLD AUTO: 9.4 FL (ref 9.2–11.8)
POTASSIUM SERPL-SCNC: 4.4 MMOL/L (ref 3.5–5.5)
PROT SERPL-MCNC: 6 G/DL (ref 6.4–8.2)
RBC # BLD AUTO: 3.94 M/UL (ref 4.2–5.3)
SODIUM SERPL-SCNC: 140 MMOL/L (ref 136–145)
WBC # BLD AUTO: 6.8 K/UL (ref 4.6–13.2)

## 2025-02-11 PROCEDURE — 1100000000 HC RM PRIVATE

## 2025-02-11 PROCEDURE — 6370000000 HC RX 637 (ALT 250 FOR IP): Performed by: HOSPITALIST

## 2025-02-11 PROCEDURE — 6360000002 HC RX W HCPCS: Performed by: HOSPITALIST

## 2025-02-11 PROCEDURE — 85025 COMPLETE CBC W/AUTO DIFF WBC: CPT

## 2025-02-11 PROCEDURE — 97161 PT EVAL LOW COMPLEX 20 MIN: CPT

## 2025-02-11 PROCEDURE — 2500000003 HC RX 250 WO HCPCS: Performed by: HOSPITALIST

## 2025-02-11 PROCEDURE — 97602 WOUND(S) CARE NON-SELECTIVE: CPT

## 2025-02-11 PROCEDURE — 2580000003 HC RX 258: Performed by: HOSPITALIST

## 2025-02-11 PROCEDURE — 83036 HEMOGLOBIN GLYCOSYLATED A1C: CPT

## 2025-02-11 PROCEDURE — 82962 GLUCOSE BLOOD TEST: CPT

## 2025-02-11 PROCEDURE — 80053 COMPREHEN METABOLIC PANEL: CPT

## 2025-02-11 PROCEDURE — 2580000003 HC RX 258: Performed by: INTERNAL MEDICINE

## 2025-02-11 PROCEDURE — 97116 GAIT TRAINING THERAPY: CPT

## 2025-02-11 PROCEDURE — 36415 COLL VENOUS BLD VENIPUNCTURE: CPT

## 2025-02-11 PROCEDURE — 94640 AIRWAY INHALATION TREATMENT: CPT

## 2025-02-11 PROCEDURE — 6360000002 HC RX W HCPCS: Performed by: INTERNAL MEDICINE

## 2025-02-11 RX ORDER — INSULIN GLARGINE 100 [IU]/ML
22 INJECTION, SOLUTION SUBCUTANEOUS DAILY
Status: DISCONTINUED | OUTPATIENT
Start: 2025-02-12 | End: 2025-02-12 | Stop reason: HOSPADM

## 2025-02-11 RX ADMIN — METOPROLOL TARTRATE 100 MG: 50 TABLET, FILM COATED ORAL at 08:13

## 2025-02-11 RX ADMIN — IPRATROPIUM BROMIDE AND ALBUTEROL SULFATE 1 DOSE: .5; 2.5 SOLUTION RESPIRATORY (INHALATION) at 19:20

## 2025-02-11 RX ADMIN — IPRATROPIUM BROMIDE AND ALBUTEROL SULFATE 1 DOSE: .5; 2.5 SOLUTION RESPIRATORY (INHALATION) at 16:11

## 2025-02-11 RX ADMIN — INSULIN GLARGINE 15 UNITS: 100 INJECTION, SOLUTION SUBCUTANEOUS at 08:11

## 2025-02-11 RX ADMIN — CEFEPIME 1000 MG: 1 INJECTION, POWDER, FOR SOLUTION INTRAMUSCULAR; INTRAVENOUS at 05:44

## 2025-02-11 RX ADMIN — CEFEPIME 1000 MG: 1 INJECTION, POWDER, FOR SOLUTION INTRAMUSCULAR; INTRAVENOUS at 21:23

## 2025-02-11 RX ADMIN — PANTOPRAZOLE SODIUM 40 MG: 40 TABLET, DELAYED RELEASE ORAL at 08:12

## 2025-02-11 RX ADMIN — SODIUM CHLORIDE, PRESERVATIVE FREE 10 ML: 5 INJECTION INTRAVENOUS at 08:13

## 2025-02-11 RX ADMIN — METOPROLOL TARTRATE 100 MG: 50 TABLET, FILM COATED ORAL at 21:19

## 2025-02-11 RX ADMIN — DONEPEZIL HYDROCHLORIDE 5 MG: 5 TABLET, FILM COATED ORAL at 21:18

## 2025-02-11 RX ADMIN — PREGABALIN 200 MG: 100 CAPSULE ORAL at 08:13

## 2025-02-11 RX ADMIN — DILTIAZEM HYDROCHLORIDE 120 MG: 120 CAPSULE, COATED, EXTENDED RELEASE ORAL at 08:12

## 2025-02-11 RX ADMIN — ACETAMINOPHEN 650 MG: 325 TABLET ORAL at 23:26

## 2025-02-11 RX ADMIN — IPRATROPIUM BROMIDE AND ALBUTEROL SULFATE 1 DOSE: .5; 2.5 SOLUTION RESPIRATORY (INHALATION) at 12:07

## 2025-02-11 RX ADMIN — APIXABAN 5 MG: 5 TABLET, FILM COATED ORAL at 08:12

## 2025-02-11 RX ADMIN — PREDNISONE 10 MG: 10 TABLET ORAL at 08:12

## 2025-02-11 RX ADMIN — SODIUM CHLORIDE, PRESERVATIVE FREE 10 ML: 5 INJECTION INTRAVENOUS at 21:34

## 2025-02-11 RX ADMIN — APIXABAN 5 MG: 5 TABLET, FILM COATED ORAL at 21:19

## 2025-02-11 RX ADMIN — INSULIN LISPRO 2 UNITS: 100 INJECTION, SOLUTION INTRAVENOUS; SUBCUTANEOUS at 11:31

## 2025-02-11 RX ADMIN — INSULIN LISPRO 2 UNITS: 100 INJECTION, SOLUTION INTRAVENOUS; SUBCUTANEOUS at 17:15

## 2025-02-11 RX ADMIN — PREDNISONE 10 MG: 10 TABLET ORAL at 21:19

## 2025-02-11 RX ADMIN — CEFEPIME 1000 MG: 1 INJECTION, POWDER, FOR SOLUTION INTRAMUSCULAR; INTRAVENOUS at 13:51

## 2025-02-11 RX ADMIN — VANCOMYCIN HYDROCHLORIDE 1500 MG: 10 INJECTION, POWDER, LYOPHILIZED, FOR SOLUTION INTRAVENOUS at 12:01

## 2025-02-11 RX ADMIN — ACETAMINOPHEN 650 MG: 325 TABLET ORAL at 08:19

## 2025-02-11 RX ADMIN — IPRATROPIUM BROMIDE AND ALBUTEROL SULFATE 1 DOSE: .5; 2.5 SOLUTION RESPIRATORY (INHALATION) at 08:31

## 2025-02-11 ASSESSMENT — PAIN SCALES - GENERAL
PAINLEVEL_OUTOF10: 5
PAINLEVEL_OUTOF10: 2
PAINLEVEL_OUTOF10: 1
PAINLEVEL_OUTOF10: 4

## 2025-02-11 ASSESSMENT — PAIN DESCRIPTION - LOCATION
LOCATION: FOOT
LOCATION: LEG
LOCATION: LEG

## 2025-02-11 ASSESSMENT — PAIN DESCRIPTION - ORIENTATION
ORIENTATION: LEFT
ORIENTATION: RIGHT
ORIENTATION: RIGHT

## 2025-02-11 ASSESSMENT — PAIN - FUNCTIONAL ASSESSMENT: PAIN_FUNCTIONAL_ASSESSMENT: ACTIVITIES ARE NOT PREVENTED

## 2025-02-11 ASSESSMENT — PAIN DESCRIPTION - PAIN TYPE: TYPE: ACUTE PAIN

## 2025-02-11 ASSESSMENT — PAIN DESCRIPTION - ONSET: ONSET: ON-GOING

## 2025-02-11 ASSESSMENT — PAIN DESCRIPTION - DESCRIPTORS: DESCRIPTORS: ACHING

## 2025-02-11 ASSESSMENT — PAIN DESCRIPTION - FREQUENCY: FREQUENCY: CONTINUOUS

## 2025-02-11 NOTE — PLAN OF CARE
Problem: Chronic Conditions and Co-morbidities  Goal: Patient's chronic conditions and co-morbidity symptoms are monitored and maintained or improved  2/11/2025 0134 by Dianna Hinkle, RN  Outcome: Progressing  2/10/2025 1621 by Keena Harris RN  Outcome: Progressing     Problem: Pain  Goal: Verbalizes/displays adequate comfort level or baseline comfort level  2/11/2025 0134 by Dianna Hinkle, RN  Outcome: Progressing  Flowsheets (Taken 2/10/2025 2230)  Verbalizes/displays adequate comfort level or baseline comfort level:   Encourage patient to monitor pain and request assistance   Assess pain using appropriate pain scale   Administer analgesics based on type and severity of pain and evaluate response   Implement non-pharmacological measures as appropriate and evaluate response   Notify Licensed Independent Practitioner if interventions unsuccessful or patient reports new pain  2/10/2025 1621 by Keena Harris RN  Outcome: Progressing     Problem: Safety - Adult  Goal: Free from fall injury  2/11/2025 0134 by Dianna Hinkle, RN  Outcome: Progressing  Flowsheets (Taken 2/10/2025 2253)  Free From Fall Injury: Instruct family/caregiver on patient safety  2/10/2025 1621 by Keena Harris RN  Outcome: Progressing     Problem: ABCDS Injury Assessment  Goal: Absence of physical injury  Outcome: Progressing  Flowsheets (Taken 2/10/2025 2253)  Absence of Physical Injury: Implement safety measures based on patient assessment

## 2025-02-11 NOTE — PROGRESS NOTES
22:50 Shift assessment completed. See nsg flow sheet for details.    03:00 Reassessed with 0 changes noted. Ace wrap on RLE remains C/D/I.Resting quietly in bed with eyes closed between cares x for voiding per BR w/o difficulty.    07:20 Bedside and Verbal shift change report given to THI Harris RN(oncoming nurse) by SONIA Hinkle RN (offgoing nurse). Report included the following information Nurse Handoff Report.

## 2025-02-11 NOTE — CONSULTS
IP WOUND CONSULT    Lorena Nichole  MEDICAL RECORD NUMBER:  [id number]  AGE: 69 y.o.   GENDER: female  : 1955  TODAY'S DATE:  2025    GENERAL     [] Follow-up   [x] New Consult    [x] Present on Admission  [] Hospital Acquired    Lorena Nichole is a 69 y.o. female referred by:   [x] Physician  [] Nursing  [] Other:         PAST MEDICAL HISTORY    Past Medical History:   Diagnosis Date    Anticoagulated     due to PVD    Arrhythmia     sees Dr. Gonzales - uses Beta Blockers for rate control    Asthma     Chronic back pain     Eczema     GERD (gastroesophageal reflux disease)     Hypercholesterolemia     Hypertension     Insufficiency, adrenal (HCC)     pt states due to multiple steroid injection for back pain and eczema    Pre-diabetes     last HgbA1C (early ) was under 7.0    PVD (peripheral vascular disease) (HCC)     hx of carotid disease    Severe obesity     Severe obesity (BMI 35.0-39.9) with comorbidity     Sleep apnea     does not use a device    TIA (transient ischemic attack)     X 2 related to carotid disease        PAST SURGICAL HISTORY    Past Surgical History:   Procedure Laterality Date    APPENDECTOMY      BLADDER SUSPENSION      CAROTID ENDARTERECTOMY  2012    left    CERVICAL FUSION      CHOLECYSTECTOMY, LAPAROSCOPIC      HEMORRHOID SURGERY      HYSTERECTOMY, TOTAL ABDOMINAL (CERVIX REMOVED)      LUMBAR FUSION      X 2    ORTHOPEDIC SURGERY      tendon repair foot    ORTHOPEDIC SURGERY      hammer toe       FAMILY HISTORY    Family History   Problem Relation Age of Onset    Cancer Father     Cancer Mother        SOCIAL HISTORY    Social History     Tobacco Use    Smoking status: Never    Smokeless tobacco: Never   Substance Use Topics    Alcohol use: No    Drug use: No       ALLERGIES    Allergies   Allergen Reactions    Iodine Cough and Swelling       MEDICATIONS    No current facility-administered medications on file prior to encounter.     Current Outpatient Medications on

## 2025-02-11 NOTE — PROGRESS NOTES
[facility]   Pharmacy Pharmacokinetic Monitoring Service - Vancomycin    Consulting Provider: Dr. John Lopez   Indication: Skin and Soft Tissue Infection x 7 days  Target Concentration: Goal AUC/ANDREA 400-600 mg*hr/L  Day of Therapy: 2 of 7  Additional Antimicrobials: Cefepime    Pertinent Laboratory Values:   Wt Readings from Last 1 Encounters:   02/10/25 99.8 kg (220 lb)     Temp Readings from Last 1 Encounters:   02/11/25 98.4 °F (36.9 °C) (Oral)     Estimated Creatinine Clearance: 85 mL/min (based on SCr of 0.67 mg/dL).  Recent Labs     02/10/25  1131 02/10/25  1503 02/11/25  0506   CREATININE 0.85 0.60 0.67   BUN 8 7 9   WBC 6.9  --  6.8         Pertinent Cultures:  Culture Date Source Results   2/10 Wound GPC   MRSA Nasal Swab: N/A. Non-respiratory infection.    Recent vancomycin administrations                     vancomycin (VANCOCIN) 1750 mg in sodium chloride 0.9 % 500 mL IVPB (mg) 1,750 mg New Bag 02/10/25 1129                        Plan:  Current dosing regimen is therapeutic  Continue current dose Vancomycin 1,500 mg IV every 24 hours  Estimated AUC/Tr= 453 mg/L.hr and 11 mg/L at Steady State  Repeat vancomycin concentration ordered for 2/12 @ 0600   Pharmacy will continue to monitor patient and adjust therapy as indicated    Thank you for the consult,  ELI CALDWELL RPH  2/11/2025 10:08 AM

## 2025-02-11 NOTE — CARE COORDINATION
CM received message from [facility] requesting that patient call them to cancel their services. This CM met with patient and family in room, patient still wishes to cancel [facility] in favor of another agency. CM gave patient the number to call to cancel and patient verbalized understanding. Home Health choice list given to patient and acceptances reviewed. Patient would like to use [facility] for services. Referral sent and [facility] has accepted. Plan will be for patient to discharge home with [facility] for home care services.

## 2025-02-11 NOTE — PROGRESS NOTES
Physician Progress Note      PATIENT:               ADAMS WALL  CSN #:                  [id number]  :                       1955  ADMIT DATE:       2/10/2025 10:49 AM  DISCH DATE:  RESPONDING  PROVIDER #:        John Lopez MD          QUERY TEXT:    Pt admitted with RLE  cellulitis. Pt noted to have DM   type 2.  If possible,   please document in progress notes and discharge summary the relationship, if   any, between cellulitis and DM.    The medical record reflects the following:  Risk Factors: adrenal insufficiency  Clinical Indicators: glucose  265  on  admit.;  Immunocompromise patient with   nonhealing cellulitis and ulcer of the right leg.  Patient has adrenal   insufficiency and diabetes mellitus.  Treatment:   IV  Zosyn    Thank you  very  much  Options provided:  -- RLE  cellulitis associated with Diabetes  -- RLE  cellulitis unrelated to Diabetes  -- Other - I will add my own diagnosis  -- Disagree - Not applicable / Not valid  -- Disagree - Clinically unable to determine / Unknown  -- Refer to Clinical Documentation Reviewer    PROVIDER RESPONSE TEXT:    RLE cellulitis associated with Diabetes.    Query created by: Pilar Tong on 2/10/2025 3:48 PM      Electronically signed by:  John Lopez MD 2025 9:58 AM

## 2025-02-11 NOTE — PROGRESS NOTES
Physical Therapy Goals:  Initiated 2/11/2025 to be met within 7-10 days.  Short Term Goals  Short Term Goal 1: Patient will perform supine to/from sit with independence.  Short Term Goal 2: Patient will perform sit to/from stand with mod I/RW in prep for ambulation activity.  Short Term Goal 3: Patient will perform ambulation 150 ft with S/mod I/RW for increased functional mobility.  Short Term Goal 4: Patient will perform step nego x1  with S/RW for home re-entry.    PHYSICAL THERAPY EVALUATION    Patient: Lorena Nichole (69 y.o. 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  Home is 2 story with chair lift to 2nd flr; 1 MONO w/o HR. Also has 3 rollators.    ASSESSMENT :  PT introduced self to pt, explained role and pt agreeable to participate with session. Based on the objective data described below, the patient presents with decreased ROM/motor performance BLE's(decr'd more proximally), with open wound anterior mid LL right.  Pt also with minimally decr'd independence in functional mobility with regard to bed mobility and transfers, and gait.  Pt reports use of O2 in home at night and during the day as needed. Patient seen on medical unit, found supine in bed with IV in place and reports 3/10 pain at wound site.   Pt supine to sit EOB with mod I, sit to stand supervision/RW/vc for proper hand placement and able to participate with GT/RW/CGA.  Sho decr'd with reciprocal, short steps.  Balance intact.  Returned to room and back to bed with S/mod I.  Pt left supine with R LE elevated, all needs in reach, lines intact and nurse Keena present to obtain new IV access.   Pt may benefit from IPPT to achieve goals above and from HHPT for safety assessment in view of multiple falls per pt report due to cerebral AVM.

## 2025-02-11 NOTE — PROGRESS NOTES
[facility]  (A Division of [facility])                                                           Date of Admission: 2/10/2025       ID Consult for antibiotic management RLE cellulitis in patient with chronic steroid rx      C/C: leg swelling and pain    Current Antimicrobials:    Prior Antimicrobials:  Vanco 2/10 to date  Cefepime/pip tazo 2/10 to date      Allergy to antibiotics: NA       Assessment:     RLE cellulitis- at site of prior trauma from Jan 2025. Suspect strep infection  Cut to right leg in Jan 2025, had stiches placed and home health care in place  Swelling and pian increased in last few days  No associated fever or leucocytosis  No DVT on Venous duplex- 2/10/25    Elevated markers-ESR 57 , CRP 2.6    Adrenal insufficiency- takes PO steroid    Micro:  2/10-blood culture- [facility]  2/10- right leg wound culture- 3+ GPR, in progress    Medical History:   Diagnosis Date    Anticoagulated     due to PVD    Arrhythmia     sees Dr. Gonzales - uses Beta Blockers for rate control    Asthma     Chronic back pain     Eczema     GERD (gastroesophageal reflux disease)     Hypercholesterolemia     Hypertension     Insufficiency, adrenal (HCC)     pt states due to multiple steroid injection for back pain and eczema    Pre-diabetes     last HgbA1C (early 2017) was under 7.0    PVD (peripheral vascular disease) (HCC)     hx of carotid disease    Severe obesity     Severe obesity (BMI 35.0-39.9) with comorbidity     Sleep apnea     does not use a device    TIA (transient ischemic attack)     X 2 related to carotid disease       Recommendation -- ID related:     Eryspelas like erythema improved back to wound border, seems like strep cellulitis    DC  vancomycin  Cont cefepime IV  FU blood culture/wound culture- 2/10  Leg elevation-- DW nursing  Supportive care and additional treatment per respective team     Anticipate PO abx for DC tomorrow, if continues current

## 2025-02-11 NOTE — PROGRESS NOTES
Hospitalist Progress Note    Patient: Lorena Nichole MRN: [id number]  CSN: [id number]    YOB: 1955  Age: 69 y.o.  Sex: female    DOA: 2/10/2025 LOS:  LOS: 1 day         Total duration of encounter: 1 day      Chief Complaint ; 69 y.o.  female with past medical history significant for adrenal insufficiency chronically on prednisone, diabetes mellitus type 2 on metformin and glipizide, hypertension, sleep apnea, history of a stroke, history of diastolic congestive heart failure, history of COPD, cerebral AVM who presented with right leg swelling, redness and tenderness with an open ulcer.  Patient states that her condition started beginning of January when she fell due to her mobility problem resulting in right leg open wound that need multiple stitches at that time.  The stitches were removed but the condition got worse in the area developed an open ulcer with draining blood and pus.  Patient states that she has been getting pain that she described as aching-like in the right leg for the last 2 weeks, moderate to severe, aggravated with ambulation, relieved with pain medication.  Condition associated weakness, and fatigue.  She denied any fever or chills.  Patient used oral antibiotic at home but that did not help her.    Subjective ; feeling okay today, some pain in the right leg but improved from yesterday.    Review of systems:  Constitutional: denies fevers, chills, myalgias  Respiratory: denies SOB, cough  Cardiovascular: denies chest pain, palpitations  Gastrointestinal: denies nausea, vomiting, diarrhea    10 systems reviewed, all negative other than what is mentioned above.      Vital signs/Intake and Output:  Visit Vitals  /63   Pulse 57   Temp 98.4 °F (36.9 °C) (Oral)   Resp 18   Ht 1.549 m (5' 1\")   Wt 99.8 kg (220 lb)   SpO2 98%   BMI 41.57 kg/m²     Current Shift:  No intake/output data recorded.  Last three shifts:  02/09 1901 - 02/11 0700  In: 900 [P.O.:900]  Out: -  health care professionals as needed  Documenting clinical information in the electronic or other health record  Independently interpreting results (not reported separately) and communicating results to the patient/family/caregiver  Care coordination and discharge planning with Case Management.      Dear patient, if you are reviewing this note and have a question regarding the medical terminology, please bring it with you to your next PCP visit.  Medical notes are meant to be a communication between medical professionals.  Additionally, portion of this note were created using voice recognition function, syntax and phonetic over may have escaped proofreading.    John Lopez MD

## 2025-02-12 VITALS
DIASTOLIC BLOOD PRESSURE: 61 MMHG | RESPIRATION RATE: 18 BRPM | HEIGHT: 61 IN | SYSTOLIC BLOOD PRESSURE: 101 MMHG | OXYGEN SATURATION: 96 % | HEART RATE: 71 BPM | WEIGHT: 220 LBS | BODY MASS INDEX: 41.54 KG/M2 | TEMPERATURE: 98.6 F

## 2025-02-12 LAB
ALBUMIN SERPL-MCNC: 2.6 G/DL (ref 3.4–5)
ALBUMIN/GLOB SERPL: 0.7 (ref 0.8–1.7)
ALP SERPL-CCNC: 79 U/L (ref 45–117)
ALT SERPL-CCNC: 22 U/L (ref 13–56)
ANION GAP SERPL CALC-SCNC: 4 MMOL/L (ref 3–18)
AST SERPL-CCNC: 13 U/L (ref 10–38)
BASOPHILS # BLD: 0.04 K/UL (ref 0–0.1)
BASOPHILS NFR BLD: 0.6 % (ref 0–2)
BILIRUB SERPL-MCNC: 0.3 MG/DL (ref 0.2–1)
BUN SERPL-MCNC: 14 MG/DL (ref 7–18)
BUN/CREAT SERPL: 20 (ref 12–20)
CALCIUM SERPL-MCNC: 8.5 MG/DL (ref 8.5–10.1)
CHLORIDE SERPL-SCNC: 108 MMOL/L (ref 100–111)
CO2 SERPL-SCNC: 27 MMOL/L (ref 21–32)
CREAT SERPL-MCNC: 0.69 MG/DL (ref 0.6–1.3)
DIFFERENTIAL METHOD BLD: ABNORMAL
EOSINOPHIL # BLD: 0.08 K/UL (ref 0–0.4)
EOSINOPHIL NFR BLD: 1.2 % (ref 0–5)
ERYTHROCYTE [DISTWIDTH] IN BLOOD BY AUTOMATED COUNT: 13.3 % (ref 11.6–14.5)
GLOBULIN SER CALC-MCNC: 3.5 G/DL (ref 2–4)
GLUCOSE BLD STRIP.AUTO-MCNC: 180 MG/DL (ref 70–110)
GLUCOSE BLD STRIP.AUTO-MCNC: 199 MG/DL (ref 70–110)
GLUCOSE SERPL-MCNC: 195 MG/DL (ref 74–99)
HCT VFR BLD AUTO: 36.2 % (ref 35–45)
HGB BLD-MCNC: 12 G/DL (ref 12–16)
IMM GRANULOCYTES # BLD AUTO: 0.05 K/UL (ref 0–0.04)
IMM GRANULOCYTES NFR BLD AUTO: 0.7 % (ref 0–0.5)
LYMPHOCYTES # BLD: 1.42 K/UL (ref 0.9–3.3)
LYMPHOCYTES NFR BLD: 20.5 % (ref 21–52)
MCH RBC QN AUTO: 30.5 PG (ref 24–34)
MCHC RBC AUTO-ENTMCNC: 33.1 G/DL (ref 31–37)
MCV RBC AUTO: 92.1 FL (ref 78–100)
MONOCYTES # BLD: 0.48 K/UL (ref 0.05–1.2)
MONOCYTES NFR BLD: 6.9 % (ref 3–10)
NEUTS SEG # BLD: 4.84 K/UL (ref 1.8–8)
NEUTS SEG NFR BLD: 70.1 % (ref 40–73)
NRBC # BLD: 0 K/UL (ref 0–0.01)
NRBC BLD-RTO: 0 PER 100 WBC
PLATELET # BLD AUTO: 264 K/UL (ref 135–420)
PMV BLD AUTO: 10.5 FL (ref 9.2–11.8)
POTASSIUM SERPL-SCNC: 4.5 MMOL/L (ref 3.5–5.5)
PROT SERPL-MCNC: 6.1 G/DL (ref 6.4–8.2)
RBC # BLD AUTO: 3.93 M/UL (ref 4.2–5.3)
SODIUM SERPL-SCNC: 139 MMOL/L (ref 136–145)
WBC # BLD AUTO: 6.9 K/UL (ref 4.6–13.2)

## 2025-02-12 PROCEDURE — 94640 AIRWAY INHALATION TREATMENT: CPT

## 2025-02-12 PROCEDURE — 6370000000 HC RX 637 (ALT 250 FOR IP): Performed by: HOSPITALIST

## 2025-02-12 PROCEDURE — 97116 GAIT TRAINING THERAPY: CPT

## 2025-02-12 PROCEDURE — 2580000003 HC RX 258: Performed by: INTERNAL MEDICINE

## 2025-02-12 PROCEDURE — 36415 COLL VENOUS BLD VENIPUNCTURE: CPT

## 2025-02-12 PROCEDURE — 97165 OT EVAL LOW COMPLEX 30 MIN: CPT

## 2025-02-12 PROCEDURE — 82962 GLUCOSE BLOOD TEST: CPT

## 2025-02-12 PROCEDURE — 80053 COMPREHEN METABOLIC PANEL: CPT

## 2025-02-12 PROCEDURE — 85025 COMPLETE CBC W/AUTO DIFF WBC: CPT

## 2025-02-12 PROCEDURE — 2500000003 HC RX 250 WO HCPCS: Performed by: HOSPITALIST

## 2025-02-12 PROCEDURE — 6360000002 HC RX W HCPCS: Performed by: INTERNAL MEDICINE

## 2025-02-12 RX ORDER — CEFUROXIME AXETIL 500 MG/1
500 TABLET ORAL 2 TIMES DAILY
Qty: 12 TABLET | Refills: 0 | Status: SHIPPED | OUTPATIENT
Start: 2025-02-12 | End: 2025-02-18

## 2025-02-12 RX ORDER — PREDNISONE 10 MG/1
10 TABLET ORAL 2 TIMES DAILY
Qty: 20 TABLET | Refills: 0 | Status: SHIPPED | OUTPATIENT
Start: 2025-02-12 | End: 2025-02-22

## 2025-02-12 RX ADMIN — PANTOPRAZOLE SODIUM 40 MG: 40 TABLET, DELAYED RELEASE ORAL at 11:08

## 2025-02-12 RX ADMIN — PREDNISONE 10 MG: 10 TABLET ORAL at 11:07

## 2025-02-12 RX ADMIN — APIXABAN 5 MG: 5 TABLET, FILM COATED ORAL at 11:08

## 2025-02-12 RX ADMIN — INSULIN LISPRO 2 UNITS: 100 INJECTION, SOLUTION INTRAVENOUS; SUBCUTANEOUS at 06:37

## 2025-02-12 RX ADMIN — DILTIAZEM HYDROCHLORIDE 120 MG: 120 CAPSULE, COATED, EXTENDED RELEASE ORAL at 11:08

## 2025-02-12 RX ADMIN — INSULIN GLARGINE 22 UNITS: 100 INJECTION, SOLUTION SUBCUTANEOUS at 11:08

## 2025-02-12 RX ADMIN — CEFEPIME 1000 MG: 1 INJECTION, POWDER, FOR SOLUTION INTRAMUSCULAR; INTRAVENOUS at 06:31

## 2025-02-12 RX ADMIN — SODIUM CHLORIDE, PRESERVATIVE FREE 10 ML: 5 INJECTION INTRAVENOUS at 11:09

## 2025-02-12 RX ADMIN — IPRATROPIUM BROMIDE AND ALBUTEROL SULFATE 1 DOSE: .5; 2.5 SOLUTION RESPIRATORY (INHALATION) at 07:21

## 2025-02-12 RX ADMIN — PREGABALIN 200 MG: 100 CAPSULE ORAL at 11:08

## 2025-02-12 RX ADMIN — METOPROLOL TARTRATE 100 MG: 50 TABLET, FILM COATED ORAL at 11:07

## 2025-02-12 RX ADMIN — IPRATROPIUM BROMIDE AND ALBUTEROL SULFATE 1 DOSE: .5; 2.5 SOLUTION RESPIRATORY (INHALATION) at 12:03

## 2025-02-12 ASSESSMENT — PAIN SCALES - GENERAL: PAINLEVEL_OUTOF10: 0

## 2025-02-12 NOTE — CARE COORDINATION
Medicare pt has received, reviewed, and signed 2nd Important Message from Medicare letter informing them of their right to appeal the discharge.  Signed copy has been placed on pt bedside chart.    Patient signed letter, acknowledging understanding of letter and right to appeal.  Copy of letter given to patient at bedside.  Original letter placed in chart.

## 2025-02-12 NOTE — PROGRESS NOTES
Bedside and Verbal shift change report given to Alyson RN (oncoming nurse) by Brenda Jim RN (offgoing nurse). Report included the following information Nurse Handoff Report, Adult Overview, Intake/Output, MAR, Recent Results, and Med Rec Status.

## 2025-02-12 NOTE — DISCHARGE SUMMARY
Discharge Summary    Patient: Lorena Nichole               Sex: female          DOA: 2/10/2025         YOB: 1955      Age:  69 y.o.        LOS:  LOS: 2 days                Admit Date: 2/10/2025    Discharge Date: 2/12/2025    Admission Diagnoses: Cellulitis [L03.90]    Discharge Diagnoses:    Hospital Problems             Last Modified POA    * (Principal) Cellulitis of leg, right 2/10/2025 Yes    Insufficiency, adrenal ([facility]) 2/10/2025 Yes    Overview Signed 3/19/2022  2:24 AM by Jules, Convprob     pt states due to multiple steroid injection for back pain and e           Ulcer of right lower extremity with muscle involvement without evidence of necrosis ([facility]) 2/10/2025 Yes    Diabetes mellitus, type 2 ([facility]) 2/10/2025 Yes    GERD (gastroesophageal reflux disease) 2/10/2025 Yes    Chronic back pain 2/10/2025 Yes    Hypertension 2/10/2025 Yes    Sleep apnea 2/10/2025 Yes    Overview Signed 3/19/2022  7:43 PM by Jules, Convprob1     does not use a device           Severe obesity (BMI 35.0-39.9) with comorbidity 2/10/2025 Yes    Anticoagulated 2/10/2025 Yes    Overview Signed 3/20/2022 12:16 AM by Jules, Convprob1     due to PVD           Immunocompromised ([facility]) 2/10/2025 Yes    History of DVT (deep vein thrombosis) 2/10/2025 Yes    Chronic diastolic (congestive) heart failure ([facility]) 2/10/2025 Yes    Cerebral AVM 2/10/2025 Yes    History of stroke 2/10/2025 Yes    COPD (chronic obstructive pulmonary disease) ([facility]) 2/10/2025 Yes        Discharge Condition: Good    Hospital Course ;   69 y.o.  female with past medical history significant for adrenal insufficiency chronically on prednisone, diabetes mellitus type 2 on metformin and glipizide, hypertension, sleep apnea, history of a stroke, history of diastolic congestive heart failure, history of COPD, cerebral AVM who presented with right leg swelling, redness and tenderness with an open ulcer.  Patient states that her condition started beginning of  January when she fell due to her mobility problem resulting in right leg open wound that need multiple stitches at that time.  The stitches were removed but the condition got worse in the area developed an open ulcer with draining blood and pus.  Patient states that she has been getting pain that she described as aching-like in the right leg for the last 2 weeks, moderate to severe, aggravated with ambulation, relieved with pain medication.  Condition associated weakness, and fatigue.  She denied any fever or chills.  Patient used oral antibiotic at home but that did not help her.    Immunocompromise patient with nonhealing cellulitis and ulcer of the right leg.  Patient has adrenal insufficiency and diabetes mellitus.  Culture of the blood and the ulcer was negative..   IV cefepime and vancomycin was given but then narrowed down to oral antibiotic per ID recommendation.  I reviewed patient's home medication from the [facility] and ordered in the chart.  Continue on Eliquis for the history of DVT, PE and a stroke.  Will continue on Cardizem and metoprolol for the blood pressure.  Arrange follow-up with wound care clinic and home health wound care.    Physical Exam:  /61   Pulse 71   Temp 98.6 °F (37 °C) (Oral)   Resp 18   Ht 1.549 m (5' 1\")   Wt 99.8 kg (220 lb)   SpO2 96%   BMI 41.57 kg/m²   General appearance: alert, cooperative, no distress, appears stated age  Head: Normocephalic, without obvious abnormality, atraumatic  Neck: supple, trachea midline  Lungs: clear to auscultation bilaterally  Heart: regular rate and rhythm, S1, S2 normal, no murmur, click, rub or gallop  Abdomen: soft, non-tender. Bowel sounds normal. No masses,  no organomegaly  Extremities: extremities normal, atraumatic, no cyanosis or edema  Skin: Skin color, texture, turgor normal. No rashes or lesions  Neurologic: Grossly normal    Labs: Results:       Chemistry Recent Labs     02/10/25  1503 02/11/25  0506 02/12/25  0458   NA

## 2025-02-12 NOTE — PROGRESS NOTES
[facility]  (A Division of [facility])                                                           Date of Admission: 2/10/2025       ID Consult for antibiotic management RLE cellulitis in patient with chronic steroid rx    C/C: leg swelling and pain    Current Antimicrobials:    Prior Antimicrobials:    Cefepime/pip tazo 2/10 to date   Vanco 2/10 to 2/11   Allergy to antibiotics: NA       Assessment:     RLE cellulitis- at site of prior trauma from Jan 2025. Suspect strep infection- Improving  Cut to right leg in Jan 2025, had stiches placed and home health care in place  Swelling and pian increased in last few days  No associated fever or leucocytosis  No DVT on Venous duplex- 2/10/25    Elevated markers-ESR 57 , CRP 2.6    Adrenal insufficiency- takes PO steroid    Micro:  2/10-blood culture- [facility]  2/10- right leg wound culture- 3+ GPR, in progress    Medical History:   Diagnosis Date    Anticoagulated     due to PVD    Arrhythmia     sees Dr. Gonzales - uses Beta Blockers for rate control    Asthma     Chronic back pain     Eczema     GERD (gastroesophageal reflux disease)     Hypercholesterolemia     Hypertension     Insufficiency, adrenal (HCC)     pt states due to multiple steroid injection for back pain and eczema    Pre-diabetes     last HgbA1C (early 2017) was under 7.0    PVD (peripheral vascular disease) (HCC)     hx of carotid disease    Severe obesity     Severe obesity (BMI 35.0-39.9) with comorbidity     Sleep apnea     does not use a device    TIA (transient ischemic attack)     X 2 related to carotid disease       Recommendation -- ID related:     Eryspelas like erythema improved back to wound border, seems like strep cellulitis    Can switch over to cefuroxime 500mg PO BID X5 days  Can DC from ID side  Wound care- per wound team. It can take longer to heal  Supportive care and additional treatment per respective team     Diagnosis and treatment plan

## 2025-02-12 NOTE — PROGRESS NOTES
Care  assisting Care Mgrs Jennifer Sánchez RN and Abelardo Yun RN with Discharge Planning needs for patient.  Updates sent to [facility] (patient's preference) for review.    Will continue to follow for further needs and discharge plans.

## 2025-02-12 NOTE — PROGRESS NOTES
OCCUPATIONAL THERAPY EVALUATION/DISCHARGE    Patient: Lorena Nichole (69 y.o. female)  Date of initial service: 2/12/2025  Date of final service: 2/12/2025  Number of sessions completed: 1  Primary Diagnosis: Cellulitis [L03.90]       Precautions: Fall Risk,  ,  ,  ,  ,  ,  ,               PLOF: Patient completed ADLs independently     ASSESSMENT AND RECOMMENDATIONS:  Patient presented seated EOB with gripper socks. Patient with no visitors present for entire session. Patient wearing own pants and double gowns. Patient completed assessment of ADLs and BUE strength, ROM (see details below). Patient completed toilet transfer, toileting, and grooming at sink before returning to bed. , No Skilled OT: Independent with ADL's    Maximum therapeutic gains met at current level of care and patient will be discharged from occupational therapy at this time.    Further Equipment Recommendations for Discharge:  ,        [facility]: AM-PAC Inpatient Daily Activity Raw Score: 24/24 -    At this time and based on an AM-PAC score, no further OT is recommended upon discharge due to patient is independent with ADLs.  Recommend patient returns to prior setting with prior services.    This [facility] score should be considered in conjunction with interdisciplinary team recommendations to determine the most appropriate discharge setting. Patient's social support, diagnosis, medical stability, and prior level of function should also be taken into consideration.     SUBJECTIVE:   Patient stated “I called to go to the bathroom 50 minutes ago”    OBJECTIVE DATA SUMMARY:     Past Medical History:   Diagnosis Date    Anticoagulated     due to PVD    Arrhythmia     sees Dr. Gonzales - uses Beta Blockers for rate control    Asthma     Chronic back pain     Eczema     GERD (gastroesophageal reflux disease)     Hypercholesterolemia     Hypertension     Insufficiency, adrenal (HCC)     pt states due to multiple steroid injection for back pain and eczema

## 2025-02-12 NOTE — PROGRESS NOTES
Physical Therapy Goals:  Continued 2/12/2025 to be met within 7-10 days.  Short Term Goals  Short Term Goal 1: Patient will perform supine to/from sit with independence.  Short Term Goal 2: Patient will perform sit to/from stand with mod I/RW in prep for ambulation activity.  Short Term Goal 3: Patient will perform ambulation 150 ft with S/mod I/RW for increased functional mobility.  Short Term Goal 4: Patient will perform step nego x1  with S/RW for home re-entry.    []  Patient has met MD karley rondon for d/c home   []  Recommend HH with 24 hour adult care   []  Benefit from additional acute PT session(s)      PHYSICAL THERAPY TREATMENT    Patient: Lorena Nichole (69 y.o. female)  Date: 2/12/2025  Diagnosis: Cellulitis [L03.90] Cellulitis of leg, right      Precautions: Fall Risk,  ,  ,  ,  ,  ,  ,    PLOF: independent, ambulatory with a cane    ASSESSMENT:  Assessment  Assessment: Pt supine in bed upon arrival.  No difficulty performing bed mobility or sit to stand.  Ambulated without RW, steady reciprocal gt pattern, no LOB or path deviations.  Returned to room and back to supine without assist.  Activity Tolerance: Patient tolerated treatment well  Discharge Recommendations: Home with Home health PT    Progression toward goals:   [x]      Improving appropriately and progressing toward goals  []      Improving slowly and progressing toward goals  []      Not making progress toward goals and plan of care will be adjusted     PLAN:  Patient continues to benefit from skilled intervention to address the above impairments.  Continue treatment per established plan of care.    Further Equipment Recommendations for Discharge: n/a    AMPA: 17/20    This AMPAC score should be considered in conjunction with interdisciplinary team recommendations to determine the most appropriate discharge setting. Patient's social support, diagnosis, medical stability, and prior level of function should also be taken into

## 2025-02-14 LAB
BACTERIA SPEC CULT: ABNORMAL
GRAM STN SPEC: ABNORMAL
GRAM STN SPEC: ABNORMAL
SERVICE CMNT-IMP: ABNORMAL

## 2025-02-16 LAB
BACTERIA SPEC CULT: NORMAL
BACTERIA SPEC CULT: NORMAL
SERVICE CMNT-IMP: NORMAL
SERVICE CMNT-IMP: NORMAL

## 2025-02-24 ENCOUNTER — HOSPITAL ENCOUNTER (OUTPATIENT)
Facility: HOSPITAL | Age: 70
Discharge: HOME | End: 2025-02-24
Attending: FAMILY MEDICINE | Admitting: FAMILY MEDICINE
Payer: MEDICARE

## 2025-02-24 VITALS
DIASTOLIC BLOOD PRESSURE: 88 MMHG | SYSTOLIC BLOOD PRESSURE: 124 MMHG | HEART RATE: 60 BPM | RESPIRATION RATE: 18 BRPM | TEMPERATURE: 97.3 F

## 2025-02-24 DIAGNOSIS — L97.213 NON-PRESSURE CHRONIC ULCER OF RIGHT CALF WITH NECROSIS OF MUSCLE (HCC): Primary | ICD-10-CM

## 2025-02-24 DIAGNOSIS — I87.2 PERIPHERAL VENOUS INSUFFICIENCY: ICD-10-CM

## 2025-02-24 DIAGNOSIS — L97.915 ULCER OF RIGHT LOWER EXTREMITY WITH MUSCLE INVOLVEMENT WITHOUT EVIDENCE OF NECROSIS (HCC): ICD-10-CM

## 2025-02-24 DIAGNOSIS — R60.0 LOWER EXTREMITY EDEMA: ICD-10-CM

## 2025-02-24 PROCEDURE — 11043 DBRDMT MUSC&/FSCA 1ST 20/<: CPT

## 2025-02-24 RX ORDER — GINSENG 100 MG
CAPSULE ORAL PRN
OUTPATIENT
Start: 2025-02-24

## 2025-02-24 RX ORDER — SILVER SULFADIAZINE 10 MG/G
CREAM TOPICAL PRN
OUTPATIENT
Start: 2025-02-24

## 2025-02-24 RX ORDER — CLOBETASOL PROPIONATE 0.5 MG/G
OINTMENT TOPICAL PRN
OUTPATIENT
Start: 2025-02-24

## 2025-02-24 RX ORDER — LIDOCAINE HYDROCHLORIDE 20 MG/ML
JELLY TOPICAL PRN
OUTPATIENT
Start: 2025-02-24

## 2025-02-24 RX ORDER — MUPIROCIN 20 MG/G
OINTMENT TOPICAL PRN
OUTPATIENT
Start: 2025-02-24

## 2025-02-24 RX ORDER — GENTAMICIN SULFATE 1 MG/G
OINTMENT TOPICAL PRN
OUTPATIENT
Start: 2025-02-24

## 2025-02-24 RX ORDER — SODIUM CHLOR/HYPOCHLOROUS ACID 0.033 %
SOLUTION, IRRIGATION IRRIGATION PRN
OUTPATIENT
Start: 2025-02-24

## 2025-02-24 RX ORDER — LIDOCAINE HYDROCHLORIDE 40 MG/ML
SOLUTION TOPICAL PRN
OUTPATIENT
Start: 2025-02-24

## 2025-02-24 RX ORDER — LIDOCAINE 40 MG/G
CREAM TOPICAL PRN
OUTPATIENT
Start: 2025-02-24

## 2025-02-24 RX ORDER — BETAMETHASONE DIPROPIONATE 0.5 MG/G
CREAM TOPICAL PRN
OUTPATIENT
Start: 2025-02-24

## 2025-02-24 RX ORDER — NEOMYCIN/BACITRACIN/POLYMYXINB 3.5-400-5K
OINTMENT (GRAM) TOPICAL PRN
OUTPATIENT
Start: 2025-02-24

## 2025-02-24 RX ORDER — BACITRACIN ZINC AND POLYMYXIN B SULFATE 500; 1000 [USP'U]/G; [USP'U]/G
OINTMENT TOPICAL PRN
OUTPATIENT
Start: 2025-02-24

## 2025-02-24 RX ORDER — TRIAMCINOLONE ACETONIDE 1 MG/G
OINTMENT TOPICAL PRN
OUTPATIENT
Start: 2025-02-24

## 2025-02-24 RX ORDER — LIDOCAINE 50 MG/G
OINTMENT TOPICAL PRN
OUTPATIENT
Start: 2025-02-24

## 2025-02-24 NOTE — PLAN OF CARE
Discharge Instructions from  Wound Care Clinic at [facility]   [address]   [address]  [place] [address] [phone] Fax [phone]    NAME:  Lorena Nichole  YOB: 1955  MEDICAL RECORD NUMBER:  [id number]  DATE:  02/24/2025    Wound Cleansing:   Do not scrub or use excessive force.  Cleanse wound prior to applying a clean dressing with:  [] Normal Saline [] Keep Wound Dry in Shower    [x] Wound Cleanser   [] Cleanse wound with Mild Soap & Water  [] May Shower at Discharge   [] Other:      Dressings:           Wound Location: Right shin   [x] Apply Primary Dressing:       [] MediHoney Gel [] Alginate with Silver [] Alginate   [x] Collagen [] Collagen with Silver   [] Santyl with Moisten saline gauze     [] Hydrocolloid   [] MediHoney Alginate [] Foam with Silver   [] Foam   [x] Hydrofera Blue Transfer  (or classic, please do not use HFB ready with plastic backing as this is intended only for low exuding/PT wounds)   [x] Other:  Alginate, superabsorbent dressing and zinc 2 layer wrap    Avoid contact of tape with skin.  [x] Change dressing: [] Daily    [] Every Other Day [] Three times per week   [] Once a week [] Do Not Change Dressing   [x] Other: Mondays in wound clinic, Wednesday and Friday by               Return Appointment:  [] Wound and dressing supply provider:   [] ECF or Home Healthcare: [facility]  [] Wound Assessment: [] Physician or NP scheduled for Wound Assessment:   [] Return Appointment: With MD  in  1 Week(s)  [] Ordered tests:      Electronically signed Nicole Cota BSN RN CWCN CMSRN on 2/24/2025 at 2:14 PM     Wound Care Center Information: Should you experience any significant changes in your wound(s) or have questions about your wound care, please contact the [facility] at MONDAY - FRIDAY 8:00 am - 4:30.  If you need help with your wound outside these hours and cannot wait until we are again available, contact your PCP or go

## 2025-02-24 NOTE — PROGRESS NOTES
Pretibial Right (Active)   Wound Image   02/11/25 1607   Wound Etiology Traumatic 02/24/25 0950   Dressing Status New dressing applied 02/24/25 0950   Wound Cleansed Wound cleanser 02/24/25 0950   Dressing/Treatment Collagen;Hydrofera blue 02/24/25 0950   Offloading for Diabetic Foot Ulcers Offloading not required 02/24/25 0950   Dressing Change Due 02/26/25 02/24/25 0950   Wound Length (cm) 4.2 cm 02/24/25 0950   Wound Width (cm) 3 cm 02/24/25 0950   Wound Depth (cm) 0.2 cm 02/24/25 0950   Wound Surface Area (cm^2) 12.6 cm^2 02/24/25 0950   Change in Wound Size % (l*w) 30 02/24/25 0950   Wound Volume (cm^3) 2.52 cm^3 02/24/25 0950   Wound Healing % 53 02/24/25 0950   Post-Procedure Length (cm) 4.2 cm 02/24/25 0950   Post-Procedure Width (cm) 3 cm 02/24/25 0950   Post-Procedure Depth (cm) 0.7 cm 02/24/25 0950   Post-Procedure Surface Area (cm^2) 12.6 cm^2 02/24/25 0950   Post-Procedure Volume (cm^3) 8.82 cm^3 02/24/25 0950   Wound Assessment Slough;Granulation tissue 02/24/25 0950   Drainage Amount Moderate (25-50%) 02/24/25 0950   Drainage Description Serosanguinous 02/24/25 0950   Odor None 02/24/25 0950   Sneha-wound Assessment Blanchable erythema;Warm 02/24/25 0950   Margins Defined edges 02/24/25 0950   Wound Thickness Description not for Pressure Injury Full thickness 02/24/25 0950   Number of days: 13          -------    Past Medical History:   Diagnosis Date    Anticoagulated     due to PVD    Arrhythmia     sees Dr. Gonzales - uses Beta Blockers for rate control    Asthma     Chronic back pain     Eczema     GERD (gastroesophageal reflux disease)     Hypercholesterolemia     Hypertension     Insufficiency, adrenal     pt states due to multiple steroid injection for back pain and eczema    Pre-diabetes     last HgbA1C (early 2017) was under 7.0    PVD (peripheral vascular disease)     hx of carotid disease    Severe obesity     Severe obesity (BMI 35.0-39.9) with comorbidity     Sleep apnea     does not use a

## 2025-02-24 NOTE — FLOWSHEET NOTE
02/24/25 0950   Wound 02/10/25 Pretibial Right   Date First Assessed/Time First Assessed: 02/10/25 1103   Present on Original Admission: Yes  Location: Pretibial  Wound Location Orientation: Right   Wound Image     Wound Etiology Traumatic   Dressing Status New dressing applied   Wound Cleansed Wound cleanser   Dressing/Treatment Collagen;Hydrofera blue   Offloading for Diabetic Foot Ulcers Offloading not required   Dressing Change Due 02/26/25   Wound Length (cm) 4.2 cm   Wound Width (cm) 3 cm   Wound Depth (cm) 0.2 cm   Wound Surface Area (cm^2) 12.6 cm^2   Change in Wound Size % (l*w) 30   Wound Volume (cm^3) 2.52 cm^3   Wound Healing % 53   Post-Procedure Length (cm) 4.2 cm   Post-Procedure Width (cm) 3 cm   Post-Procedure Depth (cm) 0.7 cm   Post-Procedure Surface Area (cm^2) 12.6 cm^2   Post-Procedure Volume (cm^3) 8.82 cm^3   Wound Assessment Slough;Granulation tissue   Drainage Amount Moderate (25-50%)   Drainage Description Serosanguinous   Odor None   Sneha-wound Assessment Blanchable erythema;Warm   Margins Defined edges   Wound Thickness Description not for Pressure Injury Full thickness   Sourav Scale   Sensory Perceptions 3   Moisture 3   Activity 3   Mobility 3   Nutrition 3   Friction and Shear 2   Sourav Scale Score 17   Wound Follow Up   Require Follow Up Yes     Multilayer Compression Wrap   (Not Unna) Below the Knee    NAME:  Lorena Nichole  YOB: 1955  MEDICAL RECORD NUMBER:  [id number]  DATE:  2/24/2025    Multilayer compression wrap: Removed old Multilayer wrap if indicated and wash leg with mild soap/water.  Applied moisturizing agent to dry skin as needed.   Applied primary and secondary dressing as ordered.  Applied multilayered dressing below the knee to right lower leg.  Instructed patient/caregiver not to remove dressing and to keep it clean and dry.   Instructed patient/caregiver on complications to report to provider, such as pain, numbness in toes, heavy drainage,

## 2025-02-24 NOTE — DISCHARGE INSTRUCTIONS
Discharge Instructions from  Wound Care Clinic at [facility]   [address]   [address]  [place] [address] [phone] Fax [phone]    NAME:  Lorena Nichole  YOB: 1955  MEDICAL RECORD NUMBER:  [id number]  DATE:  02/24/2025    Wound Cleansing:   Do not scrub or use excessive force.  Cleanse wound prior to applying a clean dressing with:  [] Normal Saline [] Keep Wound Dry in Shower    [x] Wound Cleanser   [] Cleanse wound with Mild Soap & Water  [] May Shower at Discharge   [] Other:      Dressings:           Wound Location: Right shin   [x] Apply Primary Dressing:       [] MediHoney Gel [] Alginate with Silver [] Alginate   [x] Collagen [] Collagen with Silver   [] Santyl with Moisten saline gauze     [] Hydrocolloid   [] MediHoney Alginate [] Foam with Silver   [] Foam   [x] Hydrofera Blue Transfer  (or classic, please do not use HFB ready with plastic backing as this is intended only for low exuding/PT wounds)   [x] Other:  Alginate, superabsorbent dressing and zinc 2 layer wrap    Avoid contact of tape with skin.  [x] Change dressing: [] Daily    [] Every Other Day [] Three times per week   [] Once a week [] Do Not Change Dressing   [x] Other: Mondays in wound clinic, Wednesday and Friday by               Return Appointment:  [] Wound and dressing supply provider:   [] ECF or Home Healthcare: [facility]  [] Wound Assessment: [] Physician or NP scheduled for Wound Assessment:   [] Return Appointment: With MD  in  1 Week(s)  [] Ordered tests:      Electronically signed Nicole Cota BSN RN CWCN CMSRN on 2/24/2025 at 2:14 PM     Wound Care Center Information: Should you experience any significant changes in your wound(s) or have questions about your wound care, please contact the [facility] Center at MONDAY - FRIDAY 8:00 am - 4:30.  If you need help with your wound outside these hours and cannot wait until we are again available, contact your PCP or go

## 2025-03-03 ENCOUNTER — HOSPITAL ENCOUNTER (OUTPATIENT)
Facility: HOSPITAL | Age: 70
Discharge: HOME | End: 2025-03-03
Payer: MEDICARE

## 2025-03-03 VITALS
SYSTOLIC BLOOD PRESSURE: 132 MMHG | DIASTOLIC BLOOD PRESSURE: 67 MMHG | HEART RATE: 89 BPM | TEMPERATURE: 97.7 F | RESPIRATION RATE: 18 BRPM

## 2025-03-03 DIAGNOSIS — L97.915 ULCER OF RIGHT LOWER EXTREMITY WITH MUSCLE INVOLVEMENT WITHOUT EVIDENCE OF NECROSIS (HCC): Primary | ICD-10-CM

## 2025-03-03 DIAGNOSIS — I87.2 PERIPHERAL VENOUS INSUFFICIENCY: ICD-10-CM

## 2025-03-03 DIAGNOSIS — R60.0 LOWER EXTREMITY EDEMA: ICD-10-CM

## 2025-03-03 PROCEDURE — 11043 DBRDMT MUSC&/FSCA 1ST 20/<: CPT

## 2025-03-03 RX ORDER — GINSENG 100 MG
CAPSULE ORAL PRN
OUTPATIENT
Start: 2025-03-03

## 2025-03-03 RX ORDER — MUPIROCIN 20 MG/G
OINTMENT TOPICAL PRN
OUTPATIENT
Start: 2025-03-03

## 2025-03-03 RX ORDER — NEOMYCIN/BACITRACIN/POLYMYXINB 3.5-400-5K
OINTMENT (GRAM) TOPICAL PRN
OUTPATIENT
Start: 2025-03-03

## 2025-03-03 RX ORDER — GENTAMICIN SULFATE 1 MG/G
OINTMENT TOPICAL PRN
OUTPATIENT
Start: 2025-03-03

## 2025-03-03 RX ORDER — SILVER SULFADIAZINE 10 MG/G
CREAM TOPICAL PRN
OUTPATIENT
Start: 2025-03-03

## 2025-03-03 RX ORDER — LIDOCAINE 40 MG/G
CREAM TOPICAL PRN
OUTPATIENT
Start: 2025-03-03

## 2025-03-03 RX ORDER — BETAMETHASONE DIPROPIONATE 0.5 MG/G
CREAM TOPICAL PRN
OUTPATIENT
Start: 2025-03-03

## 2025-03-03 RX ORDER — LIDOCAINE HYDROCHLORIDE 40 MG/ML
SOLUTION TOPICAL PRN
OUTPATIENT
Start: 2025-03-03

## 2025-03-03 RX ORDER — BACITRACIN ZINC AND POLYMYXIN B SULFATE 500; 1000 [USP'U]/G; [USP'U]/G
OINTMENT TOPICAL PRN
OUTPATIENT
Start: 2025-03-03

## 2025-03-03 RX ORDER — LIDOCAINE HYDROCHLORIDE 20 MG/ML
JELLY TOPICAL PRN
OUTPATIENT
Start: 2025-03-03

## 2025-03-03 RX ORDER — PREDNISONE 10 MG/1
10 TABLET ORAL DAILY
COMMUNITY

## 2025-03-03 RX ORDER — TRIAMCINOLONE ACETONIDE 1 MG/G
OINTMENT TOPICAL PRN
OUTPATIENT
Start: 2025-03-03

## 2025-03-03 RX ORDER — CLOBETASOL PROPIONATE 0.5 MG/G
OINTMENT TOPICAL PRN
OUTPATIENT
Start: 2025-03-03

## 2025-03-03 RX ORDER — SODIUM CHLOR/HYPOCHLOROUS ACID 0.033 %
SOLUTION, IRRIGATION IRRIGATION PRN
OUTPATIENT
Start: 2025-03-03

## 2025-03-03 RX ORDER — LIDOCAINE 50 MG/G
OINTMENT TOPICAL PRN
OUTPATIENT
Start: 2025-03-03

## 2025-03-03 NOTE — DISCHARGE INSTRUCTIONS
Called pt back per message of call center. Pt didn't answer. LVM to call office in ask for [place]/PSR      ----- Message from Lorena Castillo sent at 3/9/2022  4:42 PM EST -----  Subject: Appointment Request    Reason for Call: Routine Existing Condition Follow Up    QUESTIONS  Type of Appointment? Established Patient  Reason for appointment request? Available appointments did not meet   patient need  Additional Information for Provider? Pt has another appt on the 6th of April at 830am and vs it being so close to the appt she has booked with us   she was wondering if it can be pushed back until either 1030 or 11am so   that she doesn't miss it. Before cancelling her current slot we wanted to   see if any adjustments can be made to accommodate her. Can you please   contact the pt?  ---------------------------------------------------------------------------  --------------  CALL BACK INFO  What is the best way for the office to contact you? OK to leave message on   voicemail  Preferred Call Back Phone Number? [phone]  ---------------------------------------------------------------------------  --------------  SCRIPT ANSWERS  Relationship to Patient? Self  Have your symptoms changed? No  Is this follow up request related to routine Diabetes Management? No  Have you been diagnosed with, awaiting test results for, or told that you   are suspected of having COVID-19 (Coronavirus)? (If patient has tested   negative or was tested as a requirement for work, school, or travel and   not based on symptoms, answer no)? No  Within the past 10 days have you developed any of the following symptoms   (answer no if symptoms have been present longer than 10 days or began   more than 10 days ago)?  Fever or Chills, Cough, Shortness of breath or   difficulty breathing, Loss of taste or smell, Sore throat, Nasal   congestion, Sneezing or runny nose, Fatigue or generalized body aches   (answer no if pain is specific to a body part e.g. back pain), Diarrhea,   Headache? No  Have you had close contact with someone with COVID-19 in the last 7 days? No  (Service Expert - click yes below to proceed with [facility] As Usual   Scheduling)?  Yes wound care, please contact the [facility] at MONDAY - FRIDAY 8:00 am - 4:30.  If you need help with your wound outside these hours and cannot wait until we are again available, contact your PCP or go to the hospital emergency room.      PLEASE NOTE: IF YOU ARE UNABLE TO OBTAIN WOUND SUPPLIES, CONTINUE TO USE THE SUPPLIES YOU HAVE AVAILABLE UNTIL YOU ARE ABLE TO REACH US. IT IS MOST IMPORTANT TO KEEP THE WOUND COVERED AT ALL TIMES.

## 2025-03-03 NOTE — PROGRESS NOTES
nightly 12/17/24 12/17/25  Bell Dickinson MD   donepezil (ARICEPT) 10 MG tablet Take 1 tablet by mouth daily 1/20/25   Bell Dickinson MD   FEROSUL 325 (65 Fe) MG tablet Take 1 tablet by mouth daily 5/13/24   Provider, Historical, MD   TRELEGY ELLIPTA 200-62.5-25 MCG/ACT AEPB inhaler Inhale 1 puff into the lungs daily 12/23/24   Bell Dickinson MD   furosemide (LASIX) 40 MG tablet Take 1 tablet by mouth daily 9/18/24   Bell Dickinson MD   glipiZIDE (GLUCOTROL) 5 MG tablet Take 1 tablet by mouth daily 11/12/24   Bell Dickinson MD   hydrOXYzine pamoate (VISTARIL) 100 MG capsule Take 1 capsule by mouth nightly 12/17/24   Bell Dickinson MD   metFORMIN (GLUCOPHAGE) 1000 MG tablet Take 1 tablet by mouth 2 times daily (with meals) 12/17/24 12/12/25  Bell Dickinson MD   solifenacin (VESICARE) 10 MG tablet Take 1 tablet by mouth every evening    Bell Dickinson MD   nortriptyline (PAMELOR) 25 MG capsule Take 3 capsules by mouth nightly 12/23/24 4/22/25  Bell Dickinson MD   tolterodine (DETROL LA) 4 MG extended release capsule Take 1 capsule by mouth daily 12/17/24   Bell Dickinson MD   vitamin D3 (CHOLECALCIFEROL) 125 MCG (5000 UT) TABS tablet Take by mouth daily    Automatic Reconciliation, Ar   ergocalciferol (ERGOCALCIFEROL) 1.25 MG (46538 UT) capsule Take 1 capsule by mouth    Automatic Reconciliation, Ar   metoprolol (LOPRESSOR) 100 MG tablet Take 1 tablet by mouth 2 times daily    Automatic Reconciliation, Ar   pantoprazole (PROTONIX) 40 MG tablet Take 1 tablet by mouth daily    Automatic Reconciliation, Ar   pregabalin (LYRICA) 200 MG capsule Take 1 capsule by mouth 2 times daily.    Automatic Reconciliation, Ar   valsartan (DIOVAN) 40 MG tablet Take 1 tablet by mouth daily    Automatic Reconciliation, Ar      Allergies   Allergen Reactions    Adhesive Tape      paper    Iodine Cough and Swelling          Signed By: Nayely Rebolledo MD      03/03/25

## 2025-03-03 NOTE — PLAN OF CARE
wound care, please contact the [facility] Outpatient Wound Center at MONDAY - FRIDAY 8:00 am - 4:30.  If you need help with your wound outside these hours and cannot wait until we are again available, contact your PCP or go to the hospital emergency room.      PLEASE NOTE: IF YOU ARE UNABLE TO OBTAIN WOUND SUPPLIES, CONTINUE TO USE THE SUPPLIES YOU HAVE AVAILABLE UNTIL YOU ARE ABLE TO REACH US. IT IS MOST IMPORTANT TO KEEP THE WOUND COVERED AT ALL TIMES.

## 2025-03-03 NOTE — FLOWSHEET NOTE
drainage, and slippage of dressing.  Instructed patient on purpose of compression dressing and on activity and exercise recommendations.      Electronically signed by Nicole Cota RN on 3/3/2025 at 1:16 PM

## 2025-03-10 ENCOUNTER — HOSPITAL ENCOUNTER (OUTPATIENT)
Facility: HOSPITAL | Age: 70
Discharge: HOME | End: 2025-03-10
Payer: MEDICARE

## 2025-03-10 VITALS
SYSTOLIC BLOOD PRESSURE: 126 MMHG | HEART RATE: 96 BPM | OXYGEN SATURATION: 93 % | TEMPERATURE: 97.9 F | DIASTOLIC BLOOD PRESSURE: 73 MMHG | RESPIRATION RATE: 18 BRPM

## 2025-03-10 DIAGNOSIS — L97.915 ULCER OF RIGHT LOWER EXTREMITY WITH MUSCLE INVOLVEMENT WITHOUT EVIDENCE OF NECROSIS (HCC): ICD-10-CM

## 2025-03-10 DIAGNOSIS — R60.0 LOWER EXTREMITY EDEMA: Primary | ICD-10-CM

## 2025-03-10 DIAGNOSIS — I87.2 PERIPHERAL VENOUS INSUFFICIENCY: ICD-10-CM

## 2025-03-10 PROCEDURE — 6370000000 HC RX 637 (ALT 250 FOR IP): Performed by: FAMILY MEDICINE

## 2025-03-10 PROCEDURE — 11042 DBRDMT SUBQ TIS 1ST 20SQCM/<: CPT

## 2025-03-10 RX ORDER — LIDOCAINE HYDROCHLORIDE 20 MG/ML
JELLY TOPICAL PRN
OUTPATIENT
Start: 2025-03-10

## 2025-03-10 RX ORDER — LIDOCAINE HYDROCHLORIDE 40 MG/ML
SOLUTION TOPICAL PRN
OUTPATIENT
Start: 2025-03-10

## 2025-03-10 RX ORDER — GINSENG 100 MG
CAPSULE ORAL PRN
OUTPATIENT
Start: 2025-03-10

## 2025-03-10 RX ORDER — LIDOCAINE HYDROCHLORIDE 20 MG/ML
JELLY TOPICAL PRN
Status: DISCONTINUED | OUTPATIENT
Start: 2025-03-10 | End: 2025-03-11 | Stop reason: HOSPADM

## 2025-03-10 RX ORDER — MUPIROCIN 20 MG/G
OINTMENT TOPICAL PRN
OUTPATIENT
Start: 2025-03-10

## 2025-03-10 RX ORDER — LIDOCAINE 50 MG/G
OINTMENT TOPICAL PRN
OUTPATIENT
Start: 2025-03-10

## 2025-03-10 RX ORDER — SODIUM CHLOR/HYPOCHLOROUS ACID 0.033 %
SOLUTION, IRRIGATION IRRIGATION PRN
OUTPATIENT
Start: 2025-03-10

## 2025-03-10 RX ORDER — BACITRACIN ZINC AND POLYMYXIN B SULFATE 500; 1000 [USP'U]/G; [USP'U]/G
OINTMENT TOPICAL PRN
OUTPATIENT
Start: 2025-03-10

## 2025-03-10 RX ORDER — SILVER SULFADIAZINE 10 MG/G
CREAM TOPICAL PRN
OUTPATIENT
Start: 2025-03-10

## 2025-03-10 RX ORDER — BETAMETHASONE DIPROPIONATE 0.5 MG/G
CREAM TOPICAL PRN
OUTPATIENT
Start: 2025-03-10

## 2025-03-10 RX ORDER — CLOBETASOL PROPIONATE 0.5 MG/G
OINTMENT TOPICAL PRN
OUTPATIENT
Start: 2025-03-10

## 2025-03-10 RX ORDER — NEOMYCIN/BACITRACIN/POLYMYXINB 3.5-400-5K
OINTMENT (GRAM) TOPICAL PRN
OUTPATIENT
Start: 2025-03-10

## 2025-03-10 RX ORDER — TRIAMCINOLONE ACETONIDE 1 MG/G
OINTMENT TOPICAL PRN
OUTPATIENT
Start: 2025-03-10

## 2025-03-10 RX ORDER — GENTAMICIN SULFATE 1 MG/G
OINTMENT TOPICAL PRN
OUTPATIENT
Start: 2025-03-10

## 2025-03-10 RX ORDER — LIDOCAINE 40 MG/G
CREAM TOPICAL PRN
OUTPATIENT
Start: 2025-03-10

## 2025-03-10 RX ADMIN — LIDOCAINE HYDROCHLORIDE: 20 JELLY TOPICAL at 09:53

## 2025-03-10 NOTE — FLOWSHEET NOTE
03/10/25 0942   Wound 02/10/25 Pretibial Right   Date First Assessed/Time First Assessed: 02/10/25 1103   Present on Original Admission: Yes  Location: Pretibial  Wound Location Orientation: Right   Wound Image    Wound Etiology Traumatic   Dressing Status New dressing applied   Wound Cleansed Wound cleanser   Dressing/Treatment Collagen;Hydrofera blue;Other (comment)  (2 layer wrap)   Offloading for Diabetic Foot Ulcers Offloading not required   Dressing Change Due 03/17/25   Wound Length (cm) 2 cm   Wound Width (cm) 2.3 cm   Wound Depth (cm) 0.8 cm   Wound Surface Area (cm^2) 4.6 cm^2   Change in Wound Size % (l*w) 74.44   Wound Volume (cm^3) 3.68 cm^3   Wound Healing % 32   Post-Procedure Length (cm) 2 cm   Post-Procedure Width (cm) 2.3 cm   Post-Procedure Depth (cm) 1 cm   Post-Procedure Surface Area (cm^2) 4.6 cm^2   Post-Procedure Volume (cm^3) 4.6 cm^3   Wound Assessment Granulation tissue   Drainage Amount Moderate (25-50%)   Drainage Description Serosanguinous   Odor None   Sneha-wound Assessment Blanchable erythema   Margins Defined edges   Wound Thickness Description not for Pressure Injury Full thickness   Sourav Scale   Sensory Perceptions 4   Moisture 4   Activity 3   Mobility 3   Nutrition 3   Friction and Shear 3   Sourav Scale Score 20   Wound Follow Up   Require Follow Up Yes     Multilayer Compression Wrap   (Not Unna) Below the Knee    NAME:  Lorena Nichole  YOB: 1955  MEDICAL RECORD NUMBER:  [id number]  DATE:  3/10/2025    Multilayer compression wrap: Removed old Multilayer wrap if indicated and wash leg with mild soap/water.  Applied moisturizing agent to dry skin as needed.   Applied primary and secondary dressing as ordered.  Applied multilayered dressing below the knee to right lower leg.  Instructed patient/caregiver not to remove dressing and to keep it clean and dry.   Instructed patient/caregiver on complications to report to provider, such as pain, numbness in toes,  no

## 2025-03-10 NOTE — DISCHARGE INSTRUCTIONS
Discharge Instructions from  Wound Care Clinic at [facility]   [address]   [address]  [place] [address] [phone] Fax [phone]    NAME:  Lorena Nichole  YOB: 1955  MEDICAL RECORD NUMBER:  [id number]  DATE:  3/10/24    Wound Cleansing:   Do not scrub or use excessive force.  Cleanse wound prior to applying a clean dressing with:  [] Normal Saline [] Keep Wound Dry in Shower    [x] Wound Cleanser   [] Cleanse wound with Mild Soap & Water  [] May Shower at Discharge   [] Other:      Topical Treatments:  Do not apply lotions, creams, or ointments to wound bed unless directed.   [] Apply moisturizing lotion to skin surrounding the wound prior to dressing change.  [] Apply antifungal ointment to skin surrounding the wound prior to dressing change.  [] Apply thin film of moisture barrier ointment to skin immediately around wound.  [] Other:       Dressings:           Wound Location right lower leg   [x] Apply Primary Dressing:       [] MediHoney Gel [] Alginate with Silver [] Alginate   [x] Collagen [] Collagen with Silver   [] Santyl with Moisten saline gauze     [] Hydrocolloid   [] MediHoney Alginate [] Foam with Silver   [] Foam   [x] Hydrofera Blue    [] Mepilex Border    [] Moisten with Saline [] Hydrogel [] Mepitel     [] Bactroban/Mupirocin [] Polysporin  [] Other:    [] Pack wound loosely with  [] Iodoform   [] Plain Packing  [] Other   [] Cover and Secure with:     [] Gauze [] Maris [] Kerlix   [] Ace Wrap [] Cover Roll Tape [] ABD     [] Other:    Avoid contact of tape with skin.  [] Change dressing: [] Daily    [] Every Other Day [x] Two times per week with home health; once in clinic   [] Once a week [] Do Not Change Dressing   [] Other:     Negative Pressure:           Wound Location:   [] Pressure@           mm/Hg  []Continuous []Intermittent   [] Black  [] White Foam [] Other:   []Change dressing: []Three times per week    []Other:     Pressure

## 2025-03-10 NOTE — PROGRESS NOTES
Wound Center  Progress Note / Procedure Note      Chief Complaint:  Lorena Nichole is a 69 y.o.  female  with R lower leg anterior wound of >1 months duration.    Referred by:  [facility]      Assessment/Plan     69 y.o. female with adrenal insufficiency and diabetes mellitus.    -R lower leg chronic ulcer.  Full thickness  Smaller, more granular  Slough/granular  Necessitates debridement  for wound healing and to prevent/heal infection  Ulcer needs debridement- see note below  Continue same    -Leg edema/venous insufficiency/secondary lymphedema   Discussed:  Compression  Elevation  Light exercise      -Dm2  A1c 7.1 (2/11/25)      Following discussed with patient / son  Needs :  Serial debridement- prn    Good local wound care  Dressing: see discharge notes  Frequency : three times a week       Continue Home Health    -Edema management  Remove dressing prior to showering  Do not get dressing wet    Edema management:  Elevate leg(s) throughout the day starting in the morning  Compression   Avoid prolonged standing        -Nutrition optimization    -Good Diabetic control    -Good offloading    Patient/ understood and agrees with plan. Questions answered.      Follow up with me in 1 week          Subjective:   3/10 no new issues    HPI:     Fell, acquired laceration, sutured dehisced, became infected, required iv abx, referred here    History/Chart/Medications reviewed    Hospital Course ;   69 y.o.  female with past medical history significant for adrenal insufficiency chronically on prednisone, diabetes mellitus type 2 on metformin and glipizide, hypertension, sleep apnea, history of a stroke, history of diastolic congestive heart failure, history of COPD, cerebral AVM who presented with right leg swelling, redness and tenderness with an open ulcer.  Patient states that her condition started beginning of January when she fell due to her mobility problem resulting in right leg open wound that need multiple

## 2025-03-17 ENCOUNTER — HOSPITAL ENCOUNTER (OUTPATIENT)
Facility: HOSPITAL | Age: 70
Discharge: HOME | End: 2025-03-17
Payer: MEDICARE

## 2025-03-17 VITALS
DIASTOLIC BLOOD PRESSURE: 78 MMHG | HEART RATE: 98 BPM | RESPIRATION RATE: 18 BRPM | SYSTOLIC BLOOD PRESSURE: 110 MMHG | OXYGEN SATURATION: 100 % | TEMPERATURE: 97.9 F

## 2025-03-17 DIAGNOSIS — R60.0 LOWER EXTREMITY EDEMA: Primary | ICD-10-CM

## 2025-03-17 DIAGNOSIS — L97.915 ULCER OF RIGHT LOWER EXTREMITY WITH MUSCLE INVOLVEMENT WITHOUT EVIDENCE OF NECROSIS: ICD-10-CM

## 2025-03-17 DIAGNOSIS — I87.2 PERIPHERAL VENOUS INSUFFICIENCY: ICD-10-CM

## 2025-03-17 PROCEDURE — 11043 DBRDMT MUSC&/FSCA 1ST 20/<: CPT

## 2025-03-17 PROCEDURE — 6370000000 HC RX 637 (ALT 250 FOR IP): Performed by: FAMILY MEDICINE

## 2025-03-17 RX ORDER — LIDOCAINE HYDROCHLORIDE 20 MG/ML
JELLY TOPICAL PRN
OUTPATIENT
Start: 2025-03-17

## 2025-03-17 RX ORDER — SILVER SULFADIAZINE 10 MG/G
CREAM TOPICAL PRN
OUTPATIENT
Start: 2025-03-17

## 2025-03-17 RX ORDER — GINSENG 100 MG
CAPSULE ORAL PRN
OUTPATIENT
Start: 2025-03-17

## 2025-03-17 RX ORDER — LIDOCAINE 50 MG/G
OINTMENT TOPICAL PRN
OUTPATIENT
Start: 2025-03-17

## 2025-03-17 RX ORDER — BACITRACIN ZINC AND POLYMYXIN B SULFATE 500; 1000 [USP'U]/G; [USP'U]/G
OINTMENT TOPICAL PRN
OUTPATIENT
Start: 2025-03-17

## 2025-03-17 RX ORDER — GENTAMICIN SULFATE 1 MG/G
OINTMENT TOPICAL PRN
OUTPATIENT
Start: 2025-03-17

## 2025-03-17 RX ORDER — MUPIROCIN 20 MG/G
OINTMENT TOPICAL PRN
OUTPATIENT
Start: 2025-03-17

## 2025-03-17 RX ORDER — TRIAMCINOLONE ACETONIDE 1 MG/G
OINTMENT TOPICAL PRN
OUTPATIENT
Start: 2025-03-17

## 2025-03-17 RX ORDER — NEOMYCIN/BACITRACIN/POLYMYXINB 3.5-400-5K
OINTMENT (GRAM) TOPICAL PRN
OUTPATIENT
Start: 2025-03-17

## 2025-03-17 RX ORDER — CLOBETASOL PROPIONATE 0.5 MG/G
OINTMENT TOPICAL PRN
OUTPATIENT
Start: 2025-03-17

## 2025-03-17 RX ORDER — LIDOCAINE HYDROCHLORIDE 20 MG/ML
JELLY TOPICAL PRN
Status: DISCONTINUED | OUTPATIENT
Start: 2025-03-17 | End: 2025-03-18 | Stop reason: HOSPADM

## 2025-03-17 RX ORDER — LIDOCAINE HYDROCHLORIDE 40 MG/ML
SOLUTION TOPICAL PRN
OUTPATIENT
Start: 2025-03-17

## 2025-03-17 RX ORDER — LIDOCAINE 40 MG/G
CREAM TOPICAL PRN
OUTPATIENT
Start: 2025-03-17

## 2025-03-17 RX ORDER — BETAMETHASONE DIPROPIONATE 0.5 MG/G
CREAM TOPICAL PRN
OUTPATIENT
Start: 2025-03-17

## 2025-03-17 RX ORDER — SODIUM CHLOR/HYPOCHLOROUS ACID 0.033 %
SOLUTION, IRRIGATION IRRIGATION PRN
OUTPATIENT
Start: 2025-03-17

## 2025-03-17 RX ADMIN — LIDOCAINE HYDROCHLORIDE: 20 JELLY TOPICAL at 09:30

## 2025-03-17 NOTE — PLAN OF CARE
Discharge Instructions from  Wound Care Clinic at [facility]   [address]   [address]  [place] [address] [phone] Fax [phone]     NAME:  Lorena Nichole  YOB: 1955  MEDICAL RECORD NUMBER:  [id number]  DATE:  03/17/2025     Wound Cleansing:   Do not scrub or use excessive force.  Cleanse wound prior to applying a clean dressing with:  [] Normal Saline        [] Keep Wound Dry in Shower    [x] Wound Cleanser   [] Cleanse wound with Mild Soap & Water  [] May Shower at Discharge   [] Other:       Dressings:                  Wound Location: Right shin            [x] Apply Primary Dressing:                                          [] MediHoney Gel      [] Alginate with Silver            [] Alginate              [x] Collagen     [] Collagen with Silver   [] Santyl with Moisten saline gauze                [] Hydrocolloid             [] MediHoney Alginate        [] Foam with Silver              [] Foam   [x] Hydrofera Blue Transfer   [x] Other:  Alginate, superabsorbent dressing and zinc 2 layer wrap             [x] Change dressing:  [] Daily           [] Every Other Day    [] Three times per week              [] Once a week          [] Do Not Change Dressing    [x] Other: Mondays , Wednesday and Friday by  until pt returns to clinic 4/7/25               Return Appointment:  [] Wound and dressing supply provider:   [] ECF or Home Healthcare: [facility]  [] Wound Assessment:         [] Physician or NP scheduled for Wound Assessment:   [x] Return Appointment: With MD  in  3 Week(s) (4/7/25)  [] Ordered tests:       Electronically signed Nicole HERNANDEZN RN CWCN CMSRN on 3/17/2025 at 4:14 PM      Wound Care Center Information: Should you experience any significant changes in your wound(s) or have questions about your wound care, please contact the [facility]patient [facility]ound Center at MONDAY - FRIDAY 8:00 am - 4:30.  If you need help with your wound outside these hours and

## 2025-03-17 NOTE — FLOWSHEET NOTE
03/17/25 0924   Wound 02/10/25 Pretibial Right   Date First Assessed/Time First Assessed: 02/10/25 1103   Present on Original Admission: Yes  Location: Pretibial  Wound Location Orientation: Right   Wound Image     Wound Etiology Traumatic   Dressing Status New dressing applied   Wound Cleansed Wound cleanser   Dressing/Treatment Collagen;Hydrofera blue   Offloading for Diabetic Foot Ulcers Offloading not required   Dressing Change Due 04/07/25   Wound Length (cm) 1.5 cm   Wound Width (cm) 2 cm   Wound Depth (cm) 0.5 cm   Wound Surface Area (cm^2) 3 cm^2   Change in Wound Size % (l*w) 83.33   Wound Volume (cm^3) 1.5 cm^3   Wound Healing % 72   Post-Procedure Length (cm) 1.5 cm   Post-Procedure Width (cm) 2 cm   Post-Procedure Depth (cm) 0.7 cm   Post-Procedure Surface Area (cm^2) 3 cm^2   Post-Procedure Volume (cm^3) 2.1 cm^3   Wound Assessment Slough;Granulation tissue   Drainage Amount Moderate (25-50%)   Drainage Description Serosanguinous   Odor None   Sneha-wound Assessment Blanchable erythema;Warm   Margins Defined edges   Wound Thickness Description not for Pressure Injury Full thickness   Sourav Scale   Sensory Perceptions 4   Moisture 4   Activity 4   Mobility 3   Nutrition 4   Friction and Shear 3   Sourav Scale Score 22   Wound Follow Up   Require Follow Up Yes     Multilayer Compression Wrap   (Not Unna) Below the Knee    NAME:  Lorena Nichole  YOB: 1955  MEDICAL RECORD NUMBER:  [id number]  DATE:  3/17/2025    Multilayer compression wrap: Removed old Multilayer wrap if indicated and wash leg with mild soap/water.  Applied moisturizing agent to dry skin as needed.   Applied primary and secondary dressing as ordered.  Applied multilayered dressing below the knee to right lower leg.  Instructed patient/caregiver not to remove dressing and to keep it clean and dry.   Instructed patient/caregiver on complications to report to provider, such as pain, numbness in toes, heavy drainage, and

## 2025-03-17 NOTE — PROGRESS NOTES
Surface Area (cm^2) 3 cm^2 03/17/25 0924   Change in Wound Size % (l*w) 83.33 03/17/25 0924   Wound Volume (cm^3) 1.5 cm^3 03/17/25 0924   Wound Healing % 72 03/17/25 0924   Post-Procedure Length (cm) 1.5 cm 03/17/25 0924   Post-Procedure Width (cm) 2 cm 03/17/25 0924   Post-Procedure Depth (cm) 0.7 cm 03/17/25 0924   Post-Procedure Surface Area (cm^2) 3 cm^2 03/17/25 0924   Post-Procedure Volume (cm^3) 2.1 cm^3 03/17/25 0924   Wound Assessment Slough;Granulation tissue 03/17/25 0924   Drainage Amount Moderate (25-50%) 03/17/25 0924   Drainage Description Serosanguinous 03/17/25 0924   Odor None 03/17/25 0924   Sneha-wound Assessment Blanchable erythema;Warm 03/17/25 0924   Margins Defined edges 03/17/25 0924   Wound Thickness Description not for Pressure Injury Full thickness 03/17/25 0924   Number of days: 34          -------    Past Medical History:   Diagnosis Date    Anticoagulated     due to PVD    Arrhythmia     sees Dr. Gonzales - uses Beta Blockers for rate control    Asthma     Chronic back pain     Eczema     GERD (gastroesophageal reflux disease)     Hypercholesterolemia     Hypertension     Insufficiency, adrenal     pt states due to multiple steroid injection for back pain and eczema    Pre-diabetes     last HgbA1C (early 2017) was under 7.0    PVD (peripheral vascular disease)     hx of carotid disease    Severe obesity     Severe obesity (BMI 35.0-39.9) with comorbidity     Sleep apnea     does not use a device    TIA (transient ischemic attack)     X 2 related to carotid disease     Past Surgical History:   Procedure Laterality Date    APPENDECTOMY      BLADDER SUSPENSION      CAROTID ENDARTERECTOMY  07/2012    left    CERVICAL FUSION      CHOLECYSTECTOMY, LAPAROSCOPIC      HEMORRHOID SURGERY      HYSTERECTOMY, TOTAL ABDOMINAL (CERVIX REMOVED)      LUMBAR FUSION      X 2    ORTHOPEDIC SURGERY      tendon repair foot    ORTHOPEDIC SURGERY      hammer toe     Family History   Problem Relation Age of

## 2025-03-21 ENCOUNTER — APPOINTMENT (OUTPATIENT)
Facility: HOSPITAL | Age: 70
DRG: 552 | End: 2025-03-21
Payer: MEDICARE

## 2025-03-21 ENCOUNTER — HOSPITAL ENCOUNTER (INPATIENT)
Facility: HOSPITAL | Age: 70
LOS: 4 days | Discharge: NURSING, REHAB OR LONG-TERM CARE | DRG: 552 | End: 2025-03-25
Attending: INTERNAL MEDICINE | Admitting: INTERNAL MEDICINE
Payer: MEDICARE

## 2025-03-21 DIAGNOSIS — R29.6 FREQUENT FALLS: ICD-10-CM

## 2025-03-21 DIAGNOSIS — R52 INTRACTABLE PAIN: ICD-10-CM

## 2025-03-21 DIAGNOSIS — Z79.01 ANTICOAGULATED: ICD-10-CM

## 2025-03-21 DIAGNOSIS — G89.29 OTHER CHRONIC PAIN: ICD-10-CM

## 2025-03-21 DIAGNOSIS — I73.9 PVD (PERIPHERAL VASCULAR DISEASE): ICD-10-CM

## 2025-03-21 DIAGNOSIS — R52 INTRACTABLE PAIN: Primary | ICD-10-CM

## 2025-03-21 DIAGNOSIS — R53.81 PHYSICAL DECONDITIONING: ICD-10-CM

## 2025-03-21 DIAGNOSIS — I10 ESSENTIAL HYPERTENSION: ICD-10-CM

## 2025-03-21 PROBLEM — I50.32 CHRONIC DIASTOLIC (CONGESTIVE) HEART FAILURE: Chronic | Status: ACTIVE | Noted: 2025-02-10

## 2025-03-21 PROBLEM — E11.9 DIABETES MELLITUS, TYPE 2 (HCC): Chronic | Status: ACTIVE | Noted: 2025-02-10

## 2025-03-21 PROBLEM — Z86.718 HISTORY OF DVT (DEEP VEIN THROMBOSIS): Chronic | Status: ACTIVE | Noted: 2025-02-10

## 2025-03-21 PROBLEM — J44.9 COPD (CHRONIC OBSTRUCTIVE PULMONARY DISEASE) (HCC): Chronic | Status: ACTIVE | Noted: 2025-02-10

## 2025-03-21 PROBLEM — R26.2 AMBULATORY DYSFUNCTION: Status: ACTIVE | Noted: 2025-03-21

## 2025-03-21 PROBLEM — Z86.73 HISTORY OF STROKE: Chronic | Status: ACTIVE | Noted: 2025-02-10

## 2025-03-21 PROBLEM — Q28.2 CEREBRAL AVM: Chronic | Status: ACTIVE | Noted: 2025-02-10

## 2025-03-21 LAB
ALBUMIN SERPL-MCNC: 3.6 G/DL (ref 3.4–5)
ALBUMIN/GLOB SERPL: 0.9 (ref 0.8–1.7)
ALP SERPL-CCNC: 99 U/L (ref 45–117)
ALT SERPL-CCNC: 37 U/L (ref 13–56)
ANION GAP SERPL CALC-SCNC: 6 MMOL/L (ref 3–18)
APPEARANCE UR: CLEAR
AST SERPL-CCNC: 24 U/L (ref 10–38)
BACTERIA URNS QL MICRO: ABNORMAL /HPF
BASOPHILS # BLD: 0.03 K/UL (ref 0–0.1)
BASOPHILS NFR BLD: 0.4 % (ref 0–2)
BILIRUB SERPL-MCNC: 0.4 MG/DL (ref 0.2–1)
BILIRUB UR QL: NEGATIVE
BUN SERPL-MCNC: 10 MG/DL (ref 7–18)
BUN/CREAT SERPL: 10 (ref 12–20)
CALCIUM SERPL-MCNC: 8.7 MG/DL (ref 8.5–10.1)
CHLORIDE SERPL-SCNC: 105 MMOL/L (ref 100–111)
CO2 SERPL-SCNC: 29 MMOL/L (ref 21–32)
COLOR UR: YELLOW
CREAT SERPL-MCNC: 0.96 MG/DL (ref 0.6–1.3)
DIFFERENTIAL METHOD BLD: ABNORMAL
EOSINOPHIL # BLD: 0.13 K/UL (ref 0–0.4)
EOSINOPHIL NFR BLD: 1.5 % (ref 0–5)
EPITH CASTS URNS QL MICRO: ABNORMAL /LPF (ref 0–5)
ERYTHROCYTE [DISTWIDTH] IN BLOOD BY AUTOMATED COUNT: 13.4 % (ref 11.6–14.5)
GLOBULIN SER CALC-MCNC: 3.8 G/DL (ref 2–4)
GLUCOSE BLD STRIP.AUTO-MCNC: 204 MG/DL (ref 70–110)
GLUCOSE SERPL-MCNC: 186 MG/DL (ref 74–99)
GLUCOSE UR STRIP.AUTO-MCNC: NEGATIVE MG/DL
HCT VFR BLD AUTO: 42.5 % (ref 35–45)
HGB BLD-MCNC: 13.7 G/DL (ref 12–16)
HGB UR QL STRIP: NEGATIVE
IMM GRANULOCYTES # BLD AUTO: 0.04 K/UL (ref 0–0.04)
IMM GRANULOCYTES NFR BLD AUTO: 0.5 % (ref 0–0.5)
KETONES UR QL STRIP.AUTO: NEGATIVE MG/DL
LEUKOCYTE ESTERASE UR QL STRIP.AUTO: ABNORMAL
LYMPHOCYTES # BLD: 1.71 K/UL (ref 0.9–3.3)
LYMPHOCYTES NFR BLD: 20.2 % (ref 21–52)
MAGNESIUM SERPL-MCNC: 2 MG/DL (ref 1.6–2.6)
MCH RBC QN AUTO: 30.1 PG (ref 24–34)
MCHC RBC AUTO-ENTMCNC: 32.2 G/DL (ref 31–37)
MCV RBC AUTO: 93.4 FL (ref 78–100)
MONOCYTES # BLD: 0.65 K/UL (ref 0.05–1.2)
MONOCYTES NFR BLD: 7.7 % (ref 3–10)
NEUTS SEG # BLD: 5.9 K/UL (ref 1.8–8)
NEUTS SEG NFR BLD: 69.7 % (ref 40–73)
NITRITE UR QL STRIP.AUTO: NEGATIVE
NRBC # BLD: 0 K/UL (ref 0–0.01)
NRBC BLD-RTO: 0 PER 100 WBC
PH UR STRIP: 7.5 (ref 5–8)
PLATELET # BLD AUTO: 303 K/UL (ref 135–420)
PMV BLD AUTO: 9.8 FL (ref 9.2–11.8)
POTASSIUM SERPL-SCNC: 3.7 MMOL/L (ref 3.5–5.5)
PROT SERPL-MCNC: 7.4 G/DL (ref 6.4–8.2)
PROT UR STRIP-MCNC: NEGATIVE MG/DL
RBC # BLD AUTO: 4.55 M/UL (ref 4.2–5.3)
RBC #/AREA URNS HPF: ABNORMAL /HPF (ref 0–5)
SODIUM SERPL-SCNC: 140 MMOL/L (ref 136–145)
SP GR UR REFRACTOMETRY: 1.01 (ref 1–1.03)
TROPONIN I SERPL HS-MCNC: 4 NG/L (ref 0–54)
TSH SERPL DL<=0.05 MIU/L-ACNC: 1.95 UIU/ML (ref 0.36–3.74)
UROBILINOGEN UR QL STRIP.AUTO: 0.2 EU/DL (ref 0.2–1)
WBC # BLD AUTO: 8.5 K/UL (ref 4.6–13.2)
WBC URNS QL MICRO: ABNORMAL /HPF (ref 0–5)

## 2025-03-21 PROCEDURE — 97162 PT EVAL MOD COMPLEX 30 MIN: CPT

## 2025-03-21 PROCEDURE — 6370000000 HC RX 637 (ALT 250 FOR IP): Performed by: PHYSICIAN ASSISTANT

## 2025-03-21 PROCEDURE — 71045 X-RAY EXAM CHEST 1 VIEW: CPT

## 2025-03-21 PROCEDURE — 83735 ASSAY OF MAGNESIUM: CPT

## 2025-03-21 PROCEDURE — 84484 ASSAY OF TROPONIN QUANT: CPT

## 2025-03-21 PROCEDURE — 6370000000 HC RX 637 (ALT 250 FOR IP): Performed by: INTERNAL MEDICINE

## 2025-03-21 PROCEDURE — 73630 X-RAY EXAM OF FOOT: CPT

## 2025-03-21 PROCEDURE — 6360000002 HC RX W HCPCS: Performed by: PHYSICIAN ASSISTANT

## 2025-03-21 PROCEDURE — 85025 COMPLETE CBC W/AUTO DIFF WBC: CPT

## 2025-03-21 PROCEDURE — 70450 CT HEAD/BRAIN W/O DYE: CPT

## 2025-03-21 PROCEDURE — 2500000003 HC RX 250 WO HCPCS: Performed by: INTERNAL MEDICINE

## 2025-03-21 PROCEDURE — 97116 GAIT TRAINING THERAPY: CPT

## 2025-03-21 PROCEDURE — 99285 EMERGENCY DEPT VISIT HI MDM: CPT

## 2025-03-21 PROCEDURE — 93005 ELECTROCARDIOGRAM TRACING: CPT | Performed by: PHYSICIAN ASSISTANT

## 2025-03-21 PROCEDURE — 80053 COMPREHEN METABOLIC PANEL: CPT

## 2025-03-21 PROCEDURE — 82962 GLUCOSE BLOOD TEST: CPT

## 2025-03-21 PROCEDURE — 84443 ASSAY THYROID STIM HORMONE: CPT

## 2025-03-21 PROCEDURE — 81001 URINALYSIS AUTO W/SCOPE: CPT

## 2025-03-21 PROCEDURE — 97535 SELF CARE MNGMENT TRAINING: CPT

## 2025-03-21 PROCEDURE — 73590 X-RAY EXAM OF LOWER LEG: CPT

## 2025-03-21 PROCEDURE — 1100000000 HC RM PRIVATE

## 2025-03-21 PROCEDURE — 97166 OT EVAL MOD COMPLEX 45 MIN: CPT

## 2025-03-21 RX ORDER — DEXTROSE MONOHYDRATE 100 MG/ML
INJECTION, SOLUTION INTRAVENOUS CONTINUOUS PRN
Status: DISCONTINUED | OUTPATIENT
Start: 2025-03-21 | End: 2025-03-25 | Stop reason: HOSPADM

## 2025-03-21 RX ORDER — MORPHINE SULFATE 2 MG/ML
2 INJECTION, SOLUTION INTRAMUSCULAR; INTRAVENOUS
Refills: 0 | Status: COMPLETED | OUTPATIENT
Start: 2025-03-21 | End: 2025-03-22

## 2025-03-21 RX ORDER — INSULIN LISPRO 100 [IU]/ML
0-4 INJECTION, SOLUTION INTRAVENOUS; SUBCUTANEOUS
Status: DISCONTINUED | OUTPATIENT
Start: 2025-03-21 | End: 2025-03-25 | Stop reason: HOSPADM

## 2025-03-21 RX ORDER — BISACODYL 10 MG
10 SUPPOSITORY, RECTAL RECTAL DAILY PRN
Status: DISCONTINUED | OUTPATIENT
Start: 2025-03-21 | End: 2025-03-25 | Stop reason: HOSPADM

## 2025-03-21 RX ORDER — GLUCAGON 1 MG/ML
1 KIT INJECTION PRN
Status: DISCONTINUED | OUTPATIENT
Start: 2025-03-21 | End: 2025-03-25 | Stop reason: HOSPADM

## 2025-03-21 RX ORDER — BISACODYL 5 MG/1
5 TABLET, DELAYED RELEASE ORAL DAILY
Status: DISCONTINUED | OUTPATIENT
Start: 2025-03-21 | End: 2025-03-25 | Stop reason: HOSPADM

## 2025-03-21 RX ORDER — ONDANSETRON 4 MG/1
4 TABLET, ORALLY DISINTEGRATING ORAL EVERY 8 HOURS PRN
Status: DISCONTINUED | OUTPATIENT
Start: 2025-03-21 | End: 2025-03-25 | Stop reason: HOSPADM

## 2025-03-21 RX ORDER — MORPHINE SULFATE 2 MG/ML
1 INJECTION, SOLUTION INTRAMUSCULAR; INTRAVENOUS
Status: DISCONTINUED | OUTPATIENT
Start: 2025-03-21 | End: 2025-03-21

## 2025-03-21 RX ORDER — ACETAMINOPHEN 325 MG/1
650 TABLET ORAL EVERY 6 HOURS PRN
Status: DISCONTINUED | OUTPATIENT
Start: 2025-03-21 | End: 2025-03-25 | Stop reason: HOSPADM

## 2025-03-21 RX ORDER — ACETAMINOPHEN 650 MG/1
650 SUPPOSITORY RECTAL EVERY 6 HOURS PRN
Status: DISCONTINUED | OUTPATIENT
Start: 2025-03-21 | End: 2025-03-25 | Stop reason: HOSPADM

## 2025-03-21 RX ORDER — ENOXAPARIN SODIUM 100 MG/ML
30 INJECTION SUBCUTANEOUS 2 TIMES DAILY
Status: DISCONTINUED | OUTPATIENT
Start: 2025-03-22 | End: 2025-03-21

## 2025-03-21 RX ORDER — MORPHINE SULFATE 2 MG/ML
1 INJECTION, SOLUTION INTRAMUSCULAR; INTRAVENOUS
Status: COMPLETED | OUTPATIENT
Start: 2025-03-21 | End: 2025-03-21

## 2025-03-21 RX ORDER — SODIUM CHLORIDE 9 MG/ML
INJECTION, SOLUTION INTRAVENOUS PRN
Status: DISCONTINUED | OUTPATIENT
Start: 2025-03-21 | End: 2025-03-25 | Stop reason: HOSPADM

## 2025-03-21 RX ORDER — SODIUM CHLORIDE 0.9 % (FLUSH) 0.9 %
5-40 SYRINGE (ML) INJECTION EVERY 12 HOURS SCHEDULED
Status: DISCONTINUED | OUTPATIENT
Start: 2025-03-21 | End: 2025-03-25 | Stop reason: HOSPADM

## 2025-03-21 RX ORDER — KETOROLAC TROMETHAMINE 15 MG/ML
15 INJECTION, SOLUTION INTRAMUSCULAR; INTRAVENOUS ONCE
Status: DISCONTINUED | OUTPATIENT
Start: 2025-03-21 | End: 2025-03-21

## 2025-03-21 RX ORDER — ONDANSETRON 2 MG/ML
4 INJECTION INTRAMUSCULAR; INTRAVENOUS EVERY 6 HOURS PRN
Status: DISCONTINUED | OUTPATIENT
Start: 2025-03-21 | End: 2025-03-25 | Stop reason: HOSPADM

## 2025-03-21 RX ORDER — SODIUM CHLORIDE 0.9 % (FLUSH) 0.9 %
5-40 SYRINGE (ML) INJECTION PRN
Status: DISCONTINUED | OUTPATIENT
Start: 2025-03-21 | End: 2025-03-25 | Stop reason: HOSPADM

## 2025-03-21 RX ORDER — ACETAMINOPHEN 500 MG
1000 TABLET ORAL
Status: COMPLETED | OUTPATIENT
Start: 2025-03-21 | End: 2025-03-21

## 2025-03-21 RX ORDER — SENNOSIDES A AND B 8.6 MG/1
1 TABLET, FILM COATED ORAL DAILY PRN
Status: DISCONTINUED | OUTPATIENT
Start: 2025-03-21 | End: 2025-03-25 | Stop reason: HOSPADM

## 2025-03-21 RX ADMIN — SODIUM CHLORIDE, PRESERVATIVE FREE 10 ML: 5 INJECTION INTRAVENOUS at 21:10

## 2025-03-21 RX ADMIN — ACETAMINOPHEN 1000 MG: 500 TABLET ORAL at 16:19

## 2025-03-21 RX ADMIN — INSULIN LISPRO 1 UNITS: 100 INJECTION, SOLUTION INTRAVENOUS; SUBCUTANEOUS at 23:46

## 2025-03-21 RX ADMIN — MORPHINE SULFATE 1 MG: 2 INJECTION, SOLUTION INTRAMUSCULAR; INTRAVENOUS at 21:06

## 2025-03-21 ASSESSMENT — PAIN SCALES - GENERAL
PAINLEVEL_OUTOF10: 7
PAINLEVEL_OUTOF10: 8
PAINLEVEL_OUTOF10: 7
PAINLEVEL_OUTOF10: 0

## 2025-03-21 ASSESSMENT — PAIN DESCRIPTION - LOCATION
LOCATION: ANKLE;FOOT
LOCATION: LEG
LOCATION: FOOT

## 2025-03-21 ASSESSMENT — PAIN DESCRIPTION - ORIENTATION: ORIENTATION: LEFT

## 2025-03-21 ASSESSMENT — PAIN - FUNCTIONAL ASSESSMENT: PAIN_FUNCTIONAL_ASSESSMENT: 0-10

## 2025-03-21 NOTE — ED PROVIDER NOTES
Negative NEG      Leukocyte Esterase, Urine SMALL (A) NEG     Urinalysis, Micro    Collection Time: 03/21/25  5:17 PM   Result Value Ref Range    WBC, UA 0-3 0 - 5 /hpf    RBC, UA 0-3 0 - 5 /hpf    Epithelial Cells, UA 2+ 0 - 5 /lpf    BACTERIA, URINE FEW (A) NEG /hpf       EKG: When ordered, EKG's are interpreted by the Emergency Department Provider in the absence of a cardiologist.  Please see their note for interpretation of EKG.      RADIOLOGY:  Non-plain film images such as CT, Ultrasound and MRI are read by the radiologist. Plain radiographic images are visualized and preliminarily interpreted by the ED.  Interpretation per the Radiologist below, if available at the time of this note:  XR TIBIA FIBULA LEFT (2 VIEWS)   Final Result   No acute bony abnormalities.         Electronically signed by CARTER Otilia      XR FOOT LEFT (MIN 3 VIEWS)   Final Result   No acute bony abnormalities.         Electronically signed by CARTER Otilia      XR CHEST PORTABLE   Final Result   No acute cardiopulmonary disease.         Electronically signed by Osorio Luna MD      CT HEAD WO CONTRAST   Final Result   No acute intracranial abnormality.            Electronically signed by Humberto العراقي MD          Medications given in the ED-  Medications   morphine (PF) injection 1 mg (has no administration in time range)   morphine (PF) injection 2 mg (has no administration in time range)   acetaminophen (TYLENOL) tablet 1,000 mg (1,000 mg Oral Given 3/21/25 1619)       Please note that this dictation was completed with LEHR, the computer voice recognition software.  Quite often unanticipated grammatical, syntax, homophones, and other interpretive errors are inadvertently transcribed by the computer software.  Please disregard these errors.  Please excuse any errors that have escaped final proofreading.       Eugenia Shabazz PA-C  03/21/25 1942       Eugenia Shabazz PA-C  03/21/25 1953

## 2025-03-21 NOTE — ED TRIAGE NOTES
Pt comes via EMS with c/o weakness and falls over the past month. L leg gave out and has pain from bending under her.     Pt is very anxious    Pt was supposed to be using walker at time of fall     Per pt this is fall number 7

## 2025-03-21 NOTE — H&P
History & Physical    Patient: Lorena Nichole MRN: [id number]  [facility]: [id number]    YOB: 1955  Age: 69 y.o.  Sex: female      DOA: 3/21/2025    Chief Complaint:   Chief Complaint   Patient presents with    Fall    Foot Injury       Active Hospital Problems    Diagnosis Date Noted    Intractable pain and physical deconditioning [R52] 03/21/2025     Priority: High    Ambulatory dysfunction [R26.2] 03/21/2025     Priority: Medium    Physical deconditioning [R53.81] 03/21/2025     Priority: Medium    Cerebral AVM [Q28.2] 02/10/2025    Chronic diastolic (congestive) heart failure (HCC) [I50.32] 02/10/2025    COPD (chronic obstructive pulmonary disease) (HCC) [J44.9] 02/10/2025    Diabetes mellitus, type 2 (HCC) [E11.9] 02/10/2025    History of DVT (deep vein thrombosis) [Z86.718] 02/10/2025    History of stroke [Z86.73] 02/10/2025    Anticoagulated [Z79.01]     Chronic back pain [M54.9, G89.29]     Eczema [L30.9]     GERD (gastroesophageal reflux disease) [K21.9]     Hypercholesterolemia [E78.00]     Hypertension [I10]     Insufficiency, adrenal [E27.40]     PVD (peripheral vascular disease) [I73.9]     Severe obesity [E66.01]     Sleep apnea [G47.30]        HPI:     Lorena Nichole is a 69 y.o.  female w/ PMH ofhypertension, hyperlipidemia, diabetes, previous TIA, CHF, PVD, GERD, adrenal insufficiency who presents with recurrent falls and out of control pain.    Ms. Nichole presents with c/o physically to weak to ambulate starting 1 week prior. In fact she has been weak and having multiple falls over past 2 weeks and increasing in frequency for past 6 months. She  has had 7 falls since January. Although she has never been severely injured, patient is at high risk for bleeding complication d/t chronic anticoagulation.    Social Hx: Lives independently with  who assists with ADLs and is surrogate. Pt denies current smoking, illicit drug use and ETOH use.    FHx: Paternal, Ca; Maternal, Ca.

## 2025-03-22 LAB
ANION GAP SERPL CALC-SCNC: 5 MMOL/L (ref 3–18)
BUN SERPL-MCNC: 11 MG/DL (ref 7–18)
BUN/CREAT SERPL: 16 (ref 12–20)
CA-I SERPL-SCNC: 1.12 MMOL/L (ref 1.12–1.32)
CALCIUM SERPL-MCNC: 8.6 MG/DL (ref 8.5–10.1)
CHLORIDE SERPL-SCNC: 103 MMOL/L (ref 100–111)
CK SERPL-CCNC: 111 U/L (ref 26–192)
CO2 SERPL-SCNC: 29 MMOL/L (ref 21–32)
CORTIS AM PEAK SERPL-MCNC: 1.2 UG/DL (ref 4.3–22.45)
CREAT SERPL-MCNC: 0.7 MG/DL (ref 0.6–1.3)
ERYTHROCYTE [DISTWIDTH] IN BLOOD BY AUTOMATED COUNT: 13.7 % (ref 11.6–14.5)
EST. AVERAGE GLUCOSE BLD GHB EST-MCNC: 166 MG/DL
GLUCOSE BLD STRIP.AUTO-MCNC: 145 MG/DL (ref 70–110)
GLUCOSE BLD STRIP.AUTO-MCNC: 225 MG/DL (ref 70–110)
GLUCOSE BLD STRIP.AUTO-MCNC: 241 MG/DL (ref 70–110)
GLUCOSE BLD STRIP.AUTO-MCNC: 246 MG/DL (ref 70–110)
GLUCOSE SERPL-MCNC: 205 MG/DL (ref 74–99)
HBA1C MFR BLD: 7.4 % (ref 4.2–5.6)
HCT VFR BLD AUTO: 37 % (ref 35–45)
HGB BLD-MCNC: 11.9 G/DL (ref 12–16)
MAGNESIUM SERPL-MCNC: 1.9 MG/DL (ref 1.6–2.6)
MCH RBC QN AUTO: 30.6 PG (ref 24–34)
MCHC RBC AUTO-ENTMCNC: 32.2 G/DL (ref 31–37)
MCV RBC AUTO: 95.1 FL (ref 78–100)
NRBC # BLD: 0 K/UL (ref 0–0.01)
NRBC BLD-RTO: 0 PER 100 WBC
PHOSPHATE SERPL-MCNC: 3.6 MG/DL (ref 2.5–4.9)
PLATELET # BLD AUTO: 273 K/UL (ref 135–420)
PMV BLD AUTO: 10.2 FL (ref 9.2–11.8)
POTASSIUM SERPL-SCNC: 4 MMOL/L (ref 3.5–5.5)
RBC # BLD AUTO: 3.89 M/UL (ref 4.2–5.3)
SODIUM SERPL-SCNC: 137 MMOL/L (ref 136–145)
WBC # BLD AUTO: 6.1 K/UL (ref 4.6–13.2)

## 2025-03-22 PROCEDURE — 1100000000 HC RM PRIVATE

## 2025-03-22 PROCEDURE — 6360000002 HC RX W HCPCS: Performed by: INTERNAL MEDICINE

## 2025-03-22 PROCEDURE — 80048 BASIC METABOLIC PNL TOTAL CA: CPT

## 2025-03-22 PROCEDURE — 6370000000 HC RX 637 (ALT 250 FOR IP): Performed by: INTERNAL MEDICINE

## 2025-03-22 PROCEDURE — 82607 VITAMIN B-12: CPT

## 2025-03-22 PROCEDURE — 2500000003 HC RX 250 WO HCPCS: Performed by: INTERNAL MEDICINE

## 2025-03-22 PROCEDURE — 82330 ASSAY OF CALCIUM: CPT

## 2025-03-22 PROCEDURE — 6360000002 HC RX W HCPCS: Performed by: PHYSICIAN ASSISTANT

## 2025-03-22 PROCEDURE — 6370000000 HC RX 637 (ALT 250 FOR IP): Performed by: HOSPITALIST

## 2025-03-22 PROCEDURE — 83036 HEMOGLOBIN GLYCOSYLATED A1C: CPT

## 2025-03-22 PROCEDURE — 2700000000 HC OXYGEN THERAPY PER DAY

## 2025-03-22 PROCEDURE — 85027 COMPLETE CBC AUTOMATED: CPT

## 2025-03-22 PROCEDURE — 82533 TOTAL CORTISOL: CPT

## 2025-03-22 PROCEDURE — 97116 GAIT TRAINING THERAPY: CPT

## 2025-03-22 PROCEDURE — 84100 ASSAY OF PHOSPHORUS: CPT

## 2025-03-22 PROCEDURE — 36415 COLL VENOUS BLD VENIPUNCTURE: CPT

## 2025-03-22 PROCEDURE — 94640 AIRWAY INHALATION TREATMENT: CPT

## 2025-03-22 PROCEDURE — 82550 ASSAY OF CK (CPK): CPT

## 2025-03-22 PROCEDURE — 82962 GLUCOSE BLOOD TEST: CPT

## 2025-03-22 PROCEDURE — 83735 ASSAY OF MAGNESIUM: CPT

## 2025-03-22 RX ORDER — ATORVASTATIN CALCIUM 10 MG/1
10 TABLET, FILM COATED ORAL NIGHTLY
Status: DISCONTINUED | OUTPATIENT
Start: 2025-03-22 | End: 2025-03-25 | Stop reason: HOSPADM

## 2025-03-22 RX ORDER — MORPHINE SULFATE 2 MG/ML
2 INJECTION, SOLUTION INTRAMUSCULAR; INTRAVENOUS
Status: DISCONTINUED | OUTPATIENT
Start: 2025-03-22 | End: 2025-03-23

## 2025-03-22 RX ORDER — HYDROXYZINE HYDROCHLORIDE 25 MG/1
100 TABLET, FILM COATED ORAL NIGHTLY
Status: DISCONTINUED | OUTPATIENT
Start: 2025-03-22 | End: 2025-03-25 | Stop reason: HOSPADM

## 2025-03-22 RX ORDER — ALBUTEROL SULFATE 0.83 MG/ML
2.5 SOLUTION RESPIRATORY (INHALATION)
Status: DISCONTINUED | OUTPATIENT
Start: 2025-03-22 | End: 2025-03-22

## 2025-03-22 RX ORDER — ARFORMOTEROL TARTRATE 15 UG/2ML
15 SOLUTION RESPIRATORY (INHALATION)
Status: DISCONTINUED | OUTPATIENT
Start: 2025-03-22 | End: 2025-03-22

## 2025-03-22 RX ORDER — DONEPEZIL HYDROCHLORIDE 5 MG/1
10 TABLET, FILM COATED ORAL DAILY
Status: DISCONTINUED | OUTPATIENT
Start: 2025-03-22 | End: 2025-03-25 | Stop reason: HOSPADM

## 2025-03-22 RX ORDER — PREGABALIN 100 MG/1
200 CAPSULE ORAL 2 TIMES DAILY
Status: DISCONTINUED | OUTPATIENT
Start: 2025-03-22 | End: 2025-03-25 | Stop reason: HOSPADM

## 2025-03-22 RX ORDER — TROSPIUM CHLORIDE 20 MG/1
20 TABLET, FILM COATED ORAL
Status: DISCONTINUED | OUTPATIENT
Start: 2025-03-22 | End: 2025-03-25 | Stop reason: HOSPADM

## 2025-03-22 RX ORDER — FUROSEMIDE 40 MG/1
40 TABLET ORAL DAILY
Status: DISCONTINUED | OUTPATIENT
Start: 2025-03-22 | End: 2025-03-25 | Stop reason: HOSPADM

## 2025-03-22 RX ORDER — BUDESONIDE 0.5 MG/2ML
1 INHALANT ORAL
Status: DISCONTINUED | OUTPATIENT
Start: 2025-03-22 | End: 2025-03-25 | Stop reason: HOSPADM

## 2025-03-22 RX ORDER — MORPHINE SULFATE 2 MG/ML
1 INJECTION, SOLUTION INTRAMUSCULAR; INTRAVENOUS
Status: DISCONTINUED | OUTPATIENT
Start: 2025-03-22 | End: 2025-03-25 | Stop reason: HOSPADM

## 2025-03-22 RX ORDER — DILTIAZEM HYDROCHLORIDE 120 MG/1
120 CAPSULE, COATED, EXTENDED RELEASE ORAL NIGHTLY
Status: DISCONTINUED | OUTPATIENT
Start: 2025-03-22 | End: 2025-03-25 | Stop reason: HOSPADM

## 2025-03-22 RX ORDER — PANTOPRAZOLE SODIUM 40 MG/1
40 TABLET, DELAYED RELEASE ORAL DAILY
Status: DISCONTINUED | OUTPATIENT
Start: 2025-03-22 | End: 2025-03-25 | Stop reason: HOSPADM

## 2025-03-22 RX ORDER — BUDESONIDE 0.5 MG/2ML
1 INHALANT ORAL
Status: DISCONTINUED | OUTPATIENT
Start: 2025-03-22 | End: 2025-03-22

## 2025-03-22 RX ORDER — NORTRIPTYLINE HYDROCHLORIDE 25 MG/1
75 CAPSULE ORAL NIGHTLY
Status: DISCONTINUED | OUTPATIENT
Start: 2025-03-22 | End: 2025-03-25 | Stop reason: HOSPADM

## 2025-03-22 RX ORDER — METOPROLOL TARTRATE 50 MG
100 TABLET ORAL 2 TIMES DAILY
Status: DISCONTINUED | OUTPATIENT
Start: 2025-03-22 | End: 2025-03-25 | Stop reason: HOSPADM

## 2025-03-22 RX ORDER — ALBUTEROL SULFATE 0.83 MG/ML
2.5 SOLUTION RESPIRATORY (INHALATION) EVERY 6 HOURS PRN
Status: DISCONTINUED | OUTPATIENT
Start: 2025-03-22 | End: 2025-03-25 | Stop reason: HOSPADM

## 2025-03-22 RX ORDER — ARFORMOTEROL TARTRATE 15 UG/2ML
15 SOLUTION RESPIRATORY (INHALATION)
Status: DISCONTINUED | OUTPATIENT
Start: 2025-03-22 | End: 2025-03-25 | Stop reason: HOSPADM

## 2025-03-22 RX ORDER — VALSARTAN 80 MG/1
40 TABLET ORAL DAILY
Status: DISCONTINUED | OUTPATIENT
Start: 2025-03-22 | End: 2025-03-25 | Stop reason: HOSPADM

## 2025-03-22 RX ORDER — ALBUTEROL SULFATE 90 UG/1
2 INHALANT RESPIRATORY (INHALATION) 4 TIMES DAILY
Status: DISCONTINUED | OUTPATIENT
Start: 2025-03-22 | End: 2025-03-22 | Stop reason: CLARIF

## 2025-03-22 RX ORDER — PREDNISONE 10 MG/1
10 TABLET ORAL EVERY MORNING
Status: DISCONTINUED | OUTPATIENT
Start: 2025-03-22 | End: 2025-03-25 | Stop reason: HOSPADM

## 2025-03-22 RX ADMIN — APIXABAN 5 MG: 5 TABLET, FILM COATED ORAL at 00:54

## 2025-03-22 RX ADMIN — METOPROLOL TARTRATE 100 MG: 50 TABLET, FILM COATED ORAL at 09:13

## 2025-03-22 RX ADMIN — SODIUM CHLORIDE, PRESERVATIVE FREE 10 ML: 5 INJECTION INTRAVENOUS at 09:15

## 2025-03-22 RX ADMIN — DONEPEZIL HYDROCHLORIDE 10 MG: 5 TABLET, FILM COATED ORAL at 09:12

## 2025-03-22 RX ADMIN — ATORVASTATIN CALCIUM 10 MG: 10 TABLET, FILM COATED ORAL at 00:54

## 2025-03-22 RX ADMIN — IPRATROPIUM BROMIDE 0.5 MG: 0.5 SOLUTION RESPIRATORY (INHALATION) at 14:23

## 2025-03-22 RX ADMIN — NORTRIPTYLINE HYDROCHLORIDE 75 MG: 25 CAPSULE ORAL at 20:20

## 2025-03-22 RX ADMIN — SODIUM CHLORIDE, PRESERVATIVE FREE 10 ML: 5 INJECTION INTRAVENOUS at 21:00

## 2025-03-22 RX ADMIN — PREDNISONE 10 MG: 10 TABLET ORAL at 09:13

## 2025-03-22 RX ADMIN — PANTOPRAZOLE SODIUM 40 MG: 40 TABLET, DELAYED RELEASE ORAL at 09:13

## 2025-03-22 RX ADMIN — TROSPIUM CHLORIDE 20 MG: 20 TABLET, FILM COATED ORAL at 16:27

## 2025-03-22 RX ADMIN — BISACODYL 5 MG: 5 TABLET, COATED ORAL at 09:13

## 2025-03-22 RX ADMIN — PREGABALIN 200 MG: 100 CAPSULE ORAL at 20:19

## 2025-03-22 RX ADMIN — MORPHINE SULFATE 2 MG: 2 INJECTION, SOLUTION INTRAMUSCULAR; INTRAVENOUS at 00:45

## 2025-03-22 RX ADMIN — INSULIN LISPRO 1 UNITS: 100 INJECTION, SOLUTION INTRAVENOUS; SUBCUTANEOUS at 12:11

## 2025-03-22 RX ADMIN — APIXABAN 5 MG: 5 TABLET, FILM COATED ORAL at 09:13

## 2025-03-22 RX ADMIN — IPRATROPIUM BROMIDE 0.5 MG: 0.5 SOLUTION RESPIRATORY (INHALATION) at 19:10

## 2025-03-22 RX ADMIN — MORPHINE SULFATE 2 MG: 2 INJECTION, SOLUTION INTRAMUSCULAR; INTRAVENOUS at 17:13

## 2025-03-22 RX ADMIN — DILTIAZEM HYDROCHLORIDE 120 MG: 120 CAPSULE, COATED, EXTENDED RELEASE ORAL at 20:18

## 2025-03-22 RX ADMIN — PREGABALIN 200 MG: 100 CAPSULE ORAL at 09:13

## 2025-03-22 RX ADMIN — MORPHINE SULFATE 2 MG: 2 INJECTION, SOLUTION INTRAMUSCULAR; INTRAVENOUS at 09:14

## 2025-03-22 RX ADMIN — BUDESONIDE 1000 MCG: 0.5 INHALANT RESPIRATORY (INHALATION) at 07:43

## 2025-03-22 RX ADMIN — APIXABAN 5 MG: 5 TABLET, FILM COATED ORAL at 20:19

## 2025-03-22 RX ADMIN — INSULIN LISPRO 1 UNITS: 100 INJECTION, SOLUTION INTRAVENOUS; SUBCUTANEOUS at 18:41

## 2025-03-22 RX ADMIN — ATORVASTATIN CALCIUM 10 MG: 10 TABLET, FILM COATED ORAL at 20:19

## 2025-03-22 RX ADMIN — INSULIN LISPRO 2 UNITS: 100 INJECTION, SOLUTION INTRAVENOUS; SUBCUTANEOUS at 20:46

## 2025-03-22 RX ADMIN — METOPROLOL TARTRATE 100 MG: 50 TABLET, FILM COATED ORAL at 00:54

## 2025-03-22 RX ADMIN — ARFORMOTEROL TARTRATE 15 MCG: 15 SOLUTION RESPIRATORY (INHALATION) at 07:43

## 2025-03-22 RX ADMIN — ARFORMOTEROL TARTRATE 15 MCG: 15 SOLUTION RESPIRATORY (INHALATION) at 19:10

## 2025-03-22 RX ADMIN — MORPHINE SULFATE 1 MG: 2 INJECTION, SOLUTION INTRAMUSCULAR; INTRAVENOUS at 21:53

## 2025-03-22 RX ADMIN — MORPHINE SULFATE 2 MG: 2 INJECTION, SOLUTION INTRAMUSCULAR; INTRAVENOUS at 05:49

## 2025-03-22 RX ADMIN — HYDROXYZINE HYDROCHLORIDE 100 MG: 25 TABLET, FILM COATED ORAL at 20:18

## 2025-03-22 RX ADMIN — BUDESONIDE 1000 MCG: 0.5 INHALANT RESPIRATORY (INHALATION) at 19:10

## 2025-03-22 RX ADMIN — IPRATROPIUM BROMIDE 0.5 MG: 0.5 SOLUTION RESPIRATORY (INHALATION) at 07:43

## 2025-03-22 ASSESSMENT — PAIN DESCRIPTION - LOCATION
LOCATION: LEG

## 2025-03-22 ASSESSMENT — PAIN DESCRIPTION - DESCRIPTORS
DESCRIPTORS: ACHING
DESCRIPTORS: ACHING;SORE
DESCRIPTORS: ACHING
DESCRIPTORS: ACHING

## 2025-03-22 ASSESSMENT — PAIN DESCRIPTION - ONSET
ONSET: ON-GOING
ONSET: ON-GOING

## 2025-03-22 ASSESSMENT — PAIN - FUNCTIONAL ASSESSMENT
PAIN_FUNCTIONAL_ASSESSMENT: ACTIVITIES ARE NOT PREVENTED
PAIN_FUNCTIONAL_ASSESSMENT: ACTIVITIES ARE NOT PREVENTED

## 2025-03-22 ASSESSMENT — PAIN SCALES - GENERAL
PAINLEVEL_OUTOF10: 8
PAINLEVEL_OUTOF10: 10
PAINLEVEL_OUTOF10: 7
PAINLEVEL_OUTOF10: 8
PAINLEVEL_OUTOF10: 7
PAINLEVEL_OUTOF10: 3

## 2025-03-22 ASSESSMENT — PAIN DESCRIPTION - PAIN TYPE
TYPE: SURGICAL PAIN
TYPE: SURGICAL PAIN

## 2025-03-22 ASSESSMENT — PAIN DESCRIPTION - ORIENTATION
ORIENTATION: RIGHT;LEFT
ORIENTATION: RIGHT;LEFT
ORIENTATION: RIGHT
ORIENTATION: RIGHT

## 2025-03-22 ASSESSMENT — PAIN DESCRIPTION - FREQUENCY
FREQUENCY: CONTINUOUS
FREQUENCY: CONTINUOUS

## 2025-03-22 NOTE — PLAN OF CARE
Problem: Pain  Goal: Verbalizes/displays adequate comfort level or baseline comfort level  Outcome: Progressing     Problem: ABCDS Injury Assessment  Goal: Absence of physical injury  Outcome: Progressing     Problem: Safety - Adult  Goal: Free from fall injury  Outcome: Progressing     Problem: Skin/Tissue Integrity  Goal: Skin integrity remains intact  Description: 1.  Monitor for areas of redness and/or skin breakdown  2.  Assess vascular access sites hourly  3.  Every 4-6 hours minimum:  Change oxygen saturation probe site  4.  Every 4-6 hours:  If on nasal continuous positive airway pressure, respiratory therapy assess nares and determine need for appliance change or resting period  Outcome: Progressing  Flowsheets (Taken 3/21/2025 2200)  Skin Integrity Remains Intact: Monitor for areas of redness and/or skin breakdown

## 2025-03-23 ENCOUNTER — APPOINTMENT (OUTPATIENT)
Facility: HOSPITAL | Age: 70
DRG: 552 | End: 2025-03-23
Payer: MEDICARE

## 2025-03-23 PROBLEM — M48.02 STENOSIS OF CERVICAL SPINE: Status: ACTIVE | Noted: 2025-03-23

## 2025-03-23 PROBLEM — Q76.49 CONGENITAL CERVICAL SPINE STENOSIS: Status: ACTIVE | Noted: 2025-03-23

## 2025-03-23 PROBLEM — R29.6 FREQUENT FALLS: Status: ACTIVE | Noted: 2025-03-23

## 2025-03-23 LAB
ANION GAP SERPL CALC-SCNC: 4 MMOL/L (ref 3–18)
BUN SERPL-MCNC: 14 MG/DL (ref 7–18)
BUN/CREAT SERPL: 21 (ref 12–20)
CALCIUM SERPL-MCNC: 8.4 MG/DL (ref 8.5–10.1)
CHLORIDE SERPL-SCNC: 105 MMOL/L (ref 100–111)
CO2 SERPL-SCNC: 28 MMOL/L (ref 21–32)
CORTIS AM PEAK SERPL-MCNC: 3.6 UG/DL (ref 4.3–22.45)
CREAT SERPL-MCNC: 0.68 MG/DL (ref 0.6–1.3)
GLUCOSE BLD STRIP.AUTO-MCNC: 145 MG/DL (ref 70–110)
GLUCOSE BLD STRIP.AUTO-MCNC: 188 MG/DL (ref 70–110)
GLUCOSE BLD STRIP.AUTO-MCNC: 194 MG/DL (ref 70–110)
GLUCOSE BLD STRIP.AUTO-MCNC: 296 MG/DL (ref 70–110)
GLUCOSE SERPL-MCNC: 166 MG/DL (ref 74–99)
MAGNESIUM SERPL-MCNC: 2 MG/DL (ref 1.6–2.6)
POTASSIUM SERPL-SCNC: 4.1 MMOL/L (ref 3.5–5.5)
SODIUM SERPL-SCNC: 137 MMOL/L (ref 136–145)

## 2025-03-23 PROCEDURE — 6370000000 HC RX 637 (ALT 250 FOR IP): Performed by: INTERNAL MEDICINE

## 2025-03-23 PROCEDURE — 97110 THERAPEUTIC EXERCISES: CPT

## 2025-03-23 PROCEDURE — 83735 ASSAY OF MAGNESIUM: CPT

## 2025-03-23 PROCEDURE — 94640 AIRWAY INHALATION TREATMENT: CPT

## 2025-03-23 PROCEDURE — 82533 TOTAL CORTISOL: CPT

## 2025-03-23 PROCEDURE — 97116 GAIT TRAINING THERAPY: CPT

## 2025-03-23 PROCEDURE — 6370000000 HC RX 637 (ALT 250 FOR IP): Performed by: HOSPITALIST

## 2025-03-23 PROCEDURE — 6360000002 HC RX W HCPCS: Performed by: INTERNAL MEDICINE

## 2025-03-23 PROCEDURE — 36415 COLL VENOUS BLD VENIPUNCTURE: CPT

## 2025-03-23 PROCEDURE — 2500000003 HC RX 250 WO HCPCS: Performed by: INTERNAL MEDICINE

## 2025-03-23 PROCEDURE — 80048 BASIC METABOLIC PNL TOTAL CA: CPT

## 2025-03-23 PROCEDURE — 82962 GLUCOSE BLOOD TEST: CPT

## 2025-03-23 PROCEDURE — 97530 THERAPEUTIC ACTIVITIES: CPT

## 2025-03-23 PROCEDURE — 1100000000 HC RM PRIVATE

## 2025-03-23 PROCEDURE — 70450 CT HEAD/BRAIN W/O DYE: CPT

## 2025-03-23 RX ORDER — FUROSEMIDE 40 MG/1
20 TABLET ORAL DAILY
Qty: 30 TABLET | Refills: 0
Start: 2025-03-23

## 2025-03-23 RX ORDER — PREGABALIN 200 MG/1
200 CAPSULE ORAL 2 TIMES DAILY
Qty: 2 CAPSULE | Refills: 0 | Status: SHIPPED | OUTPATIENT
Start: 2025-03-23 | End: 2025-03-24

## 2025-03-23 RX ORDER — NORTRIPTYLINE HYDROCHLORIDE 25 MG/1
75 CAPSULE ORAL NIGHTLY
Qty: 1 CAPSULE | Refills: 0 | Status: SHIPPED | OUTPATIENT
Start: 2025-03-23 | End: 2025-03-24

## 2025-03-23 RX ORDER — VALSARTAN 40 MG/1
20 TABLET ORAL DAILY
Qty: 15 TABLET | Refills: 3 | Status: SHIPPED | OUTPATIENT
Start: 2025-03-23

## 2025-03-23 RX ORDER — OXYCODONE AND ACETAMINOPHEN 5; 325 MG/1; MG/1
1 TABLET ORAL EVERY 4 HOURS PRN
Refills: 0 | Status: DISCONTINUED | OUTPATIENT
Start: 2025-03-23 | End: 2025-03-25 | Stop reason: HOSPADM

## 2025-03-23 RX ORDER — MINERAL OIL AND WHITE PETROLATUM 150; 830 MG/G; MG/G
OINTMENT OPHTHALMIC 4 TIMES DAILY
Status: COMPLETED | OUTPATIENT
Start: 2025-03-23 | End: 2025-03-24

## 2025-03-23 RX ADMIN — SODIUM CHLORIDE, PRESERVATIVE FREE 10 ML: 5 INJECTION INTRAVENOUS at 09:10

## 2025-03-23 RX ADMIN — INSULIN LISPRO 1 UNITS: 100 INJECTION, SOLUTION INTRAVENOUS; SUBCUTANEOUS at 21:08

## 2025-03-23 RX ADMIN — IPRATROPIUM BROMIDE 0.5 MG: 0.5 SOLUTION RESPIRATORY (INHALATION) at 08:33

## 2025-03-23 RX ADMIN — PREGABALIN 200 MG: 100 CAPSULE ORAL at 21:07

## 2025-03-23 RX ADMIN — ATORVASTATIN CALCIUM 10 MG: 10 TABLET, FILM COATED ORAL at 21:08

## 2025-03-23 RX ADMIN — IPRATROPIUM BROMIDE 0.5 MG: 0.5 SOLUTION RESPIRATORY (INHALATION) at 14:33

## 2025-03-23 RX ADMIN — ARFORMOTEROL TARTRATE 15 MCG: 15 SOLUTION RESPIRATORY (INHALATION) at 19:17

## 2025-03-23 RX ADMIN — ARFORMOTEROL TARTRATE 15 MCG: 15 SOLUTION RESPIRATORY (INHALATION) at 08:33

## 2025-03-23 RX ADMIN — IPRATROPIUM BROMIDE 0.5 MG: 0.5 SOLUTION RESPIRATORY (INHALATION) at 19:17

## 2025-03-23 RX ADMIN — MINERAL OIL, PETROLATUM: 425; 568 OINTMENT OPHTHALMIC at 08:19

## 2025-03-23 RX ADMIN — MINERAL OIL, PETROLATUM: 425; 568 OINTMENT OPHTHALMIC at 18:57

## 2025-03-23 RX ADMIN — INSULIN LISPRO 2 UNITS: 100 INJECTION, SOLUTION INTRAVENOUS; SUBCUTANEOUS at 18:55

## 2025-03-23 RX ADMIN — OXYCODONE AND ACETAMINOPHEN 1 TABLET: 325; 5 TABLET ORAL at 15:59

## 2025-03-23 RX ADMIN — APIXABAN 5 MG: 5 TABLET, FILM COATED ORAL at 21:08

## 2025-03-23 RX ADMIN — APIXABAN 5 MG: 5 TABLET, FILM COATED ORAL at 09:08

## 2025-03-23 RX ADMIN — PANTOPRAZOLE SODIUM 40 MG: 40 TABLET, DELAYED RELEASE ORAL at 09:08

## 2025-03-23 RX ADMIN — DILTIAZEM HYDROCHLORIDE 120 MG: 120 CAPSULE, COATED, EXTENDED RELEASE ORAL at 21:08

## 2025-03-23 RX ADMIN — HYDROXYZINE HYDROCHLORIDE 100 MG: 25 TABLET, FILM COATED ORAL at 21:07

## 2025-03-23 RX ADMIN — METOPROLOL TARTRATE 100 MG: 50 TABLET, FILM COATED ORAL at 09:08

## 2025-03-23 RX ADMIN — MINERAL OIL, PETROLATUM: 425; 568 OINTMENT OPHTHALMIC at 21:13

## 2025-03-23 RX ADMIN — TROSPIUM CHLORIDE 20 MG: 20 TABLET, FILM COATED ORAL at 15:59

## 2025-03-23 RX ADMIN — PREDNISONE 10 MG: 10 TABLET ORAL at 09:08

## 2025-03-23 RX ADMIN — INSULIN LISPRO 1 UNITS: 100 INJECTION, SOLUTION INTRAVENOUS; SUBCUTANEOUS at 11:35

## 2025-03-23 RX ADMIN — METOPROLOL TARTRATE 100 MG: 50 TABLET, FILM COATED ORAL at 21:08

## 2025-03-23 RX ADMIN — BISACODYL 5 MG: 5 TABLET, COATED ORAL at 09:09

## 2025-03-23 RX ADMIN — MINERAL OIL, PETROLATUM: 425; 568 OINTMENT OPHTHALMIC at 13:54

## 2025-03-23 RX ADMIN — TROSPIUM CHLORIDE 20 MG: 20 TABLET, FILM COATED ORAL at 06:15

## 2025-03-23 RX ADMIN — BUDESONIDE 1000 MCG: 0.5 INHALANT RESPIRATORY (INHALATION) at 08:33

## 2025-03-23 RX ADMIN — NORTRIPTYLINE HYDROCHLORIDE 75 MG: 25 CAPSULE ORAL at 21:07

## 2025-03-23 RX ADMIN — BUDESONIDE 1000 MCG: 0.5 INHALANT RESPIRATORY (INHALATION) at 19:17

## 2025-03-23 RX ADMIN — PREGABALIN 200 MG: 100 CAPSULE ORAL at 09:08

## 2025-03-23 RX ADMIN — DONEPEZIL HYDROCHLORIDE 10 MG: 5 TABLET, FILM COATED ORAL at 09:08

## 2025-03-23 RX ADMIN — SODIUM CHLORIDE, PRESERVATIVE FREE 10 ML: 5 INJECTION INTRAVENOUS at 21:09

## 2025-03-23 ASSESSMENT — PAIN SCALES - GENERAL: PAINLEVEL_OUTOF10: 9

## 2025-03-23 NOTE — PLAN OF CARE
Problem: Chronic Conditions and Co-morbidities  Goal: Patient's chronic conditions and co-morbidity symptoms are monitored and maintained or improved  3/23/2025 1257 by Joyce Atkinson RN  Outcome: Progressing  3/23/2025 0037 by Dianna Hinkle RN  Outcome: Progressing  Flowsheets (Taken 3/22/2025 2306)  Care Plan - Patient's Chronic Conditions and Co-Morbidity Symptoms are Monitored and Maintained or Improved: Monitor and assess patient's chronic conditions and comorbid symptoms for stability, deterioration, or improvement     Problem: Skin/Tissue Integrity  Goal: Skin integrity remains intact  Description: 1.  Monitor for areas of redness and/or skin breakdown  2.  Assess vascular access sites hourly  3.  Every 4-6 hours minimum:  Change oxygen saturation probe site  4.  Every 4-6 hours:  If on nasal continuous positive airway pressure, respiratory therapy assess nares and determine need for appliance change or resting period  3/23/2025 1257 by Joyce Atkinson RN  Outcome: Progressing  3/23/2025 0037 by Dianna Hinkle RN  Outcome: Progressing  Flowsheets  Taken 3/22/2025 2306  Skin Integrity Remains Intact: Monitor for areas of redness and/or skin breakdown  Taken 3/22/2025 2258  Skin Integrity Remains Intact: Monitor for areas of redness and/or skin breakdown     Problem: Safety - Adult  Goal: Free from fall injury  3/23/2025 1257 by Joyce Atkinson RN  Outcome: Progressing  3/23/2025 0037 by Dianna Hinkle RN  Outcome: Progressing  Flowsheets (Taken 3/22/2025 2258)  Free From Fall Injury: Instruct family/caregiver on patient safety     Problem: ABCDS Injury Assessment  Goal: Absence of physical injury  3/23/2025 1257 by Joyce Atkinson RN  Outcome: Progressing  3/23/2025 0037 by Dianna Hinkle RN  Outcome: Progressing  Flowsheets (Taken 3/22/2025 2258)  Absence of Physical Injury: Implement safety measures based on patient assessment     Problem: Pain  Goal: Verbalizes/displays adequate

## 2025-03-23 NOTE — PLAN OF CARE
Problem: Chronic Conditions and Co-morbidities  Goal: Patient's chronic conditions and co-morbidity symptoms are monitored and maintained or improved  Outcome: Progressing  Flowsheets (Taken 3/22/2025 2306)  Care Plan - Patient's Chronic Conditions and Co-Morbidity Symptoms are Monitored and Maintained or Improved: Monitor and assess patient's chronic conditions and comorbid symptoms for stability, deterioration, or improvement     Problem: Skin/Tissue Integrity  Goal: Skin integrity remains intact  Description: 1.  Monitor for areas of redness and/or skin breakdown  2.  Assess vascular access sites hourly  3.  Every 4-6 hours minimum:  Change oxygen saturation probe site  4.  Every 4-6 hours:  If on nasal continuous positive airway pressure, respiratory therapy assess nares and determine need for appliance change or resting period  Outcome: Progressing  Flowsheets  Taken 3/22/2025 2306  Skin Integrity Remains Intact: Monitor for areas of redness and/or skin breakdown  Taken 3/22/2025 2258  Skin Integrity Remains Intact: Monitor for areas of redness and/or skin breakdown     Problem: Safety - Adult  Goal: Free from fall injury  Outcome: Progressing  Flowsheets (Taken 3/22/2025 2258)  Free From Fall Injury: Instruct family/caregiver on patient safety     Problem: Safety - Adult  Goal: Free from fall injury  Outcome: Progressing  Flowsheets (Taken 3/22/2025 2258)  Free From Fall Injury: Instruct family/caregiver on patient safety     Problem: ABCDS Injury Assessment  Goal: Absence of physical injury  Outcome: Progressing  Flowsheets (Taken 3/22/2025 2258)  Absence of Physical Injury: Implement safety measures based on patient assessment     Problem: Pain  Goal: Verbalizes/displays adequate comfort level or baseline comfort level  Outcome: Progressing  Flowsheets (Taken 3/22/2025 2300)  Verbalizes/displays adequate comfort level or baseline comfort level:   Encourage patient to monitor pain and request assistance

## 2025-03-24 LAB
ANION GAP SERPL CALC-SCNC: 5 MMOL/L (ref 3–18)
BUN SERPL-MCNC: 16 MG/DL (ref 7–18)
BUN/CREAT SERPL: 27 (ref 12–20)
CALCIUM SERPL-MCNC: 8.8 MG/DL (ref 8.5–10.1)
CHLORIDE SERPL-SCNC: 106 MMOL/L (ref 100–111)
CO2 SERPL-SCNC: 28 MMOL/L (ref 21–32)
CREAT SERPL-MCNC: 0.6 MG/DL (ref 0.6–1.3)
EKG ATRIAL RATE: 91 BPM
EKG DIAGNOSIS: NORMAL
EKG P AXIS: 54 DEGREES
EKG P-R INTERVAL: 190 MS
EKG Q-T INTERVAL: 398 MS
EKG QRS DURATION: 92 MS
EKG QTC CALCULATION (BAZETT): 489 MS
EKG R AXIS: -8 DEGREES
EKG T AXIS: 47 DEGREES
EKG VENTRICULAR RATE: 91 BPM
GLUCOSE BLD STRIP.AUTO-MCNC: 128 MG/DL (ref 70–110)
GLUCOSE BLD STRIP.AUTO-MCNC: 169 MG/DL (ref 70–110)
GLUCOSE BLD STRIP.AUTO-MCNC: 234 MG/DL (ref 70–110)
GLUCOSE BLD STRIP.AUTO-MCNC: 258 MG/DL (ref 70–110)
GLUCOSE SERPL-MCNC: 153 MG/DL (ref 74–99)
INTERPRETATION: NORMAL
MAGNESIUM SERPL-MCNC: 2 MG/DL (ref 1.6–2.6)
POTASSIUM SERPL-SCNC: 4.2 MMOL/L (ref 3.5–5.5)
SODIUM SERPL-SCNC: 139 MMOL/L (ref 136–145)
VIT B12 SERPL-MCNC: 1282 PG/ML (ref 232–1245)

## 2025-03-24 PROCEDURE — 2500000003 HC RX 250 WO HCPCS: Performed by: INTERNAL MEDICINE

## 2025-03-24 PROCEDURE — 93010 ELECTROCARDIOGRAM REPORT: CPT | Performed by: INTERNAL MEDICINE

## 2025-03-24 PROCEDURE — 97602 WOUND(S) CARE NON-SELECTIVE: CPT

## 2025-03-24 PROCEDURE — 6370000000 HC RX 637 (ALT 250 FOR IP): Performed by: HOSPITALIST

## 2025-03-24 PROCEDURE — 36415 COLL VENOUS BLD VENIPUNCTURE: CPT

## 2025-03-24 PROCEDURE — 94640 AIRWAY INHALATION TREATMENT: CPT

## 2025-03-24 PROCEDURE — 82962 GLUCOSE BLOOD TEST: CPT

## 2025-03-24 PROCEDURE — 1100000000 HC RM PRIVATE

## 2025-03-24 PROCEDURE — 6370000000 HC RX 637 (ALT 250 FOR IP): Performed by: INTERNAL MEDICINE

## 2025-03-24 PROCEDURE — 6360000002 HC RX W HCPCS: Performed by: INTERNAL MEDICINE

## 2025-03-24 PROCEDURE — 83735 ASSAY OF MAGNESIUM: CPT

## 2025-03-24 PROCEDURE — 99223 1ST HOSP IP/OBS HIGH 75: CPT | Performed by: INTERNAL MEDICINE

## 2025-03-24 PROCEDURE — 80048 BASIC METABOLIC PNL TOTAL CA: CPT

## 2025-03-24 RX ORDER — OXYCODONE AND ACETAMINOPHEN 5; 325 MG/1; MG/1
1 TABLET ORAL EVERY 8 HOURS PRN
Qty: 3 TABLET | Refills: 0 | Status: SHIPPED | OUTPATIENT
Start: 2025-03-24 | End: 2025-03-25

## 2025-03-24 RX ADMIN — METOPROLOL TARTRATE 100 MG: 50 TABLET, FILM COATED ORAL at 10:03

## 2025-03-24 RX ADMIN — INSULIN LISPRO 1 UNITS: 100 INJECTION, SOLUTION INTRAVENOUS; SUBCUTANEOUS at 21:48

## 2025-03-24 RX ADMIN — ACETAMINOPHEN 650 MG: 325 TABLET ORAL at 11:28

## 2025-03-24 RX ADMIN — SODIUM CHLORIDE, PRESERVATIVE FREE 10 ML: 5 INJECTION INTRAVENOUS at 10:04

## 2025-03-24 RX ADMIN — TROSPIUM CHLORIDE 20 MG: 20 TABLET, FILM COATED ORAL at 06:37

## 2025-03-24 RX ADMIN — METOPROLOL TARTRATE 100 MG: 50 TABLET, FILM COATED ORAL at 21:47

## 2025-03-24 RX ADMIN — TROSPIUM CHLORIDE 20 MG: 20 TABLET, FILM COATED ORAL at 17:28

## 2025-03-24 RX ADMIN — IPRATROPIUM BROMIDE 0.5 MG: 0.5 SOLUTION RESPIRATORY (INHALATION) at 21:55

## 2025-03-24 RX ADMIN — PREGABALIN 200 MG: 100 CAPSULE ORAL at 10:02

## 2025-03-24 RX ADMIN — HYDROXYZINE HYDROCHLORIDE 100 MG: 25 TABLET, FILM COATED ORAL at 21:47

## 2025-03-24 RX ADMIN — SODIUM CHLORIDE, PRESERVATIVE FREE 10 ML: 5 INJECTION INTRAVENOUS at 21:49

## 2025-03-24 RX ADMIN — APIXABAN 5 MG: 5 TABLET, FILM COATED ORAL at 21:47

## 2025-03-24 RX ADMIN — IPRATROPIUM BROMIDE 0.5 MG: 0.5 SOLUTION RESPIRATORY (INHALATION) at 14:10

## 2025-03-24 RX ADMIN — ATORVASTATIN CALCIUM 10 MG: 10 TABLET, FILM COATED ORAL at 21:47

## 2025-03-24 RX ADMIN — PREGABALIN 200 MG: 100 CAPSULE ORAL at 21:47

## 2025-03-24 RX ADMIN — IPRATROPIUM BROMIDE 0.5 MG: 0.5 SOLUTION RESPIRATORY (INHALATION) at 08:10

## 2025-03-24 RX ADMIN — BUDESONIDE 1000 MCG: 0.5 INHALANT RESPIRATORY (INHALATION) at 08:10

## 2025-03-24 RX ADMIN — MINERAL OIL, PETROLATUM: 425; 568 OINTMENT OPHTHALMIC at 21:48

## 2025-03-24 RX ADMIN — BISACODYL 5 MG: 5 TABLET, COATED ORAL at 10:02

## 2025-03-24 RX ADMIN — MINERAL OIL, PETROLATUM: 425; 568 OINTMENT OPHTHALMIC at 10:04

## 2025-03-24 RX ADMIN — MINERAL OIL, PETROLATUM: 425; 568 OINTMENT OPHTHALMIC at 17:28

## 2025-03-24 RX ADMIN — ARFORMOTEROL TARTRATE 15 MCG: 15 SOLUTION RESPIRATORY (INHALATION) at 21:55

## 2025-03-24 RX ADMIN — MINERAL OIL, PETROLATUM: 425; 568 OINTMENT OPHTHALMIC at 16:25

## 2025-03-24 RX ADMIN — PANTOPRAZOLE SODIUM 40 MG: 40 TABLET, DELAYED RELEASE ORAL at 10:02

## 2025-03-24 RX ADMIN — PREDNISONE 10 MG: 10 TABLET ORAL at 10:02

## 2025-03-24 RX ADMIN — DILTIAZEM HYDROCHLORIDE 120 MG: 120 CAPSULE, COATED, EXTENDED RELEASE ORAL at 21:47

## 2025-03-24 RX ADMIN — BUDESONIDE 1000 MCG: 0.5 INHALANT RESPIRATORY (INHALATION) at 21:55

## 2025-03-24 RX ADMIN — NORTRIPTYLINE HYDROCHLORIDE 75 MG: 25 CAPSULE ORAL at 21:46

## 2025-03-24 RX ADMIN — INSULIN LISPRO 2 UNITS: 100 INJECTION, SOLUTION INTRAVENOUS; SUBCUTANEOUS at 17:28

## 2025-03-24 RX ADMIN — APIXABAN 5 MG: 5 TABLET, FILM COATED ORAL at 10:02

## 2025-03-24 RX ADMIN — ARFORMOTEROL TARTRATE 15 MCG: 15 SOLUTION RESPIRATORY (INHALATION) at 08:10

## 2025-03-24 RX ADMIN — DONEPEZIL HYDROCHLORIDE 10 MG: 5 TABLET, FILM COATED ORAL at 10:02

## 2025-03-24 ASSESSMENT — PAIN SCALES - GENERAL
PAINLEVEL_OUTOF10: 2
PAINLEVEL_OUTOF10: 4
PAINLEVEL_OUTOF10: 2

## 2025-03-24 ASSESSMENT — PAIN DESCRIPTION - DESCRIPTORS: DESCRIPTORS: ACHING

## 2025-03-24 ASSESSMENT — PAIN DESCRIPTION - LOCATION: LOCATION: FOOT

## 2025-03-24 ASSESSMENT — PAIN DESCRIPTION - ORIENTATION: ORIENTATION: LEFT

## 2025-03-24 NOTE — CONSULTS
NEUROLOGY CONSULTATION NOTE    Patient: Lorena Nichole MRN: [id number]  CSN: [id number]    YOB: 1955  Age: 69 y.o.  Sex: female    DOA: 3/21/2025 LOS:  LOS: 1 day        Requesting Physician: Dr. Urban  Reason for Consultation: frequent falls               HISTORY OF PRESENT ILLNESS:   Lorena Nichole is a 69 y.o. female with history of cervical spine stenosis and inctracranial AVM presents to ER for frequent falls and intractable pain.   Patient saw her neurologist Dr. Aranda a week ago for frequent falls. She denies LOC when falling states that ' she just fall' without provokers. She has history of 3 cm shunting lesion with supply from the right MCA, right THANG, and bilateral middle meningeal arteries, draining into the superior sagittal sinus via a dilated cortical vein. She underwent staged embolization on 10/16/2023 (dural feeders) and 11/30/2023 (MCA and THANG feeders), complicated by a right convexity subarachnoid hemorrhage on head CT (11/30/2023). Further embolization was performed at [facility] (1/25/2024, 2/15/2024; Davion Mendez, [place]), targeting the external carotid artery supply. Multiple procedures resulted in alopecia and scalp radiation injury, now improving.   She also has cervical spine severe stenosis following with neurosrugery at [facility].     PAST MEDICAL HISTORY:  Past Medical History:   Diagnosis Date    Anticoagulated     due to PVD    Arrhythmia     sees Dr. Gonzales - uses Beta Blockers for rate control    Asthma     Chronic back pain     Eczema     GERD (gastroesophageal reflux disease)     Hypercholesterolemia     Hypertension     Insufficiency, adrenal     pt states due to multiple steroid injection for back pain and eczema    Pre-diabetes     last HgbA1C (early 2017) was under 7.0    PVD (peripheral vascular disease)     hx of carotid disease    Severe obesity     Severe obesity (BMI 35.0-39.9) with comorbidity     Sleep apnea     does not use a device    TIA (transient 
Inpatient Wound Care Note:     Lorena Nichole  MEDICAL RECORD NUMBER:  [id number]  AGE: 69 y.o.   GENDER: female  : 1955  TODAY'S DATE:  3/24/2025    [] Follow-up visit for   [x] New consult for leg wound  Seen in  with no assistance  Patient admitted from home    PAST MEDICAL HISTORY    Past Medical History:   Diagnosis Date    Anticoagulated     due to PVD    Arrhythmia     sees Dr. Gonzales - uses Beta Blockers for rate control    Asthma     Chronic back pain     Eczema     GERD (gastroesophageal reflux disease)     Hypercholesterolemia     Hypertension     Insufficiency, adrenal     pt states due to multiple steroid injection for back pain and eczema    Pre-diabetes     last HgbA1C (early ) was under 7.0    PVD (peripheral vascular disease)     hx of carotid disease    Severe obesity     Severe obesity (BMI 35.0-39.9) with comorbidity     Sleep apnea     does not use a device    TIA (transient ischemic attack)     X 2 related to carotid disease        PAST SURGICAL HISTORY    Past Surgical History:   Procedure Laterality Date    APPENDECTOMY      BLADDER SUSPENSION      CAROTID ENDARTERECTOMY  2012    left    CERVICAL FUSION      CHOLECYSTECTOMY, LAPAROSCOPIC      HEMORRHOID SURGERY      HYSTERECTOMY, TOTAL ABDOMINAL (CERVIX REMOVED)      LUMBAR FUSION      X 2    ORTHOPEDIC SURGERY      tendon repair foot    ORTHOPEDIC SURGERY      hammer toe     SOCIAL HISTORY    Social History     Tobacco Use    Smoking status: Never    Smokeless tobacco: Never   Substance Use Topics    Alcohol use: No    Drug use: No       ALLERGIES    Allergies   Allergen Reactions    Adhesive Tape      paper    Iodine Cough and Swelling       MEDICATIONS    No current facility-administered medications on file prior to encounter.     Current Outpatient Medications on File Prior to Encounter   Medication Sig Dispense Refill    predniSONE (DELTASONE) 10 MG tablet Take 2.5 tablets by mouth daily Pt states sme days she 
Awake, follows verbal commands appropriately, well-appearing, not in acute distress  Eyes: pupils equal, anicteric  ENMT: no nasal discharge, moist mucous membranes  Cardiovascular: S1-S2 heard  Respiratory: Clear to auscultation bilaterally without any wheezes  Gastrointestinal: soft non-tender, nondistended, +bowel sounds  Musculoskeletal: Trace pedal edema.  Pation  Skin: warm, dry  Neurologic: following commands, moving all extremities while in bed, no tremors  Psychiatric: No agitation, no hallucinations  Other:       PALLIATIVE DIAGNOSES:   goals of care discussion/Advance care planning   2.  Palliative care encounter and DNR discussion  3.  Ambulatory dysfunction and burgeoning chronic debility  4.  Intractable pain especially chronic back pain due to spinal stenosis  5.  Adrenal insufficiency in setting of history of DVT and PE as well as frequent falls  6.  Diabetes mellitus, CHF, sleep apnea, hyperlipidemia, asthma, CVA etc.    Patient Active Problem List   Diagnosis    TIA (transient ischemic attack)    GERD (gastroesophageal reflux disease)    Chronic back pain    PVD (peripheral vascular disease)    Asthma    Insufficiency, adrenal    Severe obesity    Hypercholesterolemia    Hypertension    Sleep apnea    Severe obesity (BMI 35.0-39.9) with comorbidity    Anticoagulated    Arrhythmia    Pre-diabetes    Eczema    Cellulitis    Diabetes mellitus, type 2 (HCC)    Immunocompromised    Ulcer of right lower extremity with muscle involvement without evidence of necrosis (HCC)    Cellulitis of leg, right    History of DVT (deep vein thrombosis)    Chronic diastolic (congestive) heart failure (HCC)    Cerebral AVM    History of stroke    COPD (chronic obstructive pulmonary disease) (HCC)    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

## 2025-03-24 NOTE — DISCHARGE SUMMARY
Discharge Summary    Patient: Lorena Nichole               Sex: female          DOA: 3/21/2025         YOB: 1955      Age:  69 y.o.        LOS:  LOS: 4 days                Admit Date: 3/21/2025    Discharge Date: 3/25/2025    Admission Diagnoses: PVD (peripheral vascular disease) [I73.9]  Essential hypertension [I10]  Physical deconditioning [R53.81]  Anticoagulated [Z79.01]  Frequent falls [R29.6]  Intractable pain [R52]    Discharge Diagnoses:    Hospital Problems           Last Modified POA    * (Principal) Intractable pain and physical deconditioning 3/21/2025 Yes    Insufficiency, adrenal (Chronic) 3/21/2025 Yes    Overview Signed 3/19/2022  2:24 AM by Jules Convprob   pt states due to multiple steroid injection for back pain and e           Diabetes mellitus, type 2 (HCC) (Chronic) 3/21/2025 Yes    Ambulatory dysfunction 3/21/2025 Yes    Physical deconditioning 3/21/2025 Yes    GERD (gastroesophageal reflux disease) (Chronic) 3/21/2025 Yes    Chronic back pain (Chronic) 3/21/2025 Yes    PVD (peripheral vascular disease) (Chronic) 3/21/2025 Yes    Severe obesity (Chronic) 3/21/2025 Yes    Hypercholesterolemia (Chronic) 3/21/2025 Yes    Hypertension (Chronic) 3/21/2025 Yes    Sleep apnea (Chronic) 3/21/2025 Yes    Overview Signed 3/19/2022  7:43 PM by Jules, [email or web address]   does not use a device           Anticoagulated (Chronic) 3/21/2025 Yes    Overview Signed 3/20/2022 12:16 AM by Jules [email or web address]   due to PVD           Eczema (Chronic) 3/21/2025 Yes    History of DVT (deep vein thrombosis) (Chronic) 3/21/2025 Yes    Chronic diastolic (congestive) heart failure (HCC) (Chronic) 3/21/2025 Yes    Cerebral AVM (Chronic) 3/21/2025 Yes    History of stroke (Chronic) 3/21/2025 Yes    COPD (chronic obstructive pulmonary disease) (HCC) (Chronic) 3/21/2025 Yes    Frequent falls 3/23/2025 Yes    Stenosis of cervical spine 3/23/2025 Yes         Discharge Condition: Stable    Hospital Course ;   Lorena ARANA

## 2025-03-24 NOTE — PLAN OF CARE
Problem: Chronic Conditions and Co-morbidities  Goal: Patient's chronic conditions and co-morbidity symptoms are monitored and maintained or improved  Outcome: Progressing     Problem: Skin/Tissue Integrity  Goal: Skin integrity remains intact  Description: 1.  Monitor for areas of redness and/or skin breakdown  2.  Assess vascular access sites hourly  3.  Every 4-6 hours minimum:  Change oxygen saturation probe site  4.  Every 4-6 hours:  If on nasal continuous positive airway pressure, respiratory therapy assess nares and determine need for appliance change or resting period  Outcome: Progressing  Flowsheets (Taken 3/24/2025 1005)  Skin Integrity Remains Intact:   Monitor for areas of redness and/or skin breakdown   Assess vascular access sites hourly     Problem: Safety - Adult  Goal: Free from fall injury  Outcome: Progressing     Problem: ABCDS Injury Assessment  Goal: Absence of physical injury  Outcome: Progressing     Problem: Pain  Goal: Verbalizes/displays adequate comfort level or baseline comfort level  Outcome: Progressing

## 2025-03-24 NOTE — ACP (ADVANCE CARE PLANNING)
Advance Care Planning       Palliative Team Advance Care Planning (ACP) Conversation        Date of Conversation: 03/24/25      Individuals present for the conversation: Patient with decision making capacity, Dr. Santo Cortez (Palliative) and this writer.       ACP documents on file prior to discussion:  -None      Previously completed document/s not on file: Patient / participant reports that there are no previously executed ACP documents.      Healthcare Decision Maker:    Patient did not have a formal healthcare decision maker document, on file. A new advance medical directive (AMD) was completed, with patient. She named her son (Benedict Ruiz) as her primary decision maker and her sister (Berna Castro) as her secondary decision maker. A copy of the new AMD has now been scanned in to the EMR.      Primary Decision Maker: Benedict Ruiz - Child - [phone]    Secondary Decision Maker: Berna Castro - Brother/Sister - [phone]       Conversation Summary:      This writer, along with Dr. Santo Cortez, with the Palliative Care team, visited with patient today to offer support and also address healthcare decision maker(s) and discuss goals of care moving forward. Patient reported that she lives with her  and her son (Benedict). Patient went on to explain that her  has early dementia and needs assistance, himself. Her son (Benedict), from a previous marriage, takes care of patient and patient's .     Patient was fairly independent with her ADLs and IADLs. Patient uses a cane and a walker, for mobility purposes. Patient reported that she has been falling a lot, at home. Her frequent falls have been happening over the last year. Patient is open to potential rehab, before returning home. Patient did not have a formal healthcare decision maker document, on file. Patient wanted to complete a new advance medical directive (AMD).     A new AMD was completed, naming her son (Benedict) as her primary decision

## 2025-03-24 NOTE — WOUND CARE
Chart reviewed for current wound care needs.  Patient has documented wounds on right lower leg and is a wound care clinic patient.  Wound care will follow during admission

## 2025-03-24 NOTE — PLAN OF CARE
Problem: Chronic Conditions and Co-morbidities  Goal: Patient's chronic conditions and co-morbidity symptoms are monitored and maintained or improved  3/23/2025 2235 by Lg Mccullough RN  Outcome: Progressing  Flowsheets (Taken 3/23/2025 2235)  Care Plan - Patient's Chronic Conditions and Co-Morbidity Symptoms are Monitored and Maintained or Improved:   Monitor and assess patient's chronic conditions and comorbid symptoms for stability, deterioration, or improvement   Collaborate with multidisciplinary team to address chronic and comorbid conditions and prevent exacerbation or deterioration   Update acute care plan with appropriate goals if chronic or comorbid symptoms are exacerbated and prevent overall improvement and discharge     Problem: Pain  Goal: Verbalizes/displays adequate comfort level or baseline comfort level  3/23/2025 2235 by Lg Mccullough RN  Outcome: Progressing  Flowsheets (Taken 3/23/2025 2235)  Verbalizes/displays adequate comfort level or baseline comfort level:   Encourage patient to monitor pain and request assistance   Administer analgesics based on type and severity of pain and evaluate response   Consider cultural and social influences on pain and pain management   Assess pain using appropriate pain scale   Implement non-pharmacological measures as appropriate and evaluate response   Notify Licensed Independent Practitioner if interventions unsuccessful or patient reports new pain

## 2025-03-25 VITALS
HEART RATE: 61 BPM | TEMPERATURE: 97.9 F | SYSTOLIC BLOOD PRESSURE: 108 MMHG | DIASTOLIC BLOOD PRESSURE: 67 MMHG | BODY MASS INDEX: 42.48 KG/M2 | WEIGHT: 225 LBS | HEIGHT: 61 IN | RESPIRATION RATE: 18 BRPM | OXYGEN SATURATION: 97 %

## 2025-03-25 LAB
ALBUMIN SERPL-MCNC: 2.7 G/DL (ref 3.4–5)
ANION GAP SERPL CALC-SCNC: 4 MMOL/L (ref 3–18)
BASOPHILS # BLD: 0.03 K/UL (ref 0–0.1)
BASOPHILS NFR BLD: 0.4 % (ref 0–2)
BUN SERPL-MCNC: 20 MG/DL (ref 7–18)
BUN/CREAT SERPL: 24 (ref 12–20)
CALCIUM SERPL-MCNC: 9.3 MG/DL (ref 8.5–10.1)
CHLORIDE SERPL-SCNC: 106 MMOL/L (ref 100–111)
CO2 SERPL-SCNC: 30 MMOL/L (ref 21–32)
CREAT SERPL-MCNC: 0.84 MG/DL (ref 0.6–1.3)
DIFFERENTIAL METHOD BLD: ABNORMAL
EOSINOPHIL # BLD: 0.13 K/UL (ref 0–0.4)
EOSINOPHIL NFR BLD: 1.6 % (ref 0–5)
ERYTHROCYTE [DISTWIDTH] IN BLOOD BY AUTOMATED COUNT: 13.6 % (ref 11.6–14.5)
GLUCOSE BLD STRIP.AUTO-MCNC: 143 MG/DL (ref 70–110)
GLUCOSE BLD STRIP.AUTO-MCNC: 340 MG/DL (ref 70–110)
GLUCOSE SERPL-MCNC: 141 MG/DL (ref 74–99)
HCT VFR BLD AUTO: 37.6 % (ref 35–45)
HGB BLD-MCNC: 12.2 G/DL (ref 12–16)
IMM GRANULOCYTES # BLD AUTO: 0.03 K/UL (ref 0–0.04)
IMM GRANULOCYTES NFR BLD AUTO: 0.4 % (ref 0–0.5)
LYMPHOCYTES # BLD: 2.6 K/UL (ref 0.9–3.3)
LYMPHOCYTES NFR BLD: 32.2 % (ref 21–52)
MCH RBC QN AUTO: 30.9 PG (ref 24–34)
MCHC RBC AUTO-ENTMCNC: 32.4 G/DL (ref 31–37)
MCV RBC AUTO: 95.2 FL (ref 78–100)
MONOCYTES # BLD: 0.7 K/UL (ref 0.05–1.2)
MONOCYTES NFR BLD: 8.7 % (ref 3–10)
NEUTS SEG # BLD: 4.59 K/UL (ref 1.8–8)
NEUTS SEG NFR BLD: 56.7 % (ref 40–73)
NRBC # BLD: 0 K/UL (ref 0–0.01)
NRBC BLD-RTO: 0 PER 100 WBC
PHOSPHATE SERPL-MCNC: 4.2 MG/DL (ref 2.5–4.9)
PLATELET # BLD AUTO: 312 K/UL (ref 135–420)
PMV BLD AUTO: 10.1 FL (ref 9.2–11.8)
POTASSIUM SERPL-SCNC: 4 MMOL/L (ref 3.5–5.5)
RBC # BLD AUTO: 3.95 M/UL (ref 4.2–5.3)
SODIUM SERPL-SCNC: 140 MMOL/L (ref 136–145)
WBC # BLD AUTO: 8.1 K/UL (ref 4.6–13.2)

## 2025-03-25 PROCEDURE — 94640 AIRWAY INHALATION TREATMENT: CPT

## 2025-03-25 PROCEDURE — 6370000000 HC RX 637 (ALT 250 FOR IP): Performed by: HOSPITALIST

## 2025-03-25 PROCEDURE — 2500000003 HC RX 250 WO HCPCS: Performed by: INTERNAL MEDICINE

## 2025-03-25 PROCEDURE — 80069 RENAL FUNCTION PANEL: CPT

## 2025-03-25 PROCEDURE — 6360000002 HC RX W HCPCS: Performed by: INTERNAL MEDICINE

## 2025-03-25 PROCEDURE — 82962 GLUCOSE BLOOD TEST: CPT

## 2025-03-25 PROCEDURE — 6370000000 HC RX 637 (ALT 250 FOR IP): Performed by: INTERNAL MEDICINE

## 2025-03-25 PROCEDURE — 85025 COMPLETE CBC W/AUTO DIFF WBC: CPT

## 2025-03-25 PROCEDURE — 36415 COLL VENOUS BLD VENIPUNCTURE: CPT

## 2025-03-25 RX ADMIN — PREDNISONE 10 MG: 10 TABLET ORAL at 08:31

## 2025-03-25 RX ADMIN — SODIUM CHLORIDE, PRESERVATIVE FREE 10 ML: 5 INJECTION INTRAVENOUS at 08:33

## 2025-03-25 RX ADMIN — IPRATROPIUM BROMIDE 0.5 MG: 0.5 SOLUTION RESPIRATORY (INHALATION) at 08:37

## 2025-03-25 RX ADMIN — OXYCODONE AND ACETAMINOPHEN 1 TABLET: 325; 5 TABLET ORAL at 10:48

## 2025-03-25 RX ADMIN — ARFORMOTEROL TARTRATE 15 MCG: 15 SOLUTION RESPIRATORY (INHALATION) at 08:37

## 2025-03-25 RX ADMIN — DONEPEZIL HYDROCHLORIDE 10 MG: 5 TABLET, FILM COATED ORAL at 08:30

## 2025-03-25 RX ADMIN — METOPROLOL TARTRATE 100 MG: 50 TABLET, FILM COATED ORAL at 08:30

## 2025-03-25 RX ADMIN — TROSPIUM CHLORIDE 20 MG: 20 TABLET, FILM COATED ORAL at 07:32

## 2025-03-25 RX ADMIN — PREGABALIN 200 MG: 100 CAPSULE ORAL at 08:30

## 2025-03-25 RX ADMIN — APIXABAN 5 MG: 5 TABLET, FILM COATED ORAL at 08:30

## 2025-03-25 RX ADMIN — PANTOPRAZOLE SODIUM 40 MG: 40 TABLET, DELAYED RELEASE ORAL at 08:30

## 2025-03-25 RX ADMIN — BISACODYL 5 MG: 5 TABLET, COATED ORAL at 08:30

## 2025-03-25 RX ADMIN — BUDESONIDE 1000 MCG: 0.5 INHALANT RESPIRATORY (INHALATION) at 08:36

## 2025-03-25 ASSESSMENT — PAIN DESCRIPTION - PAIN TYPE: TYPE: ACUTE PAIN

## 2025-03-25 ASSESSMENT — PAIN DESCRIPTION - DESCRIPTORS: DESCRIPTORS: ACHING

## 2025-03-25 ASSESSMENT — PAIN DESCRIPTION - ORIENTATION
ORIENTATION: RIGHT;LEFT
ORIENTATION: LEFT
ORIENTATION: LEFT

## 2025-03-25 ASSESSMENT — PAIN DESCRIPTION - LOCATION
LOCATION: FOOT
LOCATION: FOOT
LOCATION: LEG

## 2025-03-25 ASSESSMENT — PAIN SCALES - GENERAL
PAINLEVEL_OUTOF10: 5
PAINLEVEL_OUTOF10: 5
PAINLEVEL_OUTOF10: 2

## 2025-03-25 NOTE — PROGRESS NOTES
Hospitalist Progress Note    Patient: Lorena Nichole MRN: [id number]  CSN: [id number]    YOB: 1955  Age: 69 y.o.  Sex: female    DOA: 3/21/2025 LOS:  LOS: 3 days         Total duration of encounter: 3 days      Chief Complaint ;  Patient presented to ER due to frequent fall also physical deconditioning.    Subjective ;   I feel fine and had good night, need wound change ,  ready to go to rehab   Rn can not have mri brain     Review of systems:  Constitutional: denies fevers, chills, myalgias  Respiratory: denies SOB, cough  Cardiovascular: denies chest pain, palpitations  Gastrointestinal: denies nausea, vomiting, diarrhea      10 systems reviewed, all negative other than what is mentioned above.      Vital signs/Intake and Output:  Visit Vitals  /69   Pulse 59   Temp 97.7 °F (36.5 °C) (Oral)   Resp 18   Ht 1.549 m (5' 0.98\")   Wt 102.1 kg (225 lb)   SpO2 98%   BMI 42.54 kg/m²     Current Shift:  No intake/output data recorded.  Last three shifts:  03/22 1901 - 03/24 0700  In: 420 [P.O.:420]  Out: 301 [Urine:301]    Exam:    General: Well developed, alert, NAD, OX3  Head/Neck: NCAT, supple, No masses, No lymphadenopathy  CVS:Regular rate and rhythm, no M/R/G, S1/S2 heard, no thrill  Lungs:Clear to auscultation bilaterally, no wheezes, rhonchi, or rales  Abdomen: Soft, Nontender, No distention, Normal Bowel sounds, No hepatomegaly  Extremities: No C/C/ leg wrapped  pulses palpable 2+  Skin:normal texture and turgor, no rashes, no lesions  Neuro:grossly normal , follows commands  Psych:appropriate       Labs: Results:       Chemistry Recent Labs     03/21/25  1500 03/22/25  0600 03/23/25  0312 03/24/25  0516   GLUCOSE 186* 205* 166* 153*    137 137 139   K 3.7 4.0 4.1 4.2    103 105 106   CO2 29 29 28 28   BUN 10 11 14 16   CREATININE 0.96 0.70 0.68 0.60   CALCIUM 8.7 8.6 8.4* 8.8   BILITOT 0.4  --   --   --    AST 24  --   --   --    ALT 37  --   --   --    ALKPHOS 99  --   --   
    Hospitalist Progress Note    Patient: Lorena Nichole MRN: [id number]  CSN: [id number]    YOB: 1955  Age: 69 y.o.  Sex: female    DOA: 3/21/2025 LOS:  LOS: 1 day         Total duration of encounter: 1 day      Chief Complaint ;  Patient presented to ER due to frequent fall also physical deconditioning.    Subjective ;  I have several falls and I have the stitches on legs and visited wound clinic for wound, I want to go to rehab, sister went to [facility] I want to talk with     Rn want to see vascular     Review of systems:  Constitutional: denies fevers, chills, myalgias  Respiratory: denies SOB, cough  Cardiovascular: denies chest pain, palpitations  Gastrointestinal: denies nausea, vomiting, diarrhea  Derm would in legs     10 systems reviewed, all negative other than what is mentioned above.      Vital signs/Intake and Output:  Visit Vitals  BP (!) 115/59   Pulse 55   Temp 97.6 °F (36.4 °C) (Oral)   Resp 18   Ht 1.549 m (5' 1\")   Wt 102.1 kg (225 lb)   SpO2 98%   BMI 42.51 kg/m²     Current Shift:  No intake/output data recorded.  Last three shifts:  03/20 1901 - 03/22 0700  In: 250 [P.O.:240; I.V.:10]  Out: -     Exam:    General: Well developed, alert, NAD, OX3  Head/Neck: NCAT, supple, No masses, No lymphadenopathy  CVS:Regular rate and rhythm, no M/R/G, S1/S2 heard, no thrill  Lungs:Clear to auscultation bilaterally, no wheezes, rhonchi, or rales  Abdomen: Soft, Nontender, No distention, Normal Bowel sounds, No hepatomegaly  Extremities: No C/C/E, pulses palpable 2+  Skin:normal texture and turgor, no rashes, no lesions  Neuro:grossly normal , follows commands  Psych:appropriate     Media Information      Document Information      Labs: Results:       Chemistry Recent Labs     03/21/25  1500   GLUCOSE 186*      K 3.7      CO2 29   BUN 10   CREATININE 0.96   CALCIUM 8.7   BILITOT 0.4   AST 24   ALT 37   ALKPHOS 99   GLOB 3.8   LABGLOM 64      CBC w/Diff Recent 
    PHYSICAL THERAPY TREATMENT    Patient: Lorena Nichole (69 y.o. female)  Date: 3/23/2025  Diagnosis: PVD (peripheral vascular disease) [I73.9]  Essential hypertension [I10]  Physical deconditioning [R53.81]  Anticoagulated [Z79.01]  Frequent falls [R29.6]  Intractable pain [R52] Intractable pain      Precautions: Fall Risk,  ,  ,  ,  ,  ,  ,        ASSESSMENT:  Pt reports feeling of significant improvement and wanting to avoid placement. She is able to improve in all areas and demonstrates some ambulation. Standing marching show R>L foot clearance, with no C/o pain during today's session. Pt report L foot pain and needing compression hose. Retrieved and supplied for nurse to bette after bathing assistance. Pt agrees that she will need to increase distance and overall strength. Will F/u. Placement still appropriate at this time.      Progression toward goals: Fair  [x]      Improving appropriately and progressing toward goals  []      Improving slowly and progressing toward goals  []      Not making progress toward goals and plan of care will be adjusted     PLAN:  Patient continues to benefit from skilled intervention to address the above impairments.  Continue treatment per established plan of care.    Further Equipment Recommendations for Discharge: gait belt and rolling walker    [facility]:   AM-PAC Inpatient Mobility without Stair Climbing Raw Score : 16    Current research shows that an AM-PAC score of 17 (13 without stairs) or less is not associated with a discharge to the patient's home setting. Based on an AM-PAC score and their current functional mobility deficits, it is recommended that the patient have 5-7 sessions per week of Physical Therapy at d/c to increase the patient's independence.  Currently, this patient demonstrates the potential endurance, and/or tolerance for 3 hours of therapy each day at d/c.    Current research shows that an AM-PAC score of 17 (13 without stairs) or less is not associated 
1002 MRI screening for completed with pt, per pt she has a neuro-stimulator that is not compatible with MRI machines, specifically the wires. Pt is unable to say which stimulator she has placed and does not have any documentation on her. MD burton, MRI to be held.   
1128 Pt requested Tylenol for 4/10 headache.     1827 Pt still waiting on authorization for [facility].  
1259 Pt IV removed and AVS reviewed with patient. Report called to facility  and given to Queta HERNANDEZ.  
23:00 Shift assessment completed. See nsg flow sheet for details.  
23:05 Shift assessment completed. See nsg flow sheet for details.    02:45 Reassessed with 0 changes noted. Resting quietly in bed with eyes closed between cares. Remains incontinent with pure wick & brief.    03:45 Now c/o double vision. Perfected served Dr Urban & to the floor to exam.    07:15 Bedside and Verbal shift change report given to RAGINI Atkinson RN (oncoming nurse) by SONIA Hinkle RN (offgoing nurse). Report included the following information Nurse Handoff Report.     
Bedside and Verbal shift change report given to DAVID Cook (oncoming nurse) by DAVID Connolly (offgoing nurse). Report included the following information Nurse Handoff Report, ED Encounter Summary, Adult Overview, Intake/Output, MAR, and Recent Results.     
Bedside and Verbal shift change report given to DAVID Richard (oncoming nurse) by DAVID Castanon (offgoing nurse). Report included the following information Nurse Handoff Report, Index, ED SBAR, Adult Overview, Surgery Report, Intake/Output, MAR, Recent Results, and Med Rec Status.    
Bedside and Verbal shift change report given to DAVID Richard (oncoming nurse) by DAVID Connolly (offgoing nurse). Report included the following information Nurse Handoff Report, Adult Overview, Intake/Output, MAR, and Recent Results.       
Care  assisting Jennifer Sánchez RN with Discharge Planning needs for patient.  Authorization has been obtained for [facility].  Updates sent to [facility] for review.  Will send Discharge Summary and updates once available.    Will follow for further needs.  
Care  assisting Jennifer Sánchez RN with Discharge Planning needs for patient.  Updates sent to area Skilled Nursing Facilities, Acute Rehabilitation facilities and area Home Health Agencies for review and consideration for placement and/or acceptance for home care needs.    Will follow for further needs and discharge plans.  
Care  assisting Jennifer Sánchez, RN, Care Mgr with Discharge Planning needs for patient.  This writer will assist with authorization for patient for [facility] via [employer].    Pre-cert Request   3/24/2025, 2:42 PM    Patient Name: Lorena Nichole                   YOB: 1955      *ALERT - Authorization is pending review by the insurance company.  This is not an authorization.*    Submitted via [employer].  Requested Facility:  [facility]  Anticipated Admit Date to SNF:  03/24/2025  Pending Auth #:  Unknown  Pending Plan Auth ID:  Unknown    MIGUEL YOUNGBLOOD  Case Management Department  Ph: [phone] Fax: [phone]  
ED provider called regarding possible assistance upon DC. Patient to have PT/OT assessment in ED. Based on results, CM to assist with hhc vs SNF referrals. SW to follow.      2:50- PT did eval patient and stated that patient does need SNF/rehab upon DC. Referrals placed. Auth needed prior to DC.    
Physical Therapy Goals:  Initiated 3/21/2025 to be met within 7-10 days.  Short Term Goals  Short Term Goal 1: Patient will perform supine to/from sit with independence  Short Term Goal 2: Patient will perform sit to/from stand with supervision in prep for gait progression  Short Term Goal 3: Patient will ambulate 50 ft with LRAD and supervision to progress OOB mobility  PHYSICAL THERAPY EVALUATION    Patient: Lorena Nichole (69 y.o. female)  Date: 3/21/2025  Diagnosis: No admission diagnoses are documented for this encounter. <principal problem not specified>  Precautions: Fall Risk  PLOF: Ambulation with rollator. Lives with  and son in 2 story home with 1 MONO and stair lift to second floor.    ASSESSMENT :  Patient received supine on stretcher. Pt presents with decreased LE strength, decreased endurance, decreased gait quailty, decreased activity tolerance, decreased bed mobility and transfers, impaired balance, and high pain levels limiting functional mobility. Patient with occasional stuttering speech-pt is very concerns about this. Patient with L LE pain, xrays negative for acute fracture. Patient performed supine to sit with ModA. Patient performed sit to/from stand with MaxA. Patient ambulated 3 ft with rolling walker and MaxA for stability and balance and prevent fall. L LE buckling. Patient is a high fall risk and is unable to stand or ambulate without significant external assist. Patient appears below baseline level and would benefit from continued skilled PT to progress functional mobility. Patient may benefit from rehab/SNF placement at d/c.     DEFICITS/IMPAIRMENTS:   Body Structures, Functions, Activity Limitations Requiring Skilled Therapeutic Intervention: Decreased functional mobility ;Decreased ROM;Decreased strength;Decreased endurance;Decreased balance;Increased pain    Patient will benefit from skilled intervention to address the above impairments.  Patient's rehabilitation 
SNF Authorization  3/25/2025, 8:11 AM    Patient Name: Lorena Nichole                   YOB: 1955      Received via: [email or web address]  Auth ID:  [id number]  Plan Auth ID: [id number]  Service:  SNF [facility]  Approval Dates: 03/24/2025  Next Review Date: 03/26/2025        MIGUEL SWANNHIP  Case Management Department  Ph: [phone] Fax: [phone]    This writer spoke with Moraima at [employer] at [phone] option [phone] who informed us that authorization has been obtained for [facility].      Jennifer Sánchez RN, Care Mgr made aware.    Will follow for further discharge planning needs.    
TRANSFER - IN REPORT:    Verbal report received from Britany on Lorena ARANA Nichole  being received from ED for routine progression of patient care      Report consisted of patient's Situation, Background, Assessment and   Recommendations(SBAR).     Information from the following report(s) Nurse Handoff Report, ED Encounter Summary, ED SBAR, Adult Overview, and Recent Results was reviewed with the receiving nurse.    Opportunity for questions and clarification was provided.      2140    Assessment completed upon patient's arrival to unit and care assumed.    
0.4  --   --    AST 24  --   --    ALT 37  --   --    ALKPHOS 99  --   --    GLOB 3.8  --   --    LABGLOM 64 >90 >90      CBC w/Diff Recent Labs     03/21/25  1500 03/22/25  0600   WBC 8.5 6.1   RBC 4.55 3.89*   HGB 13.7 11.9*   HCT 42.5 37.0    273   LYMPHOPCT 20.2*  --       Cardiac Enzymes Recent Labs     03/22/25  0600   CKTOTAL 111      Coagulation No results for input(s): \"PROTIME\", \"INR\", \"APTT\" in the last 72 hours.    Lipid Panel No results found for: \"CHOL\", \"TRIG\", \"HDL\", \"VLDL\", \"CHOLHDLRATIO\"   BNP No results for input(s): \"BNP\" in the last 72 hours.   Liver Enzymes Recent Labs     03/21/25  1500   ALT 37   AST 24   ALKPHOS 99   BILITOT 0.4      Thyroid Studies Lab Results   Component Value Date/Time    TSH 1.95 03/21/2025 03:00 PM          Procedures/imaging: see electronic medical records for all procedures/Xrays and details which were not copied into this note but were reviewed prior to creation of Plan         Assessment/Plan     Principal Problem:    Intractable pain and physical deconditioning  Active Problems:    Ambulatory dysfunction    Physical deconditioning    GERD (gastroesophageal reflux disease)    Chronic back pain    PVD (peripheral vascular disease)    Insufficiency, adrenal    Severe obesity    Hypercholesterolemia    Hypertension    Sleep apnea    Anticoagulated    Eczema    Diabetes mellitus, type 2 (HCC)    History of DVT (deep vein thrombosis)    Chronic diastolic (congestive) heart failure ([facility])    Cerebral AVM    History of stroke    COPD (chronic obstructive pulmonary disease) ([facility])  Resolved Problems:    * No resolved hospital problems. *       Intractable pain and physical deconditioning, ambulatory dysfunction  Continue pain control  PT OT evaluation  on board fall precaution  Likely need to have rehab placement   Case manage on board     Blurred vision   Neuro on board  Repeated ct head no change   She is not a candidate for mri brain due to pain 
Body Wt: 102 kg (224 lb 13.9 oz)   BMI: 42.5 class III extreme obesity     Nutrition Diagnosis:   Increased nutrient needs, Altered nutrition-related lab values, Overweight/obese related to cognitive or neurological impairment, increase demand for energy/nutrients, cardiac dysfunction, endocrine dysfunction as evidenced by wounds, BMI, localized or generalized fluid accumulation, lab values    Nutrition Interventions:   Food and/or Nutrient Delivery: Continue Oral Nutrition Supplement, Continue Current Diet, Vitamin Supplement  Nutrition Education/Counseling: Education/Counseling not indicated  Coordination of Nutrition Care: Continue to monitor while inpatient    Goals:  Goals: Meet at least 75% of estimated needs (prevent skin breakdown, promote wound healing)  Type of Goal: New goal  Previous Goal Met: New Goal    Nutrition Monitoring and Evaluation:   Behavioral-Environmental Outcomes: None Identified  Food/Nutrient Intake Outcomes: Progression of Nutrition, Food and Nutrient Intake, Vitamin/Mineral Intake, Supplement Intake  Physical Signs/Symptoms Outcomes: Skin, Weight, Fluid Status or Edema, Biochemical Data    Discharge Planning:    Too soon to determine     Alpa Graham MS, RD  Clinical Dietitian  E: [email or web address]  O:  [phone]  C:  [phone]  
for Discharge:     [facility]: 11/24    This AMPAC score should be considered in conjunction with interdisciplinary team recommendations to determine the most appropriate discharge setting. Patient's social support, diagnosis, medical stability, and prior level of function should also be taken into consideration.     SUBJECTIVE:   Patient stated Subjective: I am going to need my bandaged changed soon, will they come to me here to do it?    OBJECTIVE DATA SUMMARY:   Critical Behavior:     WNL      Functional Mobility Training:  Bed Mobility:  Bed Mobility Training  Bed Mobility Training: Yes  Supine to Sit: Minimal assistance  Sit to Supine: Minimal assistance  Transfers:  Transfer Training  Transfer Training: Yes  Sit to Stand: Minimal assistance  Stand to Sit: Contact guard assistance;Minimal assistance  Balance:  Balance  Sitting: Intact  Sitting - Static: Good (unsupported)  Sitting - Dynamic: Fair (occasional)  Standing: Impaired;With support  Standing - Static: Fair  Standing - Dynamic: Fair;Poor   Ambulation/Gait Training:    Gait Training  Right Side Weight Bearing: As tolerated  Left Side Weight Bearing: As tolerated  Gait  Gait Training: Yes  Left Side Weight Bearing: As tolerated  Right Side Weight Bearing: As tolerated  Overall Level of Assistance: Minimal assistance;Partial/Moderate assistance  Distance (ft): 8 Feet  Assistive Device: Gait belt;Walker, rolling  Speed/Sho: Slow;Delayed  Step Length: Right shortened;Left shortened  Gait Abnormalities: Antalgic;Decreased step clearance;Step to gait  Therapeutic Exercises:   (B)LEs    EXERCISE   Sets   Reps   Active Active Assist   Passive Self ROM   Comments   Ankle Pumps    [] [] [] []    Quad Sets/Glut Sets    [] [] [] [] Hold for 5 secs   Hamstring Sets   [] [] [] []    Short Arc Quads   [] [] [] []    Heel Slides   [] [] [] []    Straight Leg Raises   [] [] [] []    Hip Add  10 [x] [] [] [] Hold for 5 secs, w/ pillow squeeze   Long Arc Quads  10 [x] [] 
patient difficulty with word finding and some stuttering.   DEFICITS/IMPAIRMENTS:  Performance deficits / Impairments: Decreased functional mobility ;Decreased endurance;Decreased coordination;Decreased ADL status;Decreased balance;Decreased strength;Decreased high-level IADLs;Decreased cognition    Patient will benefit from skilled intervention to address the above impairments.  Patient's rehabilitation potential is considered to be Prognosis: Fair.  Factors which may influence rehabilitation potential include:   []             None noted  [x]             Mental ability/status  [x]             Medical condition  [x]             Home/family situation and support systems  [x]             Safety awareness  [x]             Pain tolerance/management  []             Other:      PLAN :  Recommendations and Planned Interventions:      [x]               Self Care/ ADL Training         [x]      Therapeutic Activities  [x]               Functional Mobility Training   []      Cognitive Retraining  [x]               Therapeutic Exercises           [x]      Endurance Activities  [x]               Balance Training                    []      Neuromuscular Re-Education  []               Visual/Perceptual Training     [x]      Home Safety Training  [x]               Patient Education                   [x]      Family Training/Education  [x]               Strengthening                        [x]      ROM  []               Wheelchair mobility                [x]     Pain management  [x]               Safety education/training       [x]     Positioning  [x]              Equipment education              []     Return to work training  [x]              Coordination training              []      Energy conservation  [x]              Home management training and IADLs  []               Other (comment):  Frequency/Duration: Patient will be followed by occupational therapy to address goals, 1 time per day/3-5 days per week to address

## 2025-03-25 NOTE — PLAN OF CARE
Discharge Instructions from  In Patient Wound Care Nurse at [facility]   [address]   [address]  [place] [address]  [phone] Fax [phone]    NAME:  Lorena Nichole  YOB: 1955  MEDICAL RECORD NUMBER:  [id number]  DATE:  3/21/2025    Wound Cleansing:   Do not scrub or use excessive force.  Cleanse wound prior to applying a clean dressing with:  [x] Wound Cleanser  or [x] Cleanse wound with Mild Soap & Water        Topical Treatments:  Do not apply lotions, creams, or ointments to wound bed unless directed.   [x] Apply moisturizing lotion to skin surrounding the wound prior to dressing change.    Dressings:           Wound Location right lower leg   [x] Apply Primary Dressing:          [x] Collagen   [x] Hydrofera Blue     [x] Cover and Secure with:     [x] Gauze [x] Maris     Avoid contact of tape with skin.    [x] Change dressing:   [x] weekly with compression wrap change     Compression:  Apply: [x] Multilayer Compression Wrap [x]RightLeg [x]Left Leg weekly   [x] Multi-layer compression.  Do not get leg(s) with wrap wet.  If wraps become too tight call the center or completely remove the wrap.      [x] Elevate leg(s) above the level of the hips when sitting or in the bed   [x] Avoid prolonged standing in one place.         Activity:  [x] Activity as tolerated:  :             Return Appointment:    [x] Return Appointment: With   Future Appointments   Date Time Provider Department Center   4/7/2025  9:30 AM Nayely Rebolledo MD [facility]             Electronically signed Eugenia Barrientos RN on 3/25/2025 at 11:58 AM

## 2025-03-25 NOTE — CARE COORDINATION
1:40 PM Patient's first choice for rehab was [facility]. Tonya Rosas at [facility] met with patient on floor and sent to their medical team to review. CM just alerted by Tonya that they have to decline due to does not meet criteria. Patient has accepting SNFs, CM presented accepting SNFs to patient and instructed her that CM will come back in about an hour to obtain her choice so that we can start authorization. Patient verbalized understanding.    2:37 PM- Patient selected [facility]. Booked in [facility] and CM left  for [facility] alerting them that patient selected their facility. CM alerted CMS and authorization has been started.  
Medicare pt has received, reviewed, and signed 2nd Important Message from Medicare letter informing them of their right to appeal the discharge.  Signed copy has been placed on pt bedside chart.    Patient acknowledged understanding of letter and right to appeal.  Copy of letter given to patient at bedside.  Original letter placed in chart.  
SNF Authorization Request  3/24/2025, 2:34 PM    Patient Name: Lorena Nichole                   YOB: 1955    Patient has been provided with freedom of choice and has chosen to go to [facility] [place], [facility] by [facility]    SNF has confirmed that they can accept the patient and they have a bed Yes  SNF has confirmed that they are in network with the patient's insurance Yes    Please request authorization.    Estimated date of discharge is 03/24/2025    Skilled Need    PT Yes   OT Yes   Speech therapy No   Wound Care Yes: Comment: See wound note  IV MedicationsNo  Trach No   Peg No     If PT/ OT/ Speech is required, evaluations/ notes have been updated within the last 48 hours Yes       Additional information N/A    Payor: Payor: HU[employer]NA MEDICARE / Plan: HUMANA CHOICE-PPO MEDICARE / Product Type: *No Product type* /   Attending physician: Bárbara Connor MD Jennifer L Slocum  Case Management Department  Ph: [phone]    
Transition of Care Plan to SNF/Rehab      Patient Name: Lorena Nichole                   YOB: 1955    SNF/Rehab Transition:  Patient has been accepted to [facility] and meets criteria for admission. Confirmed with Leslie Llanes that bed is available for today.   Patient will transported by [facility] and expected to leave at 12:30 PM.  Met with patient and she is agreeable to the transition plan.    Medicaid Long-term Services and Supports(LTSS) screening completion: NA    Three Inpatient Midnights for Medicare: Yes    Current Code Status:   Code Status: DNR     Last Weight:   Wt Readings from Last 1 Encounters:   03/21/25 102.1 kg (225 lb)       Weightbearing Status: FWB    IV Medication, IV Site, Device Type: No    Dialysis: No      O2 Needs (including O2, Bipap, Cpap, ect.): No    Covid Vaccine Dates/ if known:    Internal Administration   First Dose COVID-19, PFIZER PURPLE top, DILUTE for use, (age 12 y+), 30mcg/0.3mL  02/05/2021   Second Dose COVID-19, PFIZER PURPLE top, DILUTE for use, (age 12 y+), 30mcg/0.3mL   02/26/2021       Last COVID Lab No results found for: \"SARS-COV-2\"         Last COVID Lab No results found for: \"SARS-COV-2\", \"SARS-COV-2 RNA\", \"SARS-COV-2 RNA, RT PCR\", \"SARS-COV-2, SWETHA\", \"SARS-COV-2, NAAT\", \"SARS-COV-2 BY PCR\", \"RAPID\", \"SARS-COV-2, RAPID\", \"SALIVA\", \"SARS-COV-2, SALIVA\"           Current Diet: ADULT DIET; Regular; 4 carb choices (60 gm/meal); Low Fat/Low Chol/High Fiber/2 gm Na; Low Fat (less than or equal to 50 gm/day); High Fiber  ADULT ORAL NUTRITION SUPPLEMENT; Breakfast; Diabetic Oral Supplement    Wound Vac or Other Equipment Needs: No    Patient requires Isolation: Yes: Comment: Contact     Follow-up Appointment needed or scheduled: Yes: Comment: See AVS    Communication to SNF/Rehab:  Bedside RN has been notified to update the transition plan to the facility and call report [phone] .  Discharge information has been updated on the AVS 
Writer spoke with TAN nichole regarding possible rehab placement.  Writer informed PA that patient will require insurance  auth in order to go to a SNF.    Writer will inform the weekend team to begin the SNF search.  
At/After Discharge   Transition of Care Consult (CM Consult) Home Health   Internal Home Health Yes   Services At/After Discharge Skilled Nursing Facility (SNF)   [place] Resource Information Provided? No   Mode of Transport at Discharge Self   Confirm Follow Up Transport Self   Condition of Participation: Discharge Planning   The Plan for Transition of Care is related to the following treatment goals: Dispo pending PT/OT eval   The Patient and/or Patient Representative was provided with a Choice of Provider? Patient   The Patient and/Or Patient Representative agree with the Discharge Plan? Yes   Freedom of Choice list was provided with basic dialogue that supports the patient's individualized plan of care/goals, treatment preferences, and shares the quality data associated with the providers?  Yes     MSW sent IPR, SNF and HH referrals.    PEÑA Ta, Supervisee in Social Work  Inpatient Case Mananger

## 2025-03-25 NOTE — PLAN OF CARE
Problem: Chronic Conditions and Co-morbidities  Goal: Patient's chronic conditions and co-morbidity symptoms are monitored and maintained or improved  3/25/2025 1307 by Isael Meyer, RN  Outcome: Adequate for Discharge  3/25/2025 0436 by Cathleen Mims RN  Outcome: Progressing     Problem: Skin/Tissue Integrity  Goal: Skin integrity remains intact  Description: 1.  Monitor for areas of redness and/or skin breakdown  2.  Assess vascular access sites hourly  3.  Every 4-6 hours minimum:  Change oxygen saturation probe site  4.  Every 4-6 hours:  If on nasal continuous positive airway pressure, respiratory therapy assess nares and determine need for appliance change or resting period  3/25/2025 1307 by Isael Meyer, RN  Outcome: Adequate for Discharge     Problem: Safety - Adult  Goal: Free from fall injury  3/25/2025 1307 by Isael Meyer, RN  Outcome: Adequate for Discharge  3/25/2025 0436 by Cathleen Mims RN  Outcome: Progressing     Problem: ABCDS Injury Assessment  Goal: Absence of physical injury  3/25/2025 1307 by sIael Meyer, RN  Outcome: Adequate for Discharge  3/25/2025 0436 by Cathleen Mims RN  Outcome: Progressing     Problem: Pain  Goal: Verbalizes/displays adequate comfort level or baseline comfort level  3/25/2025 1307 by Isael Meyer, RN  Outcome: Adequate for Discharge  3/25/2025 0436 by Cathleen Mims RN  Outcome: Progressing

## 2025-04-07 ENCOUNTER — HOSPITAL ENCOUNTER (OUTPATIENT)
Facility: HOSPITAL | Age: 70
Discharge: HOME | End: 2025-04-07
Payer: MEDICARE

## 2025-04-07 VITALS
RESPIRATION RATE: 18 BRPM | DIASTOLIC BLOOD PRESSURE: 56 MMHG | TEMPERATURE: 97.7 F | SYSTOLIC BLOOD PRESSURE: 126 MMHG | HEART RATE: 69 BPM

## 2025-04-07 DIAGNOSIS — R60.0 LOWER EXTREMITY EDEMA: Primary | ICD-10-CM

## 2025-04-07 DIAGNOSIS — I87.2 PERIPHERAL VENOUS INSUFFICIENCY: ICD-10-CM

## 2025-04-07 DIAGNOSIS — L97.915 ULCER OF RIGHT LOWER EXTREMITY WITH MUSCLE INVOLVEMENT WITHOUT EVIDENCE OF NECROSIS: ICD-10-CM

## 2025-04-07 PROCEDURE — 11042 DBRDMT SUBQ TIS 1ST 20SQCM/<: CPT

## 2025-04-07 PROCEDURE — 6370000000 HC RX 637 (ALT 250 FOR IP): Performed by: FAMILY MEDICINE

## 2025-04-07 RX ORDER — SODIUM CHLOR/HYPOCHLOROUS ACID 0.033 %
SOLUTION, IRRIGATION IRRIGATION PRN
OUTPATIENT
Start: 2025-04-07

## 2025-04-07 RX ORDER — LIDOCAINE HYDROCHLORIDE 20 MG/ML
JELLY TOPICAL PRN
OUTPATIENT
Start: 2025-04-07

## 2025-04-07 RX ORDER — GENTAMICIN SULFATE 1 MG/G
OINTMENT TOPICAL PRN
OUTPATIENT
Start: 2025-04-07

## 2025-04-07 RX ORDER — GINSENG 100 MG
CAPSULE ORAL PRN
OUTPATIENT
Start: 2025-04-07

## 2025-04-07 RX ORDER — LIDOCAINE 50 MG/G
OINTMENT TOPICAL PRN
OUTPATIENT
Start: 2025-04-07

## 2025-04-07 RX ORDER — BETAMETHASONE DIPROPIONATE 0.5 MG/G
CREAM TOPICAL PRN
OUTPATIENT
Start: 2025-04-07

## 2025-04-07 RX ORDER — BACITRACIN ZINC AND POLYMYXIN B SULFATE 500; 1000 [USP'U]/G; [USP'U]/G
OINTMENT TOPICAL PRN
OUTPATIENT
Start: 2025-04-07

## 2025-04-07 RX ORDER — LIDOCAINE HYDROCHLORIDE 40 MG/ML
SOLUTION TOPICAL PRN
OUTPATIENT
Start: 2025-04-07

## 2025-04-07 RX ORDER — MUPIROCIN 20 MG/G
OINTMENT TOPICAL PRN
OUTPATIENT
Start: 2025-04-07

## 2025-04-07 RX ORDER — SILVER SULFADIAZINE 10 MG/G
CREAM TOPICAL PRN
OUTPATIENT
Start: 2025-04-07

## 2025-04-07 RX ORDER — LIDOCAINE HYDROCHLORIDE 20 MG/ML
JELLY TOPICAL PRN
Status: DISCONTINUED | OUTPATIENT
Start: 2025-04-07 | End: 2025-04-08 | Stop reason: HOSPADM

## 2025-04-07 RX ORDER — NEOMYCIN/BACITRACIN/POLYMYXINB 3.5-400-5K
OINTMENT (GRAM) TOPICAL PRN
OUTPATIENT
Start: 2025-04-07

## 2025-04-07 RX ORDER — TRIAMCINOLONE ACETONIDE 1 MG/G
OINTMENT TOPICAL PRN
OUTPATIENT
Start: 2025-04-07

## 2025-04-07 RX ORDER — LIDOCAINE 40 MG/G
CREAM TOPICAL PRN
OUTPATIENT
Start: 2025-04-07

## 2025-04-07 RX ORDER — CLOBETASOL PROPIONATE 0.5 MG/G
OINTMENT TOPICAL PRN
OUTPATIENT
Start: 2025-04-07

## 2025-04-07 RX ADMIN — LIDOCAINE HYDROCHLORIDE: 20 JELLY TOPICAL at 10:23

## 2025-04-07 ASSESSMENT — PAIN SCALES - GENERAL: PAINLEVEL_OUTOF10: 2

## 2025-04-07 NOTE — FLOWSHEET NOTE
04/07/25 0951   Wound 02/10/25 Pretibial Right   Date First Assessed/Time First Assessed: 02/10/25 1103   Present on Original Admission: Yes  Primary Wound Type: Traumatic  Secondary Wound Type - Traumatic: Skin Tear  Location: Pretibial  Wound Location Orientation: Right   Wound Image     Wound Etiology Traumatic   Dressing Status New dressing applied   Wound Cleansed Cleansed with saline   Dressing/Treatment Collagen;Alginate with Ag  (2 layer wrap)   Offloading for Diabetic Foot Ulcers Offloading not required   Dressing Change Due 04/14/25   Wound Length (cm) 0.4 cm   Wound Width (cm) 0.7 cm   Wound Depth (cm) 0.3 cm   Wound Surface Area (cm^2) 0.28 cm^2   Change in Wound Size % (l*w) 98.44   Wound Volume (cm^3) 0.084 cm^3   Wound Healing % 98   Post-Procedure Length (cm) 0.4 cm   Post-Procedure Width (cm) 0.7 cm   Post-Procedure Depth (cm) 0.5 cm   Post-Procedure Surface Area (cm^2) 0.28 cm^2   Post-Procedure Volume (cm^3) 0.14 cm^3   Wound Assessment Devitalized tissue   Drainage Amount Small (< 25%)   Drainage Description Serous   Odor None   Sneha-wound Assessment Intact   Margins Defined edges   Wound Thickness Description not for Pressure Injury Full thickness     Multilayer Compression Wrap   (Not Unna) Below the Knee    NAME:  Lorena Nichole  YOB: 1955  MEDICAL RECORD NUMBER:  [id number]  DATE:  4/7/2025    Multilayer compression wrap: Removed old Multilayer wrap if indicated and wash leg with mild soap/water.  Applied moisturizing agent to dry skin as needed.   Applied primary and secondary dressing as ordered.  Applied multilayered dressing below the knee to right lower leg.  Instructed patient/caregiver not to remove dressing and to keep it clean and dry.   Instructed patient/caregiver on complications to report to provider, such as pain, numbness in toes, heavy drainage, and slippage of dressing.  Instructed patient on purpose of compression dressing and on activity and exercise

## 2025-04-07 NOTE — DISCHARGE INSTRUCTIONS
Discharge Instructions from  Wound Care Clinic at  [place], [place] [address] [phone] Fax [phone]        Return Appointment:  [] Wound and dressing supply provider:   [] ECF or Home Healthcare:  [] Wound Assessment: [] Physician or NP scheduled for Wound Assessment:   [x] Return Appointment: With MD  in  1 Week(s)  [] Ordered tests:       Wound Care Center Information: Should you experience any significant changes in your wound(s) or have questions about your wound care, please contact the [facility] at MONDAY - FRIDAY 8:00 am - 4:30.  If you need help with your wound outside these hours and cannot wait until we are again available, contact your PCP or go to the hospital emergency room.     PLEASE NOTE: IF YOU ARE UNABLE TO OBTAIN WOUND SUPPLIES, CONTINUE TO USE THE SUPPLIES YOU HAVE AVAILABLE UNTIL YOU ARE ABLE TO REACH US. IT IS MOST IMPORTANT TO KEEP THE WOUND COVERED AT ALL TIMES.

## 2025-04-07 NOTE — PROGRESS NOTES
Wound Center  Progress Note / Procedure Note      Chief Complaint:  Lorena Nichole is a 69 y.o.  female  with R lower leg anterior wound of >1 months duration.    Referred by:  [facility]      Assessment/Plan     69 y.o. female with adrenal insufficiency and diabetes mellitus.    -R lower leg chronic ulcer.  Full thickness  Smaller+++, more granular, filling in  Slough/granular  Necessitates debridement  for wound healing and to prevent/heal infection  Ulcer needs debridement- see note below  Continue same    -Leg edema/venous insufficiency/secondary lymphedema   Discussed:  Compression  Elevation  Light exercise      -Dm2  A1c 7.1 (2/11/25)      Following discussed with patient / son  Needs :  Serial debridement- prn    Good local wound care  Dressing: see discharge notes  Frequency : three times a week       Continue Home Health    -Edema management  Remove dressing prior to showering  Do not get dressing wet    Edema management:  Elevate leg(s) throughout the day starting in the morning  Compression   Avoid prolonged standing  Advised to wear compression on the left, recommend 15 to 20 mmHg pressure      -Nutrition optimization    -Good Diabetic control    -Good offloading    Patient/ understood and agrees with plan. Questions answered.      Follow up with me in 1 week          Subjective:   4/7 Had TIA, has been in hospital and rehab    HPI:     Fell, acquired laceration, sutured dehisced, became infected, required iv abx, referred here    History/Chart/Medications reviewed    Hospital Course ;   69 y.o.  female with past medical history significant for adrenal insufficiency chronically on prednisone, diabetes mellitus type 2 on metformin and glipizide, hypertension, sleep apnea, history of a stroke, history of diastolic congestive heart failure, history of COPD, cerebral AVM who presented with right leg swelling, redness and tenderness with an open ulcer.  Patient states that her condition started

## 2025-04-14 ENCOUNTER — HOSPITAL ENCOUNTER (EMERGENCY)
Facility: HOSPITAL | Age: 70
Discharge: HOME | End: 2025-04-15
Attending: EMERGENCY MEDICINE
Payer: MEDICARE

## 2025-04-14 ENCOUNTER — APPOINTMENT (OUTPATIENT)
Facility: HOSPITAL | Age: 70
End: 2025-04-14
Payer: MEDICARE

## 2025-04-14 DIAGNOSIS — E83.42 HYPOMAGNESEMIA: ICD-10-CM

## 2025-04-14 DIAGNOSIS — N39.0 URINARY TRACT INFECTION WITHOUT HEMATURIA, SITE UNSPECIFIED: Primary | ICD-10-CM

## 2025-04-14 LAB
ALBUMIN SERPL-MCNC: 3.2 G/DL (ref 3.4–5)
ALBUMIN/GLOB SERPL: 1 (ref 0.8–1.7)
ALP SERPL-CCNC: 91 U/L (ref 45–117)
ALT SERPL-CCNC: 33 U/L (ref 13–56)
ANION GAP SERPL CALC-SCNC: 9 MMOL/L (ref 3–18)
AST SERPL-CCNC: 19 U/L (ref 10–38)
BASOPHILS # BLD: 0.02 K/UL (ref 0–0.1)
BASOPHILS NFR BLD: 0.3 % (ref 0–2)
BILIRUB SERPL-MCNC: 0.4 MG/DL (ref 0.2–1)
BUN SERPL-MCNC: 14 MG/DL (ref 7–18)
BUN/CREAT SERPL: 17 (ref 12–20)
CALCIUM SERPL-MCNC: 8.9 MG/DL (ref 8.5–10.1)
CHLORIDE SERPL-SCNC: 101 MMOL/L (ref 100–111)
CO2 SERPL-SCNC: 28 MMOL/L (ref 21–32)
CREAT SERPL-MCNC: 0.84 MG/DL (ref 0.6–1.3)
DIFFERENTIAL METHOD BLD: ABNORMAL
EOSINOPHIL # BLD: 0.19 K/UL (ref 0–0.4)
EOSINOPHIL NFR BLD: 3 % (ref 0–5)
ERYTHROCYTE [DISTWIDTH] IN BLOOD BY AUTOMATED COUNT: 13.6 % (ref 11.6–14.5)
GLOBULIN SER CALC-MCNC: 3.3 G/DL (ref 2–4)
GLUCOSE SERPL-MCNC: 186 MG/DL (ref 74–99)
HCT VFR BLD AUTO: 34.7 % (ref 35–45)
HGB BLD-MCNC: 11.4 G/DL (ref 12–16)
IMM GRANULOCYTES # BLD AUTO: 0.02 K/UL (ref 0–0.04)
IMM GRANULOCYTES NFR BLD AUTO: 0.3 % (ref 0–0.5)
LYMPHOCYTES # BLD: 2.06 K/UL (ref 0.9–3.3)
LYMPHOCYTES NFR BLD: 32.3 % (ref 21–52)
MAGNESIUM SERPL-MCNC: 0.9 MG/DL (ref 1.6–2.6)
MCH RBC QN AUTO: 30.2 PG (ref 24–34)
MCHC RBC AUTO-ENTMCNC: 32.9 G/DL (ref 31–37)
MCV RBC AUTO: 91.8 FL (ref 78–100)
MONOCYTES # BLD: 0.74 K/UL (ref 0.05–1.2)
MONOCYTES NFR BLD: 11.6 % (ref 3–10)
NEUTS SEG # BLD: 3.35 K/UL (ref 1.8–8)
NEUTS SEG NFR BLD: 52.5 % (ref 40–73)
NRBC # BLD: 0 K/UL (ref 0–0.01)
NRBC BLD-RTO: 0 PER 100 WBC
NT PRO BNP: 71 PG/ML (ref 0–900)
PLATELET # BLD AUTO: 253 K/UL (ref 135–420)
PMV BLD AUTO: 9.7 FL (ref 9.2–11.8)
POTASSIUM SERPL-SCNC: 3.6 MMOL/L (ref 3.5–5.5)
PROT SERPL-MCNC: 6.5 G/DL (ref 6.4–8.2)
RBC # BLD AUTO: 3.78 M/UL (ref 4.2–5.3)
SODIUM SERPL-SCNC: 138 MMOL/L (ref 136–145)
TROPONIN I SERPL HS-MCNC: 5 NG/L (ref 0–54)
WBC # BLD AUTO: 6.4 K/UL (ref 4.6–13.2)

## 2025-04-14 PROCEDURE — 83735 ASSAY OF MAGNESIUM: CPT

## 2025-04-14 PROCEDURE — 70450 CT HEAD/BRAIN W/O DYE: CPT

## 2025-04-14 PROCEDURE — 71045 X-RAY EXAM CHEST 1 VIEW: CPT

## 2025-04-14 PROCEDURE — 80053 COMPREHEN METABOLIC PANEL: CPT

## 2025-04-14 PROCEDURE — 84484 ASSAY OF TROPONIN QUANT: CPT

## 2025-04-14 PROCEDURE — 85025 COMPLETE CBC W/AUTO DIFF WBC: CPT

## 2025-04-14 PROCEDURE — 81001 URINALYSIS AUTO W/SCOPE: CPT

## 2025-04-14 PROCEDURE — 96365 THER/PROPH/DIAG IV INF INIT: CPT

## 2025-04-14 PROCEDURE — 93005 ELECTROCARDIOGRAM TRACING: CPT | Performed by: EMERGENCY MEDICINE

## 2025-04-14 PROCEDURE — 99285 EMERGENCY DEPT VISIT HI MDM: CPT

## 2025-04-14 PROCEDURE — 83880 ASSAY OF NATRIURETIC PEPTIDE: CPT

## 2025-04-14 PROCEDURE — 2500000003 HC RX 250 WO HCPCS: Performed by: EMERGENCY MEDICINE

## 2025-04-14 RX ORDER — MAGNESIUM SULFATE HEPTAHYDRATE 40 MG/ML
2000 INJECTION, SOLUTION INTRAVENOUS ONCE
Status: COMPLETED | OUTPATIENT
Start: 2025-04-14 | End: 2025-04-15

## 2025-04-14 RX ADMIN — MAGNESIUM SULFATE HEPTAHYDRATE 2000 MG: 40 INJECTION, SOLUTION INTRAVENOUS at 23:28

## 2025-04-14 ASSESSMENT — PAIN - FUNCTIONAL ASSESSMENT: PAIN_FUNCTIONAL_ASSESSMENT: NONE - DENIES PAIN

## 2025-04-15 VITALS
OXYGEN SATURATION: 94 % | DIASTOLIC BLOOD PRESSURE: 67 MMHG | HEIGHT: 61 IN | SYSTOLIC BLOOD PRESSURE: 142 MMHG | BODY MASS INDEX: 41.91 KG/M2 | WEIGHT: 222 LBS | HEART RATE: 96 BPM | RESPIRATION RATE: 18 BRPM | TEMPERATURE: 97.9 F

## 2025-04-15 LAB
APPEARANCE UR: CLEAR
BACTERIA URNS QL MICRO: ABNORMAL /HPF
BILIRUB UR QL: NEGATIVE
COLOR UR: YELLOW
EPITH CASTS URNS QL MICRO: ABNORMAL /LPF (ref 0–5)
GLUCOSE UR STRIP.AUTO-MCNC: NEGATIVE MG/DL
HGB UR QL STRIP: NEGATIVE
KETONES UR QL STRIP.AUTO: NEGATIVE MG/DL
LEUKOCYTE ESTERASE UR QL STRIP.AUTO: ABNORMAL
NITRITE UR QL STRIP.AUTO: NEGATIVE
PH UR STRIP: 5.5 (ref 5–8)
PROT UR STRIP-MCNC: NEGATIVE MG/DL
RBC #/AREA URNS HPF: ABNORMAL /HPF (ref 0–5)
SP GR UR REFRACTOMETRY: 1.01 (ref 1–1.03)
UROBILINOGEN UR QL STRIP.AUTO: 0.2 EU/DL (ref 0.2–1)
WBC URNS QL MICRO: ABNORMAL /HPF (ref 0–5)

## 2025-04-15 PROCEDURE — 96366 THER/PROPH/DIAG IV INF ADDON: CPT

## 2025-04-15 PROCEDURE — 96375 TX/PRO/DX INJ NEW DRUG ADDON: CPT

## 2025-04-15 PROCEDURE — 6360000002 HC RX W HCPCS: Performed by: EMERGENCY MEDICINE

## 2025-04-15 PROCEDURE — 2500000003 HC RX 250 WO HCPCS: Performed by: EMERGENCY MEDICINE

## 2025-04-15 RX ORDER — MAGNESIUM OXIDE 400 MG/1
400 TABLET ORAL DAILY
Qty: 30 TABLET | Refills: 1 | Status: SHIPPED | OUTPATIENT
Start: 2025-04-15

## 2025-04-15 RX ORDER — CEPHALEXIN 500 MG/1
500 CAPSULE ORAL 2 TIMES DAILY
Qty: 14 CAPSULE | Refills: 0 | Status: SHIPPED | OUTPATIENT
Start: 2025-04-15 | End: 2025-04-22

## 2025-04-15 RX ADMIN — WATER 1000 MG: 1 INJECTION INTRAMUSCULAR; INTRAVENOUS; SUBCUTANEOUS at 00:32

## 2025-04-15 NOTE — ED TRIAGE NOTES
Pt arrives ambulatory to triage c/o high BP, shakiness and stuttering x 3-4 days. Pt states she just got out of the hospital and was advised to come to ED for the elevated BP and tachycardia.  in triage. Pt states it has been as high as 132.

## 2025-04-15 NOTE — ED PROVIDER NOTES
Patient at [facility] diagnosed with low magnesium given IV mag sulfate as well as Rocephin for antibiotic.  Afterwards she feels well.  We discussed findings CT scan unremarkable.  Symptoms past 3 days highly unlikely this represents stroke requiring admission.  Will continue treatment and outpatient management    Labs Reviewed   CBC WITH AUTO DIFFERENTIAL - Abnormal; Notable for the following components:       Result Value    RBC 3.78 (*)     Hemoglobin 11.4 (*)     Hematocrit 34.7 (*)     Monocytes % 11.6 (*)     All other components within normal limits   COMPREHENSIVE METABOLIC PANEL - Abnormal; Notable for the following components:    Glucose 186 (*)     Albumin 3.2 (*)     All other components within normal limits   MAGNESIUM - Abnormal; Notable for the following components:    Magnesium 0.9 (*)     All other components within normal limits   URINALYSIS - Abnormal; Notable for the following components:    Leukocyte Esterase, Urine MODERATE (*)     All other components within normal limits   URINALYSIS, MICRO - Abnormal; Notable for the following components:    BACTERIA, URINE 1+ (*)     All other components within normal limits   TROPONIN   BRAIN NATRIURETIC PEPTIDE        Recent Results (from the past 12 hours)   EKG 12 Lead    Collection Time: 04/14/25  9:02 PM   Result Value Ref Range    Ventricular Rate 106 BPM    Atrial Rate 106 BPM    P-R Interval 180 ms    QRS Duration 90 ms    Q-T Interval 346 ms    QTc Calculation (Bazett) 459 ms    P Axis 58 degrees    R Axis -9 degrees    T Axis 59 degrees    Diagnosis       Sinus tachycardia  Otherwise normal ECG  When compared with ECG of 21-MAR-2025 13:47,  No significant change was found     CBC with Auto Differential    Collection Time: 04/14/25  9:19 PM   Result Value Ref Range    WBC 6.4 4.6 - 13.2 K/uL    RBC 3.78 (L) 4.20 - 5.30 M/uL    Hemoglobin 11.4 (L) 12.0 - 16.0 g/dL    Hematocrit 34.7 (L) 35.0 - 45.0 %    MCV 91.8 78.0 - 100.0 FL    MCH 30.2 24.0 - 
        CONSULTS:  None    PROCEDURES:  Unless otherwise noted below, none     Procedures      FINAL IMPRESSION      1. Urinary tract infection without hematuria, site unspecified    2. Hypomagnesemia          DISPOSITION/PLAN         PATIENT REFERRED TO:  No follow-up provider specified.    DISCHARGE MEDICATIONS:  New Prescriptions    No medications on file         (Please note that portions of this note were completed with a voice recognition program.  Efforts were made to edit the dictations but occasionally words are mis-transcribed.)    Eve Finnegan DO (electronically signed)  Emergency Attending Physician / Physician Assistant / Nurse Practitioner             Eve Finnegan DO  04/15/25 0038

## 2025-04-15 NOTE — FLOWSHEET NOTE
04/14/25 2330   Vital Signs   BP (!) 149/65   MAP (Calculated) 93   MAP (mmHg) 84     Mg IVPB infusing.

## 2025-04-16 LAB
EKG ATRIAL RATE: 106 BPM
EKG DIAGNOSIS: NORMAL
EKG P AXIS: 58 DEGREES
EKG P-R INTERVAL: 180 MS
EKG Q-T INTERVAL: 346 MS
EKG QRS DURATION: 90 MS
EKG QTC CALCULATION (BAZETT): 459 MS
EKG R AXIS: -9 DEGREES
EKG T AXIS: 59 DEGREES
EKG VENTRICULAR RATE: 106 BPM

## 2025-04-21 ENCOUNTER — HOSPITAL ENCOUNTER (OUTPATIENT)
Facility: HOSPITAL | Age: 70
Discharge: HOME | End: 2025-04-21

## 2025-04-21 NOTE — PROGRESS NOTES
Wound Center  Progress Note / Procedure Note      Chief Complaint:  Lorena Nichole is a 69 y.o.  female  with R lower leg anterior wound of >1 months duration.    Referred by:  [facility]      Assessment/Plan     69 y.o. female with adrenal insufficiency and diabetes mellitus.    -R lower leg chronic ulcer.  Full thickness  Smaller+++, more granular, filling in  Slough/granular  Necessitates debridement  for wound healing and to prevent/heal infection  Ulcer needs debridement- see note below  Continue same    -Leg edema/venous insufficiency/secondary lymphedema   Discussed:  Compression  Elevation  Light exercise      -Dm2  A1c 7.1 (2/11/25)      Following discussed with patient / son  Needs :  Serial debridement- prn    Good local wound care  Dressing: see discharge notes  Frequency : three times a week       Continue Home Health    -Edema management  Remove dressing prior to showering  Do not get dressing wet    Edema management:  Elevate leg(s) throughout the day starting in the morning  Compression   Avoid prolonged standing  Advised to wear compression on the left, recommend 15 to 20 mmHg pressure      -Nutrition optimization    -Good Diabetic control    -Good offloading    Patient/ understood and agrees with plan. Questions answered.      Follow up with me in 1 week          Subjective:   4/7 Had TIA, has been in hospital and rehab    HPI:     Fell, acquired laceration, sutured dehisced, became infected, required iv abx, referred here    History/Chart/Medications reviewed    Hospital Course ;   69 y.o.  female with past medical history significant for adrenal insufficiency chronically on prednisone, diabetes mellitus type 2 on metformin and glipizide, hypertension, sleep apnea, history of a stroke, history of diastolic congestive heart failure, history of COPD, cerebral AVM who presented with right leg swelling, redness and tenderness with an open ulcer.  Patient states that her condition started

## 2025-04-28 ENCOUNTER — HOSPITAL ENCOUNTER (OUTPATIENT)
Facility: HOSPITAL | Age: 70
Discharge: HOME | End: 2025-04-28
Payer: MEDICARE

## 2025-04-28 VITALS
TEMPERATURE: 97.7 F | HEART RATE: 90 BPM | DIASTOLIC BLOOD PRESSURE: 60 MMHG | OXYGEN SATURATION: 98 % | SYSTOLIC BLOOD PRESSURE: 144 MMHG | RESPIRATION RATE: 18 BRPM

## 2025-04-28 DIAGNOSIS — L97.915 ULCER OF RIGHT LOWER EXTREMITY WITH MUSCLE INVOLVEMENT WITHOUT EVIDENCE OF NECROSIS (HCC): ICD-10-CM

## 2025-04-28 DIAGNOSIS — R60.0 LOWER EXTREMITY EDEMA: ICD-10-CM

## 2025-04-28 DIAGNOSIS — I89.0 LYMPHEDEMA: Primary | ICD-10-CM

## 2025-04-28 DIAGNOSIS — I87.2 PERIPHERAL VENOUS INSUFFICIENCY: ICD-10-CM

## 2025-04-28 PROCEDURE — 6370000000 HC RX 637 (ALT 250 FOR IP): Performed by: FAMILY MEDICINE

## 2025-04-28 PROCEDURE — 11042 DBRDMT SUBQ TIS 1ST 20SQCM/<: CPT

## 2025-04-28 RX ORDER — SILVER SULFADIAZINE 10 MG/G
CREAM TOPICAL PRN
OUTPATIENT
Start: 2025-04-28

## 2025-04-28 RX ORDER — GENTAMICIN SULFATE 1 MG/G
OINTMENT TOPICAL PRN
OUTPATIENT
Start: 2025-04-28

## 2025-04-28 RX ORDER — NEOMYCIN/BACITRACIN/POLYMYXINB 3.5-400-5K
OINTMENT (GRAM) TOPICAL PRN
OUTPATIENT
Start: 2025-04-28

## 2025-04-28 RX ORDER — SODIUM CHLOR/HYPOCHLOROUS ACID 0.033 %
SOLUTION, IRRIGATION IRRIGATION PRN
OUTPATIENT
Start: 2025-04-28

## 2025-04-28 RX ORDER — MUPIROCIN 20 MG/G
OINTMENT TOPICAL PRN
OUTPATIENT
Start: 2025-04-28

## 2025-04-28 RX ORDER — LIDOCAINE HYDROCHLORIDE 20 MG/ML
JELLY TOPICAL PRN
OUTPATIENT
Start: 2025-04-28

## 2025-04-28 RX ORDER — LIDOCAINE 40 MG/G
CREAM TOPICAL PRN
OUTPATIENT
Start: 2025-04-28

## 2025-04-28 RX ORDER — BETAMETHASONE DIPROPIONATE 0.5 MG/G
CREAM TOPICAL PRN
OUTPATIENT
Start: 2025-04-28

## 2025-04-28 RX ORDER — LIDOCAINE HYDROCHLORIDE 20 MG/ML
JELLY TOPICAL PRN
Status: DISCONTINUED | OUTPATIENT
Start: 2025-04-28 | End: 2025-04-29 | Stop reason: HOSPADM

## 2025-04-28 RX ORDER — LIDOCAINE HYDROCHLORIDE 40 MG/ML
SOLUTION TOPICAL PRN
OUTPATIENT
Start: 2025-04-28

## 2025-04-28 RX ORDER — BACITRACIN ZINC AND POLYMYXIN B SULFATE 500; 1000 [USP'U]/G; [USP'U]/G
OINTMENT TOPICAL PRN
OUTPATIENT
Start: 2025-04-28

## 2025-04-28 RX ORDER — CLOBETASOL PROPIONATE 0.5 MG/G
OINTMENT TOPICAL PRN
OUTPATIENT
Start: 2025-04-28

## 2025-04-28 RX ORDER — TRIAMCINOLONE ACETONIDE 1 MG/G
OINTMENT TOPICAL PRN
OUTPATIENT
Start: 2025-04-28

## 2025-04-28 RX ORDER — GINSENG 100 MG
CAPSULE ORAL PRN
OUTPATIENT
Start: 2025-04-28

## 2025-04-28 RX ORDER — LIDOCAINE 50 MG/G
OINTMENT TOPICAL PRN
OUTPATIENT
Start: 2025-04-28

## 2025-04-28 RX ADMIN — LIDOCAINE HYDROCHLORIDE: 20 JELLY TOPICAL at 10:33

## 2025-04-28 NOTE — DISCHARGE INSTRUCTIONS
Discharge Instructions from  Wound Care Clinic at [facility]   [address]   [address]  [place] [address] [phone] Fax [phone]    NAME:  Lorena Nichole  YOB: 1955  MEDICAL RECORD NUMBER:  [id number]  DATE:  4/28/25    Wound Cleansing:   Do not scrub or use excessive force.  Cleanse wound prior to applying a clean dressing with:  [] Normal Saline [] Keep Wound Dry in Shower    [x] Wound Cleanser   [] Cleanse wound with Mild Soap & Water  [] May Shower at Discharge   [] Other:      Topical Treatments:  Do not apply lotions, creams, or ointments to wound bed unless directed.   [] Apply moisturizing lotion to skin surrounding the wound prior to dressing change.  [] Apply antifungal ointment to skin surrounding the wound prior to dressing change.  [] Apply thin film of moisture barrier ointment to skin immediately around wound.  [x] Other:  Zinc oxide      Dressings:           Wound Location right lower leg   [] Apply Primary Dressing:       [] MediHoney Gel [] Alginate with Silver [] Alginate   [] Collagen [] Collagen with Silver   [] Santyl with Moisten saline gauze     [] Hydrocolloid   [] MediHoney Alginate [] Foam with Silver   [] Foam   [] Hydrofera Blue    [x] Mepilex Border    [] Moisten with Saline [] Hydrogel [] Mepitel     [] Bactroban/Mupirocin [] Polysporin  [] Other:    [] Pack wound loosely with  [] Iodoform   [] Plain Packing  [] Other   [] Cover and Secure with:     [] Gauze [] Maris [] Kerlix   [] Ace Wrap [] Cover Roll Tape [] ABD     [] Other:    Avoid contact of tape with skin.  [] Change dressing: [] Daily    [] Every Other Day [] Three times per week   [] Once a week [] Do Not Change Dressing   [] Other:     Negative Pressure:           Wound Location:   [] Pressure@           mm/Hg  []Continuous []Intermittent   [] Black  [] White Foam [] Other:   []Change dressing: []Three times per week    []Other:     Pressure Relief:  [] When sitting, shift

## 2025-04-28 NOTE — PLAN OF CARE
Compression Garments    Supply Company for Compression Stockings:     Other [employer]      Ordering Center:     [facility]  [address]  [place] [address] [phone]  WOUND CARE Dept: [phone]   FAX NUMBER [phone]    Patient Information:      [address]   [place] [address]   [phone]   : 1955  AGE: 69 y.o.     GENDER: female   TODAYS DATE:  2025    Insurance:      PRIMARY INSURANCE:  Plan: [email or web address] MEDICARE  Coverage: [employer] MEDICARE  Effective Date: 2024  Group Number: [unfilled]  Subscriber Number: [id number] - (Medicare Managed)    Payer/Plan Subscr  Sex Relation Sub. Ins. ID Effective Group Num   1. [employer] MEDICA* ADASM WALL 1955 Female Self [id number]                                   [address] [address]       Patient Information:      Problem List Items Addressed This Visit          Circulatory    Peripheral venous insufficiency    Relevant Medications    lidocaine (XYLOCAINE) 2 % uro-jet    Other Relevant Orders    MD COMMUNICATION 1    MD COMMUNICATION 2    Initiate Outpatient Wound Care Protocol    Initiate Oxygen Therapy Protocol       Musculoskeletal and Integument    Ulcer of right lower extremity with muscle involvement without evidence of necrosis (HCC)    Relevant Medications    lidocaine (XYLOCAINE) 2 % uro-jet    Other Relevant Orders    MD COMMUNICATION 1    MD COMMUNICATION 2    Initiate Outpatient Wound Care Protocol    Initiate Oxygen Therapy Protocol       Other    Lower extremity edema    Relevant Medications    lidocaine (XYLOCAINE) 2 % uro-jet    Other Relevant Orders    MD COMMUNICATION 1    MD COMMUNICATION 2    Initiate Outpatient Wound Care Protocol    Initiate Oxygen Therapy Protocol    Lymphedema - Primary    Relevant Medications    lidocaine (XYLOCAINE) 2 % uro-jet    Other Relevant Orders    MD COMMUNICATION 1    MD COMMUNICATION 2    Initiate Outpatient Wound Care Protocol

## 2025-04-28 NOTE — PROGRESS NOTES
education: None    Highest education level: None   Tobacco Use    Smoking status: Never    Smokeless tobacco: Never   Substance and Sexual Activity    Alcohol use: No    Drug use: No     Social Drivers of Health     Financial Resource Strain: Low Risk  (9/3/2024)    Received from [facility]    Overall Financial Resource Strain (CARDIA)     Difficulty of Paying Living Expenses: Not hard at all   Food Insecurity: No Food Insecurity (3/22/2025)    Hunger Vital Sign     Worried About Running Out of Food in the Last Year: Never true     Ran Out of Food in the Last Year: Never true   Transportation Needs: No Transportation Needs (3/22/2025)    PRAPARE - Transportation     Lack of Transportation (Medical): No     Lack of Transportation (Non-Medical): No   Social Connections: Feeling Socially Integrated (1/24/2025)    Received from [facility]    OASIS : Social Isolation     Frequency of experiencing loneliness or isolation: Never   Intimate Partner Violence: Unknown (1/7/2025)    Received from [facility]    Humiliation, Afraid, Rape, and Kick questionnaire     Physically Abused: No   Housing Stability: Low Risk  (3/22/2025)    Housing Stability Vital Sign     Unable to Pay for Housing in the Last Year: No     Number of Times Moved in the Last Year: 0     Homeless in the Last Year: No        Prior to Admission medications    Medication Sig Start Date End Date Taking? Authorizing Provider   magnesium oxide (MAG-OX) 400 MG tablet Take 1 tablet by mouth daily 4/15/25   Thaddeus Guzman MD   pregabalin (LYRICA) 200 MG capsule Take 1 capsule by mouth 2 times daily for 1 day. Max Daily Amount: 400 mg 3/23/25 4/21/25  Bárbara Connor MD   nortriptyline (PAMELOR) 25 MG capsule Take 3 capsules by mouth nightly for 1 day 3/23/25 4/21/25  Bárbara Connor MD   valsartan (DIOVAN) 40 MG tablet Take 0.5 tablets by mouth daily 3/23/25   Bárbara Connor MD   furosemide (LASIX) 40 MG tablet Take 0.5 tablets by mouth

## 2025-04-28 NOTE — FLOWSHEET NOTE
04/28/25 1007   Wound 02/10/25 Pretibial Right   Date First Assessed/Time First Assessed: 02/10/25 1103   Present on Original Admission: Yes  Primary Wound Type: Traumatic  Secondary Wound Type - Traumatic: Skin Tear  Location: Pretibial  Wound Location Orientation: Right   Wound Image    Wound Etiology Traumatic   Dressing Status New dressing applied   Wound Cleansed Vashe   Dressing/Treatment Zinc paste;Silicone border;Tubular bandage   Offloading for Diabetic Foot Ulcers Offloading not required   Dressing Change Due 05/05/25   Wound Length (cm) 0 cm   Wound Width (cm) 0 cm   Wound Depth (cm) 0 cm   Wound Surface Area (cm^2) 0 cm^2   Change in Wound Size % (l*w) 100   Wound Volume (cm^3) 0 cm^3   Wound Healing % 100   Wound Assessment Epithelialization   Drainage Amount None (dry)   Wound 04/28/25 Pretibial Proximal;Right;Lateral   Date First Assessed: 04/28/25   Location: Pretibial  Wound Location Orientation: Proximal;Right;Lateral   Wound Image    Wound Etiology Other   Dressing Status New dressing applied   Wound Cleansed Vashe   Dressing/Treatment Zinc paste;Silicone border;Tubular bandage   Offloading for Diabetic Foot Ulcers Offloading not required   Dressing Change Due 05/05/25   Wound Length (cm) 0.4 cm   Wound Width (cm) 0.5 cm   Wound Surface Area (cm^2) 0.2 cm^2   Wound Assessment Fluid filled blister   Drainage Amount None (dry)   Sneha-wound Assessment Intact   Margins Attached edges   Wound Thickness Description not for Pressure Injury Partial thickness   Wound Follow Up   Require Follow Up Yes     Double layer tubi  to right lower leg.   Ordering Velcro compression socks for both lower legs.  Right leg measurements; ankle 22cm, calf 37cm, popliteal area 42.5cm  Left leg measurements; ankle 24cm, calf 38cm, popliteal area 44cm.

## 2025-05-12 ENCOUNTER — HOSPITAL ENCOUNTER (OUTPATIENT)
Facility: HOSPITAL | Age: 70
Discharge: HOME | End: 2025-05-12
Payer: MEDICARE

## 2025-05-12 VITALS
RESPIRATION RATE: 18 BRPM | HEART RATE: 71 BPM | DIASTOLIC BLOOD PRESSURE: 61 MMHG | SYSTOLIC BLOOD PRESSURE: 147 MMHG | TEMPERATURE: 97.7 F

## 2025-05-12 DIAGNOSIS — I87.2 PERIPHERAL VENOUS INSUFFICIENCY: ICD-10-CM

## 2025-05-12 DIAGNOSIS — L97.915 ULCER OF RIGHT LOWER EXTREMITY WITH MUSCLE INVOLVEMENT WITHOUT EVIDENCE OF NECROSIS (HCC): ICD-10-CM

## 2025-05-12 DIAGNOSIS — I89.0 LYMPHEDEMA: ICD-10-CM

## 2025-05-12 DIAGNOSIS — R60.0 LOWER EXTREMITY EDEMA: Primary | ICD-10-CM

## 2025-05-12 PROCEDURE — 99213 OFFICE O/P EST LOW 20 MIN: CPT

## 2025-05-12 RX ORDER — GINSENG 100 MG
CAPSULE ORAL PRN
OUTPATIENT
Start: 2025-05-12

## 2025-05-12 RX ORDER — CLOBETASOL PROPIONATE 0.5 MG/G
OINTMENT TOPICAL PRN
OUTPATIENT
Start: 2025-05-12

## 2025-05-12 RX ORDER — LIDOCAINE HYDROCHLORIDE 40 MG/ML
SOLUTION TOPICAL PRN
OUTPATIENT
Start: 2025-05-12

## 2025-05-12 RX ORDER — BETAMETHASONE DIPROPIONATE 0.5 MG/G
CREAM TOPICAL PRN
OUTPATIENT
Start: 2025-05-12

## 2025-05-12 RX ORDER — GENTAMICIN SULFATE 1 MG/G
OINTMENT TOPICAL PRN
OUTPATIENT
Start: 2025-05-12

## 2025-05-12 RX ORDER — LIDOCAINE 40 MG/G
CREAM TOPICAL PRN
OUTPATIENT
Start: 2025-05-12

## 2025-05-12 RX ORDER — LIDOCAINE HYDROCHLORIDE 20 MG/ML
JELLY TOPICAL PRN
OUTPATIENT
Start: 2025-05-12

## 2025-05-12 RX ORDER — TRIAMCINOLONE ACETONIDE 1 MG/G
OINTMENT TOPICAL PRN
OUTPATIENT
Start: 2025-05-12

## 2025-05-12 RX ORDER — MUPIROCIN 20 MG/G
OINTMENT TOPICAL PRN
OUTPATIENT
Start: 2025-05-12

## 2025-05-12 RX ORDER — NEOMYCIN/BACITRACIN/POLYMYXINB 3.5-400-5K
OINTMENT (GRAM) TOPICAL PRN
OUTPATIENT
Start: 2025-05-12

## 2025-05-12 RX ORDER — BACITRACIN ZINC AND POLYMYXIN B SULFATE 500; 1000 [USP'U]/G; [USP'U]/G
OINTMENT TOPICAL PRN
OUTPATIENT
Start: 2025-05-12

## 2025-05-12 RX ORDER — LIDOCAINE 50 MG/G
OINTMENT TOPICAL PRN
OUTPATIENT
Start: 2025-05-12

## 2025-05-12 RX ORDER — SILVER SULFADIAZINE 10 MG/G
CREAM TOPICAL PRN
OUTPATIENT
Start: 2025-05-12

## 2025-05-12 RX ORDER — SODIUM CHLOR/HYPOCHLOROUS ACID 0.033 %
SOLUTION, IRRIGATION IRRIGATION PRN
OUTPATIENT
Start: 2025-05-12

## 2025-05-12 NOTE — PROGRESS NOTES
Wound Center  Progress Note       Chief Complaint:  Lorena Nichole is a 69 y.o.  female  with R lower leg anterior wound of >1 months duration.    Referred by:  [facility]      Assessment/Plan     69 y.o. female with adrenal insufficiency and diabetes mellitus.    -R lower leg chronic ulcer, venous  Full thickness  Remains healed  Blister prox to healed wound,also venous  Almost healed    -Leg edema/venous insufficiency/secondary lymphedema I89.0 - both legs  Discussed:  Compression  Elevation  Light exercise  ORDER velcro wraps x 2 pairs, for both legs    -Dm2  A1c 7.1 (2/11/25)      Following discussed with patient / son  Needs :  Serial debridement- prn    Good local wound care  Dressing: zinc to both areas, leave on all week  Tubi x 2     D/C  Home Health    -Edema management  Remove dressing prior to showering  Do not get dressing wet  ORDER velcro wraps x 2 pairs, for both legs    Edema management:  Elevate leg(s) throughout the day starting in the morning  Compression   Avoid prolonged standing  Advised to wear compression       -Nutrition optimization    -Good Diabetic control    -Good offloading    Patient/ understood and agrees with plan. Questions answered.      Follow up with me in 1 week          Subjective:   5/12 no new issues, did not get wraps    4/28 no new issues    4/7 Had TIA, has been in hospital and rehab    HPI:     Fell, acquired laceration, sutured dehisced, became infected, required iv abx, referred here    History/Chart/Medications reviewed    Hospital Course ;   69 y.o.  female with past medical history significant for adrenal insufficiency chronically on prednisone, diabetes mellitus type 2 on metformin and glipizide, hypertension, sleep apnea, history of a stroke, history of diastolic congestive heart failure, history of COPD, cerebral AVM who presented with right leg swelling, redness and tenderness with an open ulcer.  Patient states that her condition started beginning of

## 2025-05-13 NOTE — PLAN OF CARE
Compression Garments    Supply Company for Compression Stockings:     Other [employer]      Ordering Center:     [facility]  [address]  [facility] [address] [phone]  WOUND CARE Dept: [phone]   FAX NUMBER [phone]    Patient Information:      Adams Campos [address]  [place] [address]   [phone]   : 1955  AGE: 69 y.o.     GENDER: female   TODAYS DATE:  2025    Insurance:      PRIMARY INSURANCE:  Plan: [employer] CHOICE-PPO MEDICARE  Coverage: HU[employer] MEDICARE  Effective Date: 2024  Group Number: [unfilled]  Subscriber Number: [id number] - (Medicare Managed)    Payer/Plan Subscr  Sex Relation Sub. Ins. ID Effective Group Num   1. [employer] MEDICA* ADAMS WALL 1955 Female Self [id number]                                   [address] [address]       Patient Information:      Problem List Items Addressed This Visit          Circulatory    Peripheral venous insufficiency       Musculoskeletal and Integument    Ulcer of right lower extremity with muscle involvement without evidence of necrosis (HCC)       Other    Lower extremity edema - Primary    Lymphedema       Wound 02/10/25 Pretibial Right (Active)   Wound Image   25 1007   Wound Etiology Traumatic 25 1107   Dressing Status New dressing applied 25 1107   Wound Cleansed Wound cleanser 25 1107   Dressing/Treatment Zinc paste 25 1107   Offloading for Diabetic Foot Ulcers Offloading not required 25 1107   Dressing Change Due 25 1107   Wound Length (cm) 0.3 cm 25 1107   Wound Width (cm) 0.2 cm 25 1107   Wound Depth (cm) 0.1 cm 25 1107   Wound Surface Area (cm^2) 0.06 cm^2 25 1107   Change in Wound Size % (l*w) 99.67 25 1107   Wound Volume (cm^3) 0.006 cm^3 25 1107   Wound Healing % 100 25 1107   Post-Procedure Length (cm) 0.9 cm 258   Post-Procedure Width (cm) 0.6 cm 258

## 2025-05-13 NOTE — FLOWSHEET NOTE
05/12/25 1107   Wound 02/10/25 Pretibial Right   Date First Assessed/Time First Assessed: 02/10/25 1103   Present on Original Admission: Yes  Primary Wound Type: Traumatic  Secondary Wound Type - Traumatic: Skin Tear  Location: Pretibial  Wound Location Orientation: Right   Wound Image    Wound Etiology Traumatic   Dressing Status New dressing applied   Wound Cleansed Wound cleanser   Dressing/Treatment Zinc paste  (tubi  x 2)   Offloading for Diabetic Foot Ulcers Offloading not required   Dressing Change Due 05/19/25   Wound Length (cm) 0.3 cm   Wound Width (cm) 0.2 cm   Wound Depth (cm) 0.1 cm   Wound Surface Area (cm^2) 0.06 cm^2   Change in Wound Size % (l*w) 99.67   Wound Volume (cm^3) 0.006 cm^3   Wound Healing % 100   Wound Assessment Pink/red   Drainage Amount Scant (moist but unmeasurable)   Drainage Description Serosanguinous   Odor None   Sneha-wound Assessment Intact   Margins Defined edges   Wound Thickness Description not for Pressure Injury Partial thickness

## 2025-05-19 ENCOUNTER — HOSPITAL ENCOUNTER (OUTPATIENT)
Facility: HOSPITAL | Age: 70
Discharge: HOME | End: 2025-05-19
Payer: MEDICARE

## 2025-05-19 VITALS
SYSTOLIC BLOOD PRESSURE: 122 MMHG | DIASTOLIC BLOOD PRESSURE: 76 MMHG | RESPIRATION RATE: 18 BRPM | OXYGEN SATURATION: 98 % | TEMPERATURE: 97.5 F | HEART RATE: 66 BPM

## 2025-05-19 DIAGNOSIS — R60.0 LOWER EXTREMITY EDEMA: Primary | ICD-10-CM

## 2025-05-19 DIAGNOSIS — L97.915 ULCER OF RIGHT LOWER EXTREMITY WITH MUSCLE INVOLVEMENT WITHOUT EVIDENCE OF NECROSIS (HCC): ICD-10-CM

## 2025-05-19 DIAGNOSIS — I87.2 PERIPHERAL VENOUS INSUFFICIENCY: ICD-10-CM

## 2025-05-19 DIAGNOSIS — I89.0 LYMPHEDEMA: ICD-10-CM

## 2025-05-19 PROCEDURE — 99211 OFF/OP EST MAY X REQ PHY/QHP: CPT

## 2025-05-19 RX ORDER — SILVER SULFADIAZINE 10 MG/G
CREAM TOPICAL PRN
Status: CANCELLED | OUTPATIENT
Start: 2025-05-19

## 2025-05-19 RX ORDER — NEOMYCIN/BACITRACIN/POLYMYXINB 3.5-400-5K
OINTMENT (GRAM) TOPICAL PRN
Status: CANCELLED | OUTPATIENT
Start: 2025-05-19

## 2025-05-19 RX ORDER — SODIUM CHLOR/HYPOCHLOROUS ACID 0.033 %
SOLUTION, IRRIGATION IRRIGATION PRN
Status: CANCELLED | OUTPATIENT
Start: 2025-05-19

## 2025-05-19 RX ORDER — GINSENG 100 MG
CAPSULE ORAL PRN
Status: CANCELLED | OUTPATIENT
Start: 2025-05-19

## 2025-05-19 RX ORDER — CLOBETASOL PROPIONATE 0.5 MG/G
OINTMENT TOPICAL PRN
Status: CANCELLED | OUTPATIENT
Start: 2025-05-19

## 2025-05-19 RX ORDER — LIDOCAINE HYDROCHLORIDE 20 MG/ML
JELLY TOPICAL PRN
Status: CANCELLED | OUTPATIENT
Start: 2025-05-19

## 2025-05-19 RX ORDER — MUPIROCIN 20 MG/G
OINTMENT TOPICAL PRN
Status: CANCELLED | OUTPATIENT
Start: 2025-05-19

## 2025-05-19 RX ORDER — TRIAMCINOLONE ACETONIDE 1 MG/G
OINTMENT TOPICAL PRN
Status: CANCELLED | OUTPATIENT
Start: 2025-05-19

## 2025-05-19 RX ORDER — LIDOCAINE 40 MG/G
CREAM TOPICAL PRN
Status: CANCELLED | OUTPATIENT
Start: 2025-05-19

## 2025-05-19 RX ORDER — LIDOCAINE HYDROCHLORIDE 40 MG/ML
SOLUTION TOPICAL PRN
Status: CANCELLED | OUTPATIENT
Start: 2025-05-19

## 2025-05-19 RX ORDER — GENTAMICIN SULFATE 1 MG/G
OINTMENT TOPICAL PRN
Status: CANCELLED | OUTPATIENT
Start: 2025-05-19

## 2025-05-19 RX ORDER — LIDOCAINE 50 MG/G
OINTMENT TOPICAL PRN
Status: CANCELLED | OUTPATIENT
Start: 2025-05-19

## 2025-05-19 RX ORDER — BACITRACIN ZINC AND POLYMYXIN B SULFATE 500; 1000 [USP'U]/G; [USP'U]/G
OINTMENT TOPICAL PRN
Status: CANCELLED | OUTPATIENT
Start: 2025-05-19

## 2025-05-19 RX ORDER — BETAMETHASONE DIPROPIONATE 0.5 MG/G
CREAM TOPICAL PRN
Status: CANCELLED | OUTPATIENT
Start: 2025-05-19

## 2025-05-19 NOTE — FLOWSHEET NOTE
05/19/25 1043   Wound 04/28/25 Pretibial Proximal;Right;Lateral   Date First Assessed: 04/28/25   Location: Pretibial  Wound Location Orientation: Proximal;Right;Lateral   Wound Image    Wound Etiology Other   Dressing Status New dressing applied   Wound Cleansed Vashe   Dressing/Treatment Moisturizing cream;Tubular bandage   Offloading for Diabetic Foot Ulcers Offloading not required   Dressing Change Due   (healed)   Wound Length (cm) 0.1 cm   Wound Width (cm) 0.1 cm   Wound Depth (cm) 0 cm   Wound Surface Area (cm^2) 0.01 cm^2   Change in Wound Size % (l*w) 95   Wound Volume (cm^3) 0 cm^3   Wound Assessment Eschar moist   Drainage Amount None (dry)   Drainage Description Serosanguinous   Odor None   Sneha-wound Assessment Intact   Margins Attached edges   Wound Thickness Description not for Pressure Injury Partial thickness   Wound 02/10/25 Pretibial Right   Date First Assessed/Time First Assessed: 02/10/25 1103   Present on Original Admission: Yes  Primary Wound Type: Traumatic  Secondary Wound Type - Traumatic: Skin Tear  Location: Pretibial  Wound Location Orientation: Right   Wound Image    Wound Etiology Traumatic   Dressing Status New dressing applied   Wound Cleansed Vashe   Dressing/Treatment   (healed)   Offloading for Diabetic Foot Ulcers Offloading not required   Wound Length (cm) 0 cm   Wound Width (cm) 0 cm   Wound Depth (cm) 0 cm   Wound Surface Area (cm^2) 0 cm^2   Change in Wound Size % (l*w) 100   Wound Volume (cm^3) 0 cm^3   Wound Healing % 100   Wound Assessment Epithelialization   Drainage Amount None (dry)   Wound Follow Up   Require Follow Up No     Patient wound declared healed. Discharged, encouraged to call for any future wound problems

## 2025-05-19 NOTE — PLAN OF CARE
Wound Care Discharge Instructions  Wound Care Clinic at [facility]                                     [address]             [address]               [place] [address]                                         Telephone: [phone]      FAX [phone]    NAME:  Lorean Nichole  YOB: 1955  MEDICAL RECORD NUMBER:  [id number]  DATE:  @ED@    Congratulations!  You have completed your treatment.     Return to your Primary Care Physician for all your health issues.   Resume your ordinary activities as tolerated.   Take your medications as prescribed by your primary care physician.   Check your skin daily for cracks, bruises, sores, or dryness. Use a moisturizer as needed.   Clean and dry your skin, using mild soap and warm water (not hot).   Avoid alcohol and caffeine and do not smoke.  Maintain a nutritious diet.  Avoid pressure on your wound site. Keep your legs elevated above the level of the heart whenever possible.  Continue to use wraps/stockings/compression as prescribed.  Replace compression stockings every four to six months as needed to ensure proper fit.   Wear well-fitting shoes and leg garments.    Discontinue Home Health.    THANK YOU FOR ALLOWING US TO SERVE YOU.  PLEASE CALL IF YOU DEVELOP ANOTHER WOUND. ([phone])     Electronically signed by Luann Arevalo RN on 5/19/2025 at 3:03 PM

## 2025-05-19 NOTE — PROGRESS NOTES
Wound Center  Progress Note       Chief Complaint:  Lorena Nichole is a 69 y.o.  female  with R lower leg anterior wound of >1 months duration.    Referred by:  [facility]      Assessment/Plan     69 y.o. female with adrenal insufficiency and diabetes mellitus.    -R lower leg chronic ulcer, venous  Full thickness  Remains healed  Blister prox to healed wound,also venous  healed    -Leg edema/venous insufficiency/secondary lymphedema I89.0 - both legs  Discussed:  Compression  Elevation  Light exercise  Getting Velcro wraps today.    Prevention discussed  Vit A&D ointment or vaseline ointment or Aquaphor few times daily  Leave open, no bandaid  No baths/soaks for few weeks      Cont to Elevate legs throughout day and wear compression       Patient/ understood and agrees with plan. Questions answered.      Follow up as needed          Subjective:   5/19 no new issues     4/28 no new issues    4/7 Had TIA, has been in hospital and rehab    HPI:     Fell, acquired laceration, sutured dehisced, became infected, required iv abx, referred here    History/Chart/Medications reviewed    Hospital Course ;   69 y.o.  female with past medical history significant for adrenal insufficiency chronically on prednisone, diabetes mellitus type 2 on metformin and glipizide, hypertension, sleep apnea, history of a stroke, history of diastolic congestive heart failure, history of COPD, cerebral AVM who presented with right leg swelling, redness and tenderness with an open ulcer.  Patient states that her condition started beginning of January when she fell due to her mobility problem resulting in right leg open wound that need multiple stitches at that time.  The stitches were removed but the condition got worse in the area developed an open ulcer with draining blood and pus.  Patient states that she has been getting pain that she described as aching-like in the right leg for the last 2 weeks, moderate to severe, aggravated with

## 2025-05-19 NOTE — DISCHARGE INSTRUCTIONS
Wound Care Discharge Instructions  Wound Care Clinic at [facility]                                     [address]             [address]               [place] [address]                                         Telephone: [phone]      FAX [phone]    NAME:  Lorena Nichole  YOB: 1955  MEDICAL RECORD NUMBER:  [id number]  DATE:  @ED@    Congratulations!  You have completed your treatment.     Return to your Primary Care Physician for all your health issues.   Resume your ordinary activities as tolerated.   Take your medications as prescribed by your primary care physician.   Check your skin daily for cracks, bruises, sores, or dryness. Use a moisturizer as needed.   Clean and dry your skin, using mild soap and warm water (not hot).   Avoid alcohol and caffeine and do not smoke.  Maintain a nutritious diet.  Avoid pressure on your wound site. Keep your legs elevated above the level of the heart whenever possible.  Continue to use wraps/stockings/compression as prescribed.  Replace compression stockings every four to six months as needed to ensure proper fit.   Wear well-fitting shoes and leg garments.    THANK YOU FOR ALLOWING US TO SERVE YOU.  PLEASE CALL IF YOU DEVELOP ANOTHER WOUND. ([phone])     Electronically signed by Luann Arevalo RN on 5/19/2025 at 3:03 PM